# Patient Record
Sex: MALE | Race: WHITE | NOT HISPANIC OR LATINO | Employment: FULL TIME | URBAN - METROPOLITAN AREA
[De-identification: names, ages, dates, MRNs, and addresses within clinical notes are randomized per-mention and may not be internally consistent; named-entity substitution may affect disease eponyms.]

---

## 2017-01-03 ENCOUNTER — CLINICAL SUPPORT (OUTPATIENT)
Dept: SPEECH THERAPY | Facility: HOSPITAL | Age: 67
End: 2017-01-03
Attending: INTERNAL MEDICINE
Payer: COMMERCIAL

## 2017-01-03 DIAGNOSIS — G89.29 CHRONIC PAIN OF BOTH SHOULDERS: ICD-10-CM

## 2017-01-03 DIAGNOSIS — M25.511 CHRONIC PAIN OF BOTH SHOULDERS: ICD-10-CM

## 2017-01-03 DIAGNOSIS — R29.3 POOR POSTURE: ICD-10-CM

## 2017-01-03 DIAGNOSIS — M25.512 CHRONIC PAIN OF BOTH SHOULDERS: ICD-10-CM

## 2017-01-03 DIAGNOSIS — M54.2 PAINFUL CERVICAL ROM: ICD-10-CM

## 2017-01-03 PROCEDURE — 97110 THERAPEUTIC EXERCISES: CPT

## 2017-01-03 PROCEDURE — 97140 MANUAL THERAPY 1/> REGIONS: CPT

## 2017-01-03 NOTE — PROGRESS NOTES
"TIME RECORD    Date:  01/03/2017    Start Time:  800  Stop Time:  845    PROCEDURES:    TIMED  Procedure Min.   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  Diagnosis:   1. Painful cervical ROM     2. Chronic pain of both shoulders     3. Poor posture       Patient reports complaint of neck "stiffness, both sides, right slightly more than left."      Objective:     Manual therapy provided with patient in supine with B LE in 90/90 over traction stool x 20 minutes including STM to B cervical paraspinals with elongation focus right. B UT manual stretch with STM/MFR to same, B upper thoracic laminae release. Gentle suboccipital release.  Patient then instructed in and performed all therex as per log with 1:1 by PTA x 20 minutes total time.    Date 1/3/17   Visit 2   MHP NT   MT 20'   UT Str. MT/self   LV Str. 30"x3   Pec Str. 30"x3   Chin Tuck Supine 5"x5   DNF --   Cervical Lori 5"x5 sb/rot   Lats next   Rows next   Horizontal Abd --   Scaption --   Wall Slides 1x10   Initials DH 1/6         Assessment:     Significant muscle tension and myofascial restrictions noted with manual therapy B cervical paraspinals and UT, right greater than left.  Able to tolerate manual therapy to same and complete therex as noted with reports of "less stiff" after treatment.    Patient Education/Response:     Edu for postural awareness, use of towel roll for positioning in sitting to relieve cervical tension. Given written handout for expanded HEP self pec corner stretch and wall slides.  Demonstrated understanding.    Plans and Goals:     Continue PT per POC, progress as able.     Short Term Goals  = Long Term Goals (8 Weeks): 3/1/17  1. Pt will be independent with HEP  2. Pt will improve (B) cervical AROM by at least 10 degrees "   3. Pt will improve (B) shoulder abduction strength to 5/5 on MMT  4. Pt will improve (B) shoulder AROM flexion by at least 10 degrees  5. Pt will improve FOTO shoulder survey to </= 20% impaired  6. Pt will report <3/10 pain in cervical region when performing cervical AROM in all directions  7. Pt will report < 2/10 pain in (B) shoulders when performing functional activities with (B) UE

## 2017-01-10 ENCOUNTER — CLINICAL SUPPORT (OUTPATIENT)
Dept: SPEECH THERAPY | Facility: HOSPITAL | Age: 67
End: 2017-01-10
Attending: INTERNAL MEDICINE
Payer: COMMERCIAL

## 2017-01-10 DIAGNOSIS — G89.29 CHRONIC PAIN OF BOTH SHOULDERS: ICD-10-CM

## 2017-01-10 DIAGNOSIS — M54.2 PAINFUL CERVICAL ROM: ICD-10-CM

## 2017-01-10 DIAGNOSIS — M25.511 CHRONIC PAIN OF BOTH SHOULDERS: ICD-10-CM

## 2017-01-10 DIAGNOSIS — R29.3 POOR POSTURE: ICD-10-CM

## 2017-01-10 DIAGNOSIS — M25.512 CHRONIC PAIN OF BOTH SHOULDERS: ICD-10-CM

## 2017-01-10 PROCEDURE — 97110 THERAPEUTIC EXERCISES: CPT

## 2017-01-10 PROCEDURE — 97140 MANUAL THERAPY 1/> REGIONS: CPT

## 2017-01-10 NOTE — PROGRESS NOTES
"TIME RECORD    Date:  01/10/2017    Start Time:  800  Stop Time:  840    PROCEDURES:    TIMED  Procedure Min.   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  Diagnosis:   1. Painful cervical ROM     2. Chronic pain of both shoulders     3. Poor posture       Patient with reports of continued stiffness, states he has pain when performing self stretching especially B UT stretch.    Objective:     Manual therapy provided with patient in hooklying x 20 minutes including STM to B cervical paraspinals with elongation focus right. B UT and levator manual stretch with STM/MFR to same, B upper thoracic laminae release. Gentle suboccipital release.  Patient then performed all therex and self stretching as per log with 1:1 by PTA x 20 minutes total time.    Date 1/10/17 1/3/17   Visit 3  2   MHP NT NT   MT 20' 20'   UT Str. MT/self MT/self   LV Str. MT/self 30"x3   Pec Str. 30"x3 30"x3   Chin Tuck Supine 5"x10 Supine 5"x5   DNF next --   Cervical Lori 5"x5  sb/rot 5"x5 sb/rot   Lats GTB 2x10 next   Rows GTB  2x10 next   Horizontal Abd -- --   Scaption -- --   Wall Slides 2x10 1x10   Initials DH 2/6 DH 1/6       Assessment:     Patient able to tolerate manual therapy with reports of "less stiff" after treatment.  Increased muscle tension noted right compared to left.  Able to increase activity slightly with added lats/rows and demonstrate self stretching in pain free range after review.  Voiced no complaints at end of session.  "This is the best part of my week"    Patient Education/Response:     Reviewed self stretching, edu for importance of performing in pain free range, verbalized understanding and performed same after.    Plans and Goals:     Continue PT per POC, progress as able.     Short Term Goals  = Long Term " Goals (8 Weeks): 3/1/17  1. Pt will be independent with HEP  2. Pt will improve (B) cervical AROM by at least 10 degrees   3. Pt will improve (B) shoulder abduction strength to 5/5 on MMT  4. Pt will improve (B) shoulder AROM flexion by at least 10 degrees  5. Pt will improve FOTO shoulder survey to </= 20% impaired  6. Pt will report <3/10 pain in cervical region when performing cervical AROM in all directions  7. Pt will report < 2/10 pain in (B) shoulders when performing functional activities with (B) UE

## 2017-01-12 ENCOUNTER — TELEPHONE (OUTPATIENT)
Dept: PHARMACY | Facility: CLINIC | Age: 67
End: 2017-01-12

## 2017-01-13 ENCOUNTER — TELEPHONE (OUTPATIENT)
Dept: PHARMACY | Facility: CLINIC | Age: 67
End: 2017-01-13

## 2017-01-13 NOTE — TELEPHONE ENCOUNTER
Documentation only:    Copay card info for Dottie    Triston [id number]  PCN [id number]  GRP [id number]  ID [id number]    copay before 250$    After card 5$    MC-1/13/17

## 2017-01-16 ENCOUNTER — TELEPHONE (OUTPATIENT)
Dept: PHARMACY | Facility: CLINIC | Age: 67
End: 2017-01-16

## 2017-01-17 ENCOUNTER — CLINICAL SUPPORT (OUTPATIENT)
Dept: SPEECH THERAPY | Facility: HOSPITAL | Age: 67
End: 2017-01-17
Attending: INTERNAL MEDICINE
Payer: COMMERCIAL

## 2017-01-17 DIAGNOSIS — M25.512 CHRONIC PAIN OF BOTH SHOULDERS: ICD-10-CM

## 2017-01-17 DIAGNOSIS — M25.511 CHRONIC PAIN OF BOTH SHOULDERS: ICD-10-CM

## 2017-01-17 DIAGNOSIS — G89.29 CHRONIC PAIN OF BOTH SHOULDERS: ICD-10-CM

## 2017-01-17 DIAGNOSIS — R29.3 POOR POSTURE: ICD-10-CM

## 2017-01-17 DIAGNOSIS — M54.2 PAINFUL CERVICAL ROM: ICD-10-CM

## 2017-01-17 PROCEDURE — 97140 MANUAL THERAPY 1/> REGIONS: CPT

## 2017-01-17 PROCEDURE — 97110 THERAPEUTIC EXERCISES: CPT

## 2017-01-17 NOTE — PROGRESS NOTES
"TIME RECORD    Date:  01/17/2017    Start Time:  8:05  Stop Time:  9:00    PROCEDURES:    TIMED  Procedure Min.   MT 25   TE 30                 UNTIMED  Procedure Min.             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  Diagnosis:   1. Painful cervical ROM     2. Chronic pain of both shoulders     3. Poor posture       Pain: Pt reports stiffness in B UT and neck today rated 3/10. He also states he is starting to have Right     Objective:     Manual therapy provided with patient in hooklying x 25 minutes including STM to B cervical paraspinals with elongation focus right. B UT and levator manual stretch with STM/MFR to same, B upper thoracic paraspinals, Gentle suboccipital release, manual B shoulder depressions.  Patient then performed all therex and self stretching as per log with 1:1 by PTA x 30 minutes total time.    Date 1/17/17 1/10/17 1/3/17   Visit 4 3  2   MHP - NT NT   MT 25 20' 20'   UT Str. MT/self MT/self MT/self   LV Str. MT/ self MT/self 30"x3   Pec Str. 30"x3 30"x3 30"x3   Chin Tuck Supine 5"x10 Supine 5"x10 Supine 5"x5   DNF  next --   Cervical Lori 5"x5  sb/rot 5"x5  sb/rot 5"x5 sb/rot   Lats GTB 2x10 GTB 2x10 next   Rows GTB 2x10 GTB  2x10 next   Horizontal Abd  -- --   Scaption  -- --   Wall Slides 2x10 2x10 1x10   Initials JA 3/6 DH 2/6 DH 1/6         Assessment:     Pt responded well to PT session. He did not reports any increased pain in cervical spine, no adverse reactions to treatment noted.     Patient Education/Response:   Cont HEP, pt issued updated WHEP ( should retraction/pulldown, Pec st, isometric SB), given BTB    Plans and Goals:     Cont PT as per POC  Short Term Goals  = Long Term Goals (8 Weeks): 3/1/17  1. Pt will be independent with HEP  2. Pt will improve (B) cervical AROM by at least 10 degrees   3. Pt will " improve (B) shoulder abduction strength to 5/5 on MMT  4. Pt will improve (B) shoulder AROM flexion by at least 10 degrees  5. Pt will improve FOTO shoulder survey to </= 20% impaired  6. Pt will report <3/10 pain in cervical region when performing cervical AROM in all directions  7. Pt will report < 2/10 pain in (B) shoulders when performing functional activities with (B) UE

## 2017-01-24 ENCOUNTER — CLINICAL SUPPORT (OUTPATIENT)
Dept: SPEECH THERAPY | Facility: HOSPITAL | Age: 67
End: 2017-01-24
Attending: INTERNAL MEDICINE
Payer: COMMERCIAL

## 2017-01-24 DIAGNOSIS — M54.2 PAINFUL CERVICAL ROM: ICD-10-CM

## 2017-01-24 DIAGNOSIS — R29.3 POOR POSTURE: ICD-10-CM

## 2017-01-24 DIAGNOSIS — M25.512 CHRONIC PAIN OF BOTH SHOULDERS: ICD-10-CM

## 2017-01-24 DIAGNOSIS — G89.29 CHRONIC PAIN OF BOTH SHOULDERS: ICD-10-CM

## 2017-01-24 DIAGNOSIS — M25.511 CHRONIC PAIN OF BOTH SHOULDERS: ICD-10-CM

## 2017-01-24 PROCEDURE — 97110 THERAPEUTIC EXERCISES: CPT

## 2017-01-24 PROCEDURE — 97140 MANUAL THERAPY 1/> REGIONS: CPT

## 2017-01-24 NOTE — PROGRESS NOTES
"TIME RECORD    Date:  01/24/2017    Start Time:  8:06 am   Stop Time:  8:57 am     PROCEDURES:    TIMED  Procedure Min.   MT 24'   TE 27'                 UNTIMED  Procedure Min.             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  Diagnosis:   1. Painful cervical ROM     2. Chronic pain of both shoulders     3. Poor posture       Pain: 0 /10  Pt stated that he was not experiencing any pain, just stiffness in cervical region prior to therapy session today.     Objective:     Manual therapy provided with patient in hooklying x 24 minutes including STM to B cervical paraspinals with elongation focus right. STM/stretch to B UT, STM to B upper thoracic paraspinals, Gentle suboccipital release.  Patient then performed all therex and self stretching as per log with 1:1 by PTx 27 minutes.    Date 1/24/17 1/17/17 1/10/17 1/3/17   Visit 5 4 3  2   MHP - - NT NT   MT 24' 25 20' 20'   UT Str. MT/self 3x30" B MT/self MT/self MT/self   LV Str. 3x30" B MT/ self MT/self 30"x3   Pec Str.  30"x3 30"x3 30"x3   Chin Tuck Supine 5"x15 Supine 5"x10 Supine 5"x10 Supine 5"x5   DNF 3"x10  next --   Cervical Lori 5"x5 sb/rot 5"x5  sb/rot 5"x5  sb/rot 5"x5 sb/rot   Lats GTB 2x15 GTB 2x10 GTB 2x10 next   Rows GTB 2x15 GTB 2x10 GTB  2x10 next   Horizontal Abd RTB 2x10  -- --   Scaption -  -- --   Wall Slides 2x15 2x10 2x10 1x10   Initials CK JA 3/6 DH 2/6 DH 1/6       Assessment:     Pt was able to tolerate additional therex and increased reps noted per log above. Pt tolerated therapy session without any c/o increased pain.     Patient Education/Response:     Continue with HEP. Pt educated on and given handout on additional HEP consisting of DNF and horizontal abduction. Pt given RTB. Pt demo and verbalized understanding.     Plans and Goals:     Cont PT as per " POC  Short Term Goals  = Long Term Goals (8 Weeks): 3/1/17  1. Pt will be independent with HEP  2. Pt will improve (B) cervical AROM by at least 10 degrees   3. Pt will improve (B) shoulder abduction strength to 5/5 on MMT  4. Pt will improve (B) shoulder AROM flexion by at least 10 degrees  5. Pt will improve FOTO shoulder survey to </= 20% impaired  6. Pt will report <3/10 pain in cervical region when performing cervical AROM in all directions  7. Pt will report < 2/10 pain in (B) shoulders when performing functional activities with (B) UE

## 2017-01-31 ENCOUNTER — CLINICAL SUPPORT (OUTPATIENT)
Dept: SPEECH THERAPY | Facility: HOSPITAL | Age: 67
End: 2017-01-31
Attending: INTERNAL MEDICINE
Payer: COMMERCIAL

## 2017-01-31 DIAGNOSIS — M54.2 PAINFUL CERVICAL ROM: ICD-10-CM

## 2017-01-31 DIAGNOSIS — M25.512 CHRONIC PAIN OF BOTH SHOULDERS: ICD-10-CM

## 2017-01-31 DIAGNOSIS — M25.511 CHRONIC PAIN OF BOTH SHOULDERS: ICD-10-CM

## 2017-01-31 DIAGNOSIS — G89.29 CHRONIC PAIN OF BOTH SHOULDERS: ICD-10-CM

## 2017-01-31 DIAGNOSIS — R29.3 POOR POSTURE: ICD-10-CM

## 2017-01-31 PROCEDURE — 97110 THERAPEUTIC EXERCISES: CPT

## 2017-01-31 PROCEDURE — 97140 MANUAL THERAPY 1/> REGIONS: CPT

## 2017-01-31 NOTE — PROGRESS NOTES
"TIME RECORD    Date:  01/31/2017    Start Time:  800  Stop Time:  850    PROCEDURES:    TIMED  Procedure Min.   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  Diagnosis:   1. Painful cervical ROM     2. Chronic pain of both shoulders     3. Poor posture       Patient reports he feels 30-40% improved since start of care.      Objective:     Manual therapy provided with patient in hooklying x 20 minutes including STM to B cervical paraspinals with elongation focus right. STM/stretch to B UT, STM to B upper thoracic paraspinals, Gentle suboccipital release.  Patient then performed all therex and self stretching as per log with 1:1 by PTA x 30 minutes.  Added reps and trial standing scaption today.    Date 1/31/17 1/24/17 1/17/17 1/10/17 1/3/17   Visit 6 5 4 3  2   MHP -- - - NT NT   MT 20' 24' 25 20' 20'   UT Str. MT/self  30"x3 B MT/self 3x30" B MT/self MT/self MT/self   LV Str. MT/self  30"x3 B 3x30" B MT/ self MT/self 30"x3   Pec Str. 30"x3  30"x3 30"x3 30"x3   Chin Tuck Supine   5"x15 Supine 5"x15 Supine 5"x10 Supine 5"x10 Supine 5"x5   DNF 4"x10 3"x10  next --   Cervical Lori 5"x5  sb/rot 5"x5 sb/rot 5"x5  sb/rot 5"x5  sb/rot 5"x5 sb/rot   Lats BTB 2x10  GTB 2x15 GTB 2x10 GTB 2x10 next   Rows BTB 2x10 GTB 2x15 GTB 2x10 GTB  2x10 next   Horizontal Abd RTB 2x12 RTB 2x10  -- --   Scaption Stand 1x10 -  -- --   Wall Slides 2x15 2x15 2x10 2x10 1x10   Initials DH 1/6 CK JA 3/6 DH 2/6 DH 1/6         Assessment:     Continued myofascial restrictions noted right greater than left cervical paraspinals with manual therapy.  Able to tolerate same and increased activity with added reps and scaption.  Reports "good" after session.     Patient Education/Response:     Patient educated to continue HEP.  Verbalized " understanding.    Plans and Goals:     Cont PT as per POC.    Short Term Goals  = Long Term Goals (8 Weeks): 3/1/17  1. Pt will be independent with HEP  2. Pt will improve (B) cervical AROM by at least 10 degrees   3. Pt will improve (B) shoulder abduction strength to 5/5 on MMT  4. Pt will improve (B) shoulder AROM flexion by at least 10 degrees  5. Pt will improve FOTO shoulder survey to </= 20% impaired  6. Pt will report <3/10 pain in cervical region when performing cervical AROM in all directions  7. Pt will report < 2/10 pain in (B) shoulders when performing functional activities with (B) UE

## 2017-02-07 ENCOUNTER — TELEPHONE (OUTPATIENT)
Dept: PHARMACY | Facility: CLINIC | Age: 67
End: 2017-02-07

## 2017-02-07 ENCOUNTER — CLINICAL SUPPORT (OUTPATIENT)
Dept: SPEECH THERAPY | Facility: HOSPITAL | Age: 67
End: 2017-02-07
Attending: INTERNAL MEDICINE
Payer: COMMERCIAL

## 2017-02-07 DIAGNOSIS — M25.511 CHRONIC PAIN OF BOTH SHOULDERS: ICD-10-CM

## 2017-02-07 DIAGNOSIS — M25.512 CHRONIC PAIN OF BOTH SHOULDERS: ICD-10-CM

## 2017-02-07 DIAGNOSIS — R29.3 POOR POSTURE: ICD-10-CM

## 2017-02-07 DIAGNOSIS — M54.2 PAINFUL CERVICAL ROM: ICD-10-CM

## 2017-02-07 DIAGNOSIS — G89.29 CHRONIC PAIN OF BOTH SHOULDERS: ICD-10-CM

## 2017-02-07 PROCEDURE — 97110 THERAPEUTIC EXERCISES: CPT

## 2017-02-07 PROCEDURE — 97140 MANUAL THERAPY 1/> REGIONS: CPT

## 2017-02-07 RX ORDER — ADALIMUMAB 40MG/0.8ML
KIT SUBCUTANEOUS
Qty: 2 VIAL | Refills: 11 | Status: SHIPPED | OUTPATIENT
Start: 2017-02-07 | End: 2018-08-14

## 2017-02-07 NOTE — PROGRESS NOTES
"TIME RECORD    Date:  02/07/2017    Start Time:  805  Stop Time:  855    PROCEDURES:    TIMED  Procedure Min.   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  Diagnosis:   1. Painful cervical ROM     2. Chronic pain of both shoulders     3. Poor posture         Patient reports less "popping" noted in his neck since start of care.  Reports he has not had time for HEP every day.  "I do them sometimes".    Objective:     Manual therapy provided with patient in hooklying x 15 minutes including STM to B cervical paraspinals with elongation focus right. STM/stretch to B UT, STM to B upper thoracic paraspinals, Gentle suboccipital release.  Patient then performed all therex and self stretching as per log with 1:1 by PTA x 35 minutes.  Trial sitting cervical isometrics anf chin tucks as well as into to UBE today.    Date 2/7/17 1/31/17 1/24/17 1/17/17 1/10/17 1/3/17   Visit 7 6 5 4 3  2   MHP -- -- - - NT NT   MT 15' 20' 24' 25 20' 20'   UT Str. MT/self  30"x3 B MT/self  30"x3 B MT/self 3x30" B MT/self MT/self MT/self   LV Str. MT/self  30"x3 B MT/self  30"x3 B 3x30" B MT/ self MT/self 30"x3   Pec Str. 30"x3 30"x3  30"x3 30"x3 30"x3   Chin Tuck Sitting  5"x10 Supine   5"x15 Supine 5"x15 Supine 5"x10 Supine 5"x10 Supine 5"x5   DNF NT 4"x10 3"x10  next --   Cervical Lori 5"x5 sbrot 5"x5  sb/rot 5"x5 sb/rot 5"x5  sb/rot 5"x5  sb/rot 5"x5 sb/rot   Lats BTB 2x12 BTB 2x10  GTB 2x15 GTB 2x10 GTB 2x10 next   Rows BTB 2x12 BTB 2x10 GTB 2x15 GTB 2x10 GTB  2x10 next   Horizontal Abd RTB 2x15 RTB 2x12 RTB 2x10  -- --   Scaption Stand  2x10 Stand 1x10 -  -- --   Wall Slides 2x15 2x15 2x15 2x10 2x10 1x10   Initials DH 2/6 DH 1/6 CK JA 3/6 DH 2/6 DH 1/6         Assessment:     Continued myofascial restrictions noted cervical " "paraspinals with manual therapy.  Right greater than left.Patient able to tolerate progression of therex to sitting and additional of UBE with reports of "ok - good workout"  No complaints of pain or difficulty.    Patient Education/Response:     Edu for performance of HEP as able throughout the day - not waiting until he has time for entire program.      Plans and Goals:     Cont PT as per POC.    Short Term Goals  = Long Term Goals (8 Weeks): 3/1/17  1. Pt will be independent with HEP  2. Pt will improve (B) cervical AROM by at least 10 degrees   3. Pt will improve (B) shoulder abduction strength to 5/5 on MMT  4. Pt will improve (B) shoulder AROM flexion by at least 10 degrees  5. Pt will improve FOTO shoulder survey to </= 20% impaired  6. Pt will report <3/10 pain in cervical region when performing cervical AROM in all directions  7. Pt will report < 2/10 pain in (B) shoulders when performing functional activities with (B) UE        "

## 2017-02-21 ENCOUNTER — CLINICAL SUPPORT (OUTPATIENT)
Dept: REHABILITATION | Facility: HOSPITAL | Age: 67
End: 2017-02-21
Attending: INTERNAL MEDICINE
Payer: COMMERCIAL

## 2017-02-21 ENCOUNTER — TELEPHONE (OUTPATIENT)
Dept: FAMILY MEDICINE | Facility: CLINIC | Age: 67
End: 2017-02-21

## 2017-02-21 DIAGNOSIS — G89.29 CHRONIC PAIN OF BOTH SHOULDERS: ICD-10-CM

## 2017-02-21 DIAGNOSIS — M25.512 CHRONIC PAIN OF BOTH SHOULDERS: ICD-10-CM

## 2017-02-21 DIAGNOSIS — M54.2 PAINFUL CERVICAL ROM: ICD-10-CM

## 2017-02-21 DIAGNOSIS — M25.511 CHRONIC PAIN OF BOTH SHOULDERS: ICD-10-CM

## 2017-02-21 DIAGNOSIS — R29.3 POOR POSTURE: ICD-10-CM

## 2017-02-21 PROCEDURE — 97110 THERAPEUTIC EXERCISES: CPT | Mod: PN

## 2017-02-21 PROCEDURE — 97140 MANUAL THERAPY 1/> REGIONS: CPT | Mod: PN

## 2017-02-21 NOTE — TELEPHONE ENCOUNTER
Call returned to [place] with [facility].  Suzi was advised per Dr. Garcia that ist is ok to send out pt's metoprolol

## 2017-02-21 NOTE — TELEPHONE ENCOUNTER
----- Message from Gail Corley sent at 2/21/2017  2:57 PM CST -----  Contact: Ashley, [facility], [phone]  Called in regards to metoprolol succinate medication being ok to take with another medication. Please advise.

## 2017-02-21 NOTE — PROGRESS NOTES
"TIME RECORD    Date:  02/21/2017    Start Time:  8:00  Stop Time:  8:45    PROCEDURES:    TIMED  Procedure Min.   MT 20   TE 25                 UNTIMED  Procedure Min.             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  Diagnosis:   1. Painful cervical ROM     2. Chronic pain of both shoulders     3. Poor posture       Pain: pt reports he has a long week ahead of him with repairs for his RV. He reports he has to leave session early today. Pt agreeable to an abbreviated session.     Objective:   Manual therapy provided with patient in hooklying x 20 minutes including STM to B cervical paraspinals R>L. STM/stretch to B UT, STM to B upper thoracic paraspinals, Gentle suboccipital release.  Patient then performed all therex and self stretching as per log with 1:1 by PTA x 25 minutes.     Date 2/21/17 2/7/17 1/31/17 1/24/17 1/17/17 1/10/17 1/3/17   Visit 8 7 6 5 4 3  2   MHP - -- -- - - NT NT   MT 20 min  15' 20' 24' 25 20' 20'   UT Str. MT/self  30"x3 B MT/self  30"x3 B MT/self  30"x3 B MT/self 3x30" B MT/self MT/self MT/self   LV Str. MT/self  30"x3 B MT/self  30"x3 B MT/self  30"x3 B 3x30" B MT/ self MT/self 30"x3   Pec Str. 30"x3 30"x3 30"x3  30"x3 30"x3 30"x3   Chin Tuck Sitting  5"x10 Sitting  5"x10 Supine   5"x15 Supine 5"x15 Supine 5"x10 Supine 5"x10 Supine 5"x5   DNF  NT 4"x10 3"x10  next --   Cervical Lori 5"x5 sbrot 5"x5 sbrot 5"x5  sb/rot 5"x5 sb/rot 5"x5  sb/rot 5"x5  sb/rot 5"x5 sb/rot   Lats BTB 2x12 BTB 2x12 BTB 2x10  GTB 2x15 GTB 2x10 GTB 2x10 next   Rows BTB 2x12 BTB 2x12 BTB 2x10 GTB 2x15 GTB 2x10 GTB  2x10 next   Horizontal Abd RTB 2x15 RTB 2x15 RTB 2x12 RTB 2x10  -- --   Scaption Stand  2x10 Stand  2x10 Stand 1x10 -  -- --   Wall Slides 2x15 2x15 2x15 2x15 2x10 2x10 1x10   Initials JA 3/6 DH 2/6 DH 1/6 TOMMY RUANO 3/6 DH 2/6 DH 1/6 "       Assessment:   Pt completed today session with no reports of increased pain in cervical spine today. He was able to perform all therx with no adverse reactions.     Patient Education/Response:     Cont HEP    Plans and Goals:     Short Term Goals  = Long Term Goals (8 Weeks): 3/1/17  1. Pt will be independent with HEP  2. Pt will improve (B) cervical AROM by at least 10 degrees   3. Pt will improve (B) shoulder abduction strength to 5/5 on MMT  4. Pt will improve (B) shoulder AROM flexion by at least 10 degrees  5. Pt will improve FOTO shoulder survey to </= 20% impaired  6. Pt will report <3/10 pain in cervical region when performing cervical AROM in all directions  7. Pt will report < 2/10 pain in (B) shoulders when performing functional activities with (B) UE

## 2017-03-07 ENCOUNTER — TELEPHONE (OUTPATIENT)
Dept: PHARMACY | Facility: CLINIC | Age: 67
End: 2017-03-07

## 2017-03-20 ENCOUNTER — LAB VISIT (OUTPATIENT)
Dept: LAB | Facility: HOSPITAL | Age: 67
End: 2017-03-20
Attending: INTERNAL MEDICINE
Payer: COMMERCIAL

## 2017-03-20 DIAGNOSIS — M06.00 SERONEGATIVE RHEUMATOID ARTHRITIS: ICD-10-CM

## 2017-03-20 LAB
ALBUMIN SERPL BCP-MCNC: 4 G/DL
ALP SERPL-CCNC: 75 U/L
ALT SERPL W/O P-5'-P-CCNC: 31 U/L
ANION GAP SERPL CALC-SCNC: 8 MMOL/L
AST SERPL-CCNC: 39 U/L
BASOPHILS # BLD AUTO: 0.02 K/UL
BASOPHILS NFR BLD: 0.2 %
BILIRUB SERPL-MCNC: 0.8 MG/DL
BUN SERPL-MCNC: 15 MG/DL
CALCIUM SERPL-MCNC: 9.7 MG/DL
CHLORIDE SERPL-SCNC: 107 MMOL/L
CO2 SERPL-SCNC: 27 MMOL/L
CREAT SERPL-MCNC: 1.1 MG/DL
CRP SERPL-MCNC: 3.4 MG/L
DIFFERENTIAL METHOD: ABNORMAL
EOSINOPHIL # BLD AUTO: 0.2 K/UL
EOSINOPHIL NFR BLD: 1.7 %
ERYTHROCYTE [DISTWIDTH] IN BLOOD BY AUTOMATED COUNT: 14.7 %
ERYTHROCYTE [SEDIMENTATION RATE] IN BLOOD BY WESTERGREN METHOD: 12 MM/HR
EST. GFR  (AFRICAN AMERICAN): >60 ML/MIN/1.73 M^2
EST. GFR  (NON AFRICAN AMERICAN): >60 ML/MIN/1.73 M^2
GLUCOSE SERPL-MCNC: 108 MG/DL
HCT VFR BLD AUTO: 39.3 %
HGB BLD-MCNC: 13.5 G/DL
LYMPHOCYTES # BLD AUTO: 3 K/UL
LYMPHOCYTES NFR BLD: 28.3 %
MCH RBC QN AUTO: 31.5 PG
MCHC RBC AUTO-ENTMCNC: 34.4 %
MCV RBC AUTO: 92 FL
MONOCYTES # BLD AUTO: 0.5 K/UL
MONOCYTES NFR BLD: 4.4 %
NEUTROPHILS # BLD AUTO: 7 K/UL
NEUTROPHILS NFR BLD: 65.2 %
PLATELET # BLD AUTO: 207 K/UL
PMV BLD AUTO: 10.2 FL
POTASSIUM SERPL-SCNC: 4.1 MMOL/L
PROT SERPL-MCNC: 7.4 G/DL
RBC # BLD AUTO: 4.29 M/UL
SODIUM SERPL-SCNC: 142 MMOL/L
WBC # BLD AUTO: 10.73 K/UL

## 2017-03-20 PROCEDURE — 36415 COLL VENOUS BLD VENIPUNCTURE: CPT

## 2017-03-20 PROCEDURE — 80053 COMPREHEN METABOLIC PANEL: CPT

## 2017-03-20 PROCEDURE — 86140 C-REACTIVE PROTEIN: CPT

## 2017-03-20 PROCEDURE — 85025 COMPLETE CBC W/AUTO DIFF WBC: CPT

## 2017-03-20 PROCEDURE — 85652 RBC SED RATE AUTOMATED: CPT

## 2017-03-21 ENCOUNTER — TELEPHONE (OUTPATIENT)
Dept: RHEUMATOLOGY | Facility: CLINIC | Age: 67
End: 2017-03-21

## 2017-03-22 ENCOUNTER — OFFICE VISIT (OUTPATIENT)
Dept: RHEUMATOLOGY | Facility: CLINIC | Age: 67
End: 2017-03-22
Payer: COMMERCIAL

## 2017-03-22 VITALS — RESPIRATION RATE: 20 BRPM | HEART RATE: 50 BPM | SYSTOLIC BLOOD PRESSURE: 128 MMHG | DIASTOLIC BLOOD PRESSURE: 76 MMHG

## 2017-03-22 DIAGNOSIS — G56.03 BILATERAL CARPAL TUNNEL SYNDROME: Primary | ICD-10-CM

## 2017-03-22 DIAGNOSIS — M25.551 RIGHT HIP PAIN: ICD-10-CM

## 2017-03-22 DIAGNOSIS — M79.89 BILATERAL HAND SWELLING: ICD-10-CM

## 2017-03-22 PROCEDURE — 99214 OFFICE O/P EST MOD 30 MIN: CPT | Mod: S$GLB,,, | Performed by: INTERNAL MEDICINE

## 2017-03-22 PROCEDURE — 99999 PR PBB SHADOW E&M-EST. PATIENT-LVL III: CPT | Mod: PBBFAC,,, | Performed by: INTERNAL MEDICINE

## 2017-03-22 PROCEDURE — 1160F RVW MEDS BY RX/DR IN RCRD: CPT | Mod: S$GLB,,, | Performed by: INTERNAL MEDICINE

## 2017-03-22 RX ORDER — PREDNISONE 2.5 MG/1
TABLET ORAL
Qty: 90 TABLET | Refills: 0 | Status: SHIPPED | OUTPATIENT
Start: 2017-03-22 | End: 2017-07-06 | Stop reason: SDUPTHER

## 2017-03-22 RX ORDER — OXYCODONE AND ACETAMINOPHEN 10; 325 MG/1; MG/1
1 TABLET ORAL EVERY 4 HOURS PRN
Status: ON HOLD | COMMUNITY
End: 2018-11-29 | Stop reason: HOSPADM

## 2017-03-22 NOTE — MR AVS SNAPSHOT
[facility]  [address] [place] [address] [address]  [place] [address]  Phone: [phone]                  Franko Buchanan MD   3/22/2017 11:30 AM   Office Visit    Description:  Male : 1950   Provider:  Ines Strong MD   Department:  [facility]           Reason for Visit     Disease Management           Diagnoses this Visit        Comments    Bilateral carpal tunnel syndrome    -  Primary     Right hip pain         Bilateral hand swelling                To Do List           Future Appointments        Provider Department Dept Phone    3/23/2017 7:45 AM [facility] XR1 [facility] [phone]    4/3/2017 7:45 AM [facility] MRI1 [facility] [phone]    2017 10:30 AM Esthela Medina MD [facility] [phone]    2017 2:50 PM Nayana Moyer MD [facility] [phone]    2017 1:00 PM EKG, APPT [facility] - [phone]      Goals (5 Years of Data)     None      [facility]     [facility] -  Assistance  Registered nurses in the [facility] provide clinical advisement, health education, appointment booking, and other advisory services.  Call for this free service at [phone].             Medications           Message regarding Medications     Verify the changes and/or additions to your medication regime listed below are the same as discussed with your clinician today.  If any of these changes or additions are incorrect, please notify your healthcare provider.             Verify that the below list of medications is an accurate representation of the medications you are currently taking.  If none reported, the list may be blank. If incorrect, please contact your healthcare provider. Carry this list with you in case of emergency.           Current Medications     aspirin (ECOTRIN) 325 MG EC tablet Take 325 mg by mouth once daily.    calcium carbonate 220 mg capsule Take  250 mg by mouth 2 (two) times daily with meals.    cholecalciferol, vitamin D3, 2,000 unit Cap Take 1 capsule by mouth once daily.    duloxetine (CYMBALTA) 30 MG capsule Take 1 capsule (30 mg total) by mouth every evening.    efinaconazole (JUBLIA) 10 % Ivory aaa on nail qhs    folic acid (FOLVITE) 1 MG tablet Take 1 tablet (1 mg total) by mouth once daily.    HUMIRA 40 mg/0.8 mL injection INJECT 0.8 MLS (40 MG TOTAL) INTO THE SKIN EVERY 14 DAYS    luliconazole (LUZU) 1 % Crea Apply 1 application topically once daily.    methotrexate 2.5 MG Tab TAKE 8 TABLETS (20MG) BY MOUTH EVERY 7 DAYS    metoprolol succinate (TOPROL-XL) 50 MG 24 hr tablet Take 1 tablet (50 mg total) by mouth once daily.    MULTIVITAMIN W-MINERALS/LUTEIN (CENTRUM SILVER ORAL) Take by mouth.    omega-3 fatty acids-vitamin E (SUPER EPA) 1,000-5 mg-unit Cap Take 1 capsule by mouth once daily.     oxycodone-acetaminophen (PERCOCET)  mg per tablet Take 1 tablet by mouth every 4 (four) hours as needed for Pain.    pantoprazole (PROTONIX) 40 MG tablet Take 1 tablet (40 mg total) by mouth once daily.    predniSONE (DELTASONE) 10 MG tablet Take 20 mg by mouth once daily.     celecoxib (CELEBREX) 200 MG capsule Take 200 mg by mouth 2 (two) times daily as needed.     flecainide (TAMBOCOR) 150 MG Tab Take 2 tablets (300 mg total) by mouth daily as needed.    ibuprofen (ADVIL,MOTRIN) 200 MG tablet Take 400 mg by mouth every 6 (six) hours as needed for Pain.           Clinical Reference Information           Your Vitals Were     BP Pulse Resp             128/76 50 20         Blood Pressure          Most Recent Value    BP  128/76      Allergies as of 3/22/2017     No Known Allergies      Immunizations Administered on Date of Encounter - 3/22/2017     None      Orders Placed During Today's Visit      Normal Orders This Visit    Ambulatory consult to Orthopedics     Future Labs/Procedures Expected by Expires    MRI Hand/Wrist W WO Right_Rheumatoid Arthritis   3/22/2017 3/22/2018    X-Ray Hips Bilateral 2 View Inc AP Pelvis  3/22/2017 3/22/2018      Language Assistance Services     ATTENTION: Language assistance services are available, free of charge. Please call [phone].      ATENCIÓN: Si habla luisito, tiene a middleton disposición servicios gratuitos de asistencia lingüística. Llame al [phone].     CHÚ Ý: N?u b?n nói Ti?ng Vi?t, có các d?ch v? h? tr? ngôn ng? mi?n phí dành cho b?n. G?i s? [phone].         [facility] complies with applicable Federal civil rights laws and does not discriminate on the basis of race, color, national origin, age, disability, or sex.

## 2017-03-22 NOTE — PROGRESS NOTES
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    Chief Complaint: Joint Pain     HPI 66 yo male with PMH of ALLAN, atrial fibrillation on 325 mg qday, l3-l4 laminectomy around 2000, 2009( L5-51 dissecotmy), cervical epidural, right inguinal hernia, and kidney stones.  here for evaluation of shoulder and hip stiffness.  He has trouble with rising and sitting from chair.  He also has trouble putting on his jacket. He feels fatigued.  Symptoms have been on going for 2 months.He also reports trouble with weakness in  in both hands and shooting pain  from his neck down to his arms. The pain wakes him up at night. Reports 20 pounds weight loss.   Feels sometimes that legs are weak.  He has appt with neurosurgeon tomorrow.  No headaches, but maybe mild pressure in temples ( he reports reading about [facility]).  Denies any swelling.  Reports that he had some stiffness in morning that has improved in morning since starting prednisone.    Interval history: He is here for follow up of  Seronegative RA.   Reports he is taking prednisone  4.5 mg a day and 5 mg three times a week.  He has soreness in arms.   He has stiffness in hands and in a while shooting pain.  He is on MTX 8 pills a week and doing Humira.  He tried massages with helping of neck pain.   He is doing well on current dose. He has right hip pain.   He is taking cymbalta at bedtime.  He uses his cpap machine all the time.        Past Medical History   Diagnosis Date    Sleep apnea     Atrial fibrillation     Colon polyps     Retinal hole      Right eye    Cataract        Review of Systems   Constitutional: Positive for fatigue. Negative for fever, chills and appetite change.   HENT: Negative for hearing loss, mouth sores, rhinorrhea, sinus pressure and trouble swallowing.    Eyes: Negative for photophobia, pain, discharge, itching and visual disturbance.   Respiratory: Negative for cough, chest tightness, wheezing and stridor.     Cardiovascular: Negative for chest pain and palpitations.   Gastrointestinal: Negative for blood in stool and abdominal distention.   Endocrine: Negative for cold intolerance and heat intolerance.   Genitourinary: Negative for dysuria, hematuria and flank pain.   Musculoskeletal: Positive for myalgias, arthralgias and neck pain. Negative for back pain, joint swelling, gait problem and neck stiffness.   Skin: Negative for color change, pallor and rash.   Neurological: Negative for dizziness, light-headedness, numbness and headaches.   Hematological: Negative for adenopathy. Does not bruise/bleed easily.   Psychiatric/Behavioral: Negative for decreased concentration and agitation. The patient is not nervous/anxious.            Objective:     Physical Exam   Constitutional: He is oriented to person, place, and time. No distress.   HENT:   Head: Normocephalic and atraumatic.   Eyes: Conjunctivae are normal. Pupils are equal, round, and reactive to light.   Neck: No tracheal deviation present. No thyromegaly present.   Cardiovascular: Normal rate, regular rhythm and normal heart sounds.  Exam reveals no gallop and no friction rub.    No murmur heard.  Pulmonary/Chest: No stridor. No respiratory distress. He has no wheezes. He has no rales. He exhibits no tenderness.   Abdominal: Soft. He exhibits no distension. There is no tenderness. There is no rebound.   Neurological: He is alert and oriented to person, place, and time.   Decreased  strength in both hands   Skin: Skin is warm. He is not diaphoretic. ; bulls eye appearing lesion in left groin; erythematous lacy lesion on left foot and small punctate lesions  In left sole  Musculoskeletal:    Shoulders-abduction to 160 degrees bilaterally (worse than last visit)  Trouble rising from chair (resolved)  Motor- 5/5 strength in upper and lower extremity proximally but has decreased  strength (improved since last visit)  Hands- able to make almost full fist now  bilaterally  Wrists- no synovitis         8/2015  Ina-+   subserologies-negative  Esr- 40<51<24  crp- 5<35  Rf,ccp-negative  cpk- 115<92  < 676 (2013)  Aldolase-wnl  spep-wnl  Irma-negative       Thoracic xray (2015): There are anterior flowing osteophytes in the midthoracic spine.    Xrays: (shoulder) -9/2015:  Small high density foci along the margin of the humeral head bilaterally suggestive for calcific tendinitis more prominent on the right. There is   degenerative change of the AC joints also more prominent on the right with hypertrophic spurring.    Mri (cervical):  (2015)Degenerative changes of the cervical spine, most prominent at C5-6 causing mild central spinal canal stenosis.      Mri lumbar:(2015)   Multilevel, moderate to severe lumbar spondylosis is present, as detailed above:  -- severe neural foraminal narrowing on the left at L1-2, on the right at L3-4, bilaterally at L4-5 and on the right at L5-S1.  -- multilevel, moderately severe acquired spinal canal stenosis most pronounced at L4-5.  -- multilevel moderate to severe bilateral facet arthrosis.  -- postoperative changes of left L5 laminectomy and suspected laminotomy at L3.     CT abdomen (2015):visualized portion of the aorta is significant for mild calcification and plaque formation. The right and left kidneys each contain two punctate stones which measure approximately 0.2 cm. The right kidney contains an exophytic 1.4-cm hypodensity extending off the inferior pole which is incompletely characterized but likely represents a cyst. The left kidney contains a 1 cm hypodensity within the superior pole which is incompletely characterized but likely represents a cyst. The     Chest xray (2015): WNL   emg/ncs: (10/20/2015): predominantly sensory axonal polyneuropathy; mild bilateral carpal tunnel syndrome; chronic right c7-c8 radiculopathy may also be present    Bone scan (10/2015):.Degenerative changes in the glenohumeral and SI  joints.    Assessment:      68 yo male with PMH of ALLAN, atrial fibrillation, CTS, cervical radiculopathy, polyneuropathy, renal cysts   here for  For follow up of seronegative RA. Patient initially presented with shoulder and hip stiffness, decreased  strength of both hands,  Hand swelling/stiffness ,anorexia with weight loss.  He is on  MTX 8 pills a week and started on  Humira on April 3, 2016 and tolerating it well. He reports continued pain in hands and shoulder pain.  I told him I do not think it is from his RA given inflammatory markers are normal and on exam I do not detect synovitis.  He reports episodes of right hip pain which I suspect are from bursitis so have asked him to follow up with pain specialist.  Will continue to wean down the prednisone.      1) Seronegative RA:  MRI of hand  - wean prednisone from 4.5 mg a day to 4mg a day  -continue MTX 8 pills a week with folic acid  -continue Humira 40mg sub q every 14 day (started April 3, 2016)  --referral to Dr. Cedillo for CTS  -labs before next appt  Hip xrays      2.fibromyalgia a-controlled.  -continue cymbalta 30mg a day.    rtc in 12   weeks

## 2017-04-03 ENCOUNTER — PATIENT MESSAGE (OUTPATIENT)
Dept: RHEUMATOLOGY | Facility: CLINIC | Age: 67
End: 2017-04-03

## 2017-04-03 ENCOUNTER — HOSPITAL ENCOUNTER (OUTPATIENT)
Dept: RADIOLOGY | Facility: HOSPITAL | Age: 67
Discharge: HOME | End: 2017-04-03
Attending: INTERNAL MEDICINE
Payer: COMMERCIAL

## 2017-04-03 DIAGNOSIS — M79.89 BILATERAL HAND SWELLING: ICD-10-CM

## 2017-04-03 DIAGNOSIS — M25.551 RIGHT HIP PAIN: ICD-10-CM

## 2017-04-03 PROCEDURE — 25500020 PHARM REV CODE 255: Performed by: INTERNAL MEDICINE

## 2017-04-03 PROCEDURE — 73521 X-RAY EXAM HIPS BI 2 VIEWS: CPT | Mod: TC

## 2017-04-03 PROCEDURE — 73223 MRI JOINT UPR EXTR W/O&W/DYE: CPT | Mod: 26,RT,, | Performed by: RADIOLOGY

## 2017-04-03 PROCEDURE — 73521 X-RAY EXAM HIPS BI 2 VIEWS: CPT | Mod: 26,,, | Performed by: RADIOLOGY

## 2017-04-03 PROCEDURE — 73223 MRI JOINT UPR EXTR W/O&W/DYE: CPT | Mod: TC,RT

## 2017-04-03 PROCEDURE — A9585 GADOBUTROL INJECTION: HCPCS | Performed by: INTERNAL MEDICINE

## 2017-04-03 RX ORDER — GADOBUTROL 604.72 MG/ML
10 INJECTION INTRAVENOUS
Status: COMPLETED | OUTPATIENT
Start: 2017-04-03 | End: 2017-04-03

## 2017-04-03 RX ADMIN — GADOBUTROL 10 ML: 604.72 INJECTION INTRAVENOUS at 08:04

## 2017-04-04 ENCOUNTER — PATIENT MESSAGE (OUTPATIENT)
Dept: RHEUMATOLOGY | Facility: CLINIC | Age: 67
End: 2017-04-04

## 2017-04-05 DIAGNOSIS — M79.641 BILATERAL HAND PAIN: Primary | ICD-10-CM

## 2017-04-05 DIAGNOSIS — M79.642 BILATERAL HAND PAIN: Primary | ICD-10-CM

## 2017-04-07 ENCOUNTER — TELEPHONE (OUTPATIENT)
Dept: PHARMACY | Facility: CLINIC | Age: 67
End: 2017-04-07

## 2017-05-02 ENCOUNTER — TELEPHONE (OUTPATIENT)
Dept: PHARMACY | Facility: CLINIC | Age: 67
End: 2017-05-02

## 2017-05-02 ENCOUNTER — OFFICE VISIT (OUTPATIENT)
Dept: SPORTS MEDICINE | Facility: CLINIC | Age: 67
End: 2017-05-02
Payer: COMMERCIAL

## 2017-05-02 VITALS
WEIGHT: 235 LBS | HEART RATE: 50 BPM | HEIGHT: 73 IN | SYSTOLIC BLOOD PRESSURE: 119 MMHG | BODY MASS INDEX: 31.14 KG/M2 | DIASTOLIC BLOOD PRESSURE: 75 MMHG

## 2017-05-02 DIAGNOSIS — M25.851 IMPINGEMENT SYNDROME, HIP, RIGHT: ICD-10-CM

## 2017-05-02 DIAGNOSIS — M70.61 GREATER TROCHANTERIC BURSITIS OF RIGHT HIP: ICD-10-CM

## 2017-05-02 DIAGNOSIS — M25.551 RIGHT HIP PAIN: Primary | ICD-10-CM

## 2017-05-02 PROCEDURE — 99203 OFFICE O/P NEW LOW 30 MIN: CPT | Mod: 25,S$GLB,, | Performed by: ORTHOPAEDIC SURGERY

## 2017-05-02 PROCEDURE — 99999 PR PBB SHADOW E&M-EST. PATIENT-LVL III: CPT | Mod: PBBFAC,,, | Performed by: ORTHOPAEDIC SURGERY

## 2017-05-02 PROCEDURE — 20610 DRAIN/INJ JOINT/BURSA W/O US: CPT | Mod: RT,S$GLB,, | Performed by: ORTHOPAEDIC SURGERY

## 2017-05-02 RX ORDER — TRIAMCINOLONE ACETONIDE 40 MG/ML
40 INJECTION, SUSPENSION INTRA-ARTICULAR; INTRAMUSCULAR
Status: DISCONTINUED | OUTPATIENT
Start: 2017-05-02 | End: 2017-05-02 | Stop reason: HOSPADM

## 2017-05-02 RX ADMIN — TRIAMCINOLONE ACETONIDE 40 MG: 40 INJECTION, SUSPENSION INTRA-ARTICULAR; INTRAMUSCULAR at 12:05

## 2017-05-02 NOTE — PROGRESS NOTES
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 Today the patient rates pain at a 3/10 on visual analog scale.       He report more than 2 months of pain without PARTH.  It wakes him up at night.  Pain with prolonged walking.    Affecting ADLs and exercising    REVIEW OF SYSTEMS:  Constitution: Negative. Negative for chills, fever and night sweats.   HENT: Negative for congestion and headaches.    Eyes: Negative for blurred vision, left vision loss and right vision loss.   Cardiovascular: Negative for chest pain and syncope.   Respiratory: Negative for cough and shortness of breath.    Endocrine: Negative for polydipsia, polyphagia and polyuria.   Hematologic/Lymphatic: Negative for bleeding problem. Does not bruise/bleed easily.   Skin: Negative for dry skin, itching and rash.   Musculoskeletal: Negative for falls. right hip pain and  muscle weakness.   Gastrointestinal: Negative for abdominal pain and bowel incontinence.   Genitourinary: Negative for bladder incontinence and nocturia.   Neurological: Negative for disturbances in coordination, loss of balance and seizures.   Psychiatric/Behavioral: Negative for depression. The patient does not have insomnia.    Allergic/Immunologic: Negative for hives and persistent infections.     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    Sleep apnea        PAST SURGICAL HISTORY:  Past Surgical History:   Procedure Laterality Date    BACK SURGERY      1990's and 2009; last by Naldo    Focal Laser       Right eye    HERNIA REPAIR Right     inguinal    SKIN CANCER EXCISION      SPINE SURGERY      l3-4/ l5-s1  (x 2)    TONSILLECTOMY          FAMILY HISTORY:   Family History   Problem Relation Age of Onset    Colon polyps Father     Cancer Father      leukemia    Glaucoma Mother     Thyroid disease Sister      hashimoto    Cancer Sister      renal    No Known Problems Sister     Heart failure Maternal Grandfather     Cataracts Maternal Grandmother     Alzheimer's disease Maternal Grandmother     No Known Problems Maternal Aunt     No Known Problems Maternal Uncle     No Known Problems Paternal Aunt     No Known Problems Paternal Uncle     No Known Problems Paternal Grandmother     Cancer Paternal Grandfather      colon    Diabetes Neg Hx     Heart disease Neg Hx     Amblyopia Neg Hx     Blindness Neg Hx     Macular degeneration Neg Hx     Retinal detachment Neg Hx     Strabismus Neg Hx     Hypertension Neg Hx     Stroke Neg Hx        SOCIAL HISTORY:   Social History     Social History    Marital status:      Spouse name: Joyce    Number of children: N/A    Years of education: N/A     Occupational History    [employer]     Social History Main Topics    Smoking status: Never Smoker    Smokeless tobacco: Never Used    Alcohol use 3.0 oz/week     6 Standard drinks or equivalent per week      Comment: 3 X WEEK     Drug use: No    Sexual activity: Not on file     Other Topics Concern    Not on file     Social History Narrative       MEDICATIONS:    Current Outpatient Prescriptions:     aspirin (ECOTRIN) 325 MG EC tablet, Take 325 mg by mouth once daily., Disp: , Rfl:     calcium carbonate 220 mg capsule, Take 250 mg by mouth 2 (two) times daily with meals., Disp: , Rfl:     celecoxib (CELEBREX) 200 MG capsule, Take 200 mg by mouth 2 (two) times daily as needed. , Disp: , Rfl:     cholecalciferol, vitamin D3, 2,000 unit Cap, Take 1 capsule by mouth once daily., Disp: , Rfl:     duloxetine (CYMBALTA) 30 MG capsule, Take 1 capsule (30 mg total) by mouth every evening., Disp: 90  "capsule, Rfl: 3    efinaconazole (JUBLIA) 10 % Ivory, aaa on nail qhs, Disp: 8 mL, Rfl: 12    folic acid (FOLVITE) 1 MG tablet, Take 1 tablet (1 mg total) by mouth once daily., Disp: 90 tablet, Rfl: 11    HUMIRA 40 mg/0.8 mL injection, INJECT 0.8 MLS (40 MG TOTAL) INTO THE SKIN EVERY 14 DAYS, Disp: 2 vial, Rfl: 11    ibuprofen (ADVIL,MOTRIN) 200 MG tablet, Take 400 mg by mouth every 6 (six) hours as needed for Pain., Disp: , Rfl:     luliconazole (LUZU) 1 % Crea, Apply 1 application topically once daily., Disp: 60 g, Rfl: 3    methotrexate 2.5 MG Tab, TAKE 8 TABLETS (20MG) BY MOUTH EVERY 7 DAYS, Disp: 96 tablet, Rfl: 1    metoprolol succinate (TOPROL-XL) 50 MG 24 hr tablet, Take 1 tablet (50 mg total) by mouth once daily., Disp: 90 tablet, Rfl: 3    MULTIVITAMIN W-MINERALS/LUTEIN (CENTRUM SILVER ORAL), Take by mouth., Disp: , Rfl:     omega-3 fatty acids-vitamin E (SUPER EPA) 1,000-5 mg-unit Cap, Take 1 capsule by mouth once daily. , Disp: , Rfl:     oxycodone-acetaminophen (PERCOCET)  mg per tablet, Take 1 tablet by mouth every 4 (four) hours as needed for Pain., Disp: , Rfl:     pantoprazole (PROTONIX) 40 MG tablet, Take 1 tablet (40 mg total) by mouth once daily., Disp: 90 tablet, Rfl: 11    predniSONE (DELTASONE) 10 MG tablet, Take 20 mg by mouth once daily. , Disp: , Rfl:     predniSONE (DELTASONE) 2.5 MG tablet, TAKE ONE TABLET BY MOUTH EVERY DAY, Disp: 90 tablet, Rfl: 0    flecainide (TAMBOCOR) 150 MG Tab, Take 2 tablets (300 mg total) by mouth daily as needed., Disp: 30 tablet, Rfl: 6    ALLERGIES:   Review of patient's allergies indicates:  No Known Allergies    VITAL SIGNS:  /75  Pulse (!) 50  Ht 6' 1" (1.854 m)  Wt 106.6 kg (235 lb)  BMI 31 kg/m2     PHYSICAL EXAM / HIP    PHYSICAL EXAMINATION:  General:  The patient is alert and oriented x 3.  Mood is pleasant.  Observation of ears, eyes and nose reveal no gross abnormalities.  HEENT: NCAT, sclera nonicteric  Lungs: " Respirations are equal and unlabored.    RIGHT HIP EXAMINATION     OBSERVATION / INSPECTION  Gait:   Antalgic   Alignment:  Neutral   Scars:   None   Muscle atrophy: None   Effusion:  None   Warmth:  None   Discoloration:   None   Leg lengths:   Equal   Pelvis:    Level     TENDERNESS / CREPITUS (T/C):      T / C  Trochanteric bursa   + / -  Piriformis    - / -  SI joint    - / -  Psoas tendon   - / -  Rectus insertion  - / -  Adductor insertion  - / -  Pubic symphysis  - / -    ROM: (* = pain)    Flexion:    120 degrees  External rotation: 40 degrees  Internal rotation: 30 degrees  Abduction:  45 degrees  Adduction:   20 degrees    SPECIAL TESTS:  Pain w/ forced internal rotation (FADIR): +   Pain w/ forced external rotation (CHAKA): Absent   CHAKA asymmetry:     -  Circumduction test:    -  Stinchfield test:    Negative   Log roll:      Negative   Snapping hip (internal):   Negative   Sit-up pain:     Negative   Resisted sit-up pain:    Negative   Resisted sit-up w/ adductor contraction pain:Negative   Step-down test:    +  Trendelenburg test:    Negative   Bridge test     -    EXTREMITY NEURO-VASCULAR EXAMINATION:   Sensation:  Grossly intact to light touch all dermatomal regions.   Motor Function:  Fully intact motor function at hip, knee, foot and ankle    DTRs;  quadriceps and  achilles 2+.  No clonus and downgoing Babinski.    Vascular status:  DP and PT pulses 2+, brisk capillary refill, symmetric.    Skin: intact, compartments soft.    IMAGING Bilateral (4/3/17): Osseous structures appear intact without evidence of a displaced fracture.  No evidence of dislocation. Moderate degenerative changes in the lower lumbar spine. Degenerative changes about both hips with osteophytosis and subtle loss of joint space, right more than left.   No focal soft tissue swelling or radiopaque foreign body.     ASSESSMENT:   Right hip pain  Right hip impingement syndrome  Right hip trochanteric bursitis     PLAN:  1.  Cortisone injection to right trochanteric bursa  2. Physical therapy at [facility]  3. Follow up 2 months

## 2017-05-02 NOTE — PROCEDURES
Large Joint Aspiration/Injection  Date/Time: 5/2/2017 12:38 PM  Performed by: ROGER VITALE  Authorized by: ROGER VITALE     Consent Done?:  Yes (Verbal)  Indications:  Pain  Procedure site marked: Yes    Timeout: Prior to procedure the correct patient, procedure, and site was verified      Location:  Hip  Site:  R greater trochanteric bursa  Prep: Patient was prepped and draped in usual sterile fashion    Ultrasonic Guidance for needle placement: No  Needle size:  22 G  Approach:  Posterior  Medications:  40 mg triamcinolone acetonide 40 mg/mL  Patient tolerance:  Patient tolerated the procedure well with no immediate complications

## 2017-05-02 NOTE — MR AVS SNAPSHOT
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 AM   Appointment    Description:  Male : 1950   Provider:  Joshua Mcnair MD   Department:  [facility]                To Do List           Future Appointments        Provider Department Dept Phone    2017 2:50 PM Nayana Moyer MD [place] - Dermatology [phone]    2017 1:00 PM EKG, APPT [address] - [phone]    2017 1:40 PM Real Simon MD [address] - Arrhythmia [phone]    2017 7:30 AM APPOINTMENT LAB, [facility] [phone]    2017 12:45 PM Oswald Rangel OD [place] - Optometry [phone]      Goals (5 Years of Data)     None      [facility]     [facility] Care Line -  Assistance  Unless otherwise directed by your provider, please contact [facility], our nurse care line that is available for  assistance.     Registered nurses in the [facility] provide: appointment scheduling, clinical advisement, health education, and other advisory services.  Call: [phone] (toll free)               Medications           Message regarding Medications     Verify the changes and/or additions to your medication regime listed below are the same as discussed with your clinician today.  If any of these changes or additions are incorrect, please notify your healthcare provider.             Verify that the below list of medications is an accurate representation of the medications you are currently taking.  If none reported, the list may be blank. If incorrect, please contact your healthcare provider. Carry this list with you in case of emergency.           Current Medications     aspirin (ECOTRIN) 325 MG EC tablet Take 325 mg by mouth once daily.    calcium carbonate 220 mg capsule Take 250 mg by mouth 2 (two) times daily with meals.     celecoxib (CELEBREX) 200 MG capsule Take 200 mg by mouth 2 (two) times daily as needed.     cholecalciferol, vitamin D3, 2,000 unit Cap Take 1 capsule by mouth once daily.    duloxetine (CYMBALTA) 30 MG capsule Take 1 capsule (30 mg total) by mouth every evening.    efinaconazole (JUBLIA) 10 % Ivory aaa on nail qhs    flecainide (TAMBOCOR) 150 MG Tab Take 2 tablets (300 mg total) by mouth daily as needed.    folic acid (FOLVITE) 1 MG tablet Take 1 tablet (1 mg total) by mouth once daily.    HUMIRA 40 mg/0.8 mL injection INJECT 0.8 MLS (40 MG TOTAL) INTO THE SKIN EVERY 14 DAYS    ibuprofen (ADVIL,MOTRIN) 200 MG tablet Take 400 mg by mouth every 6 (six) hours as needed for Pain.    luliconazole (LUZU) 1 % Crea Apply 1 application topically once daily.    methotrexate 2.5 MG Tab TAKE 8 TABLETS (20MG) BY MOUTH EVERY 7 DAYS    metoprolol succinate (TOPROL-XL) 50 MG 24 hr tablet Take 1 tablet (50 mg total) by mouth once daily.    MULTIVITAMIN W-MINERALS/LUTEIN (CENTRUM SILVER ORAL) Take by mouth.    omega-3 fatty acids-vitamin E (SUPER EPA) 1,000-5 mg-unit Cap Take 1 capsule by mouth once daily.     oxycodone-acetaminophen (PERCOCET)  mg per tablet Take 1 tablet by mouth every 4 (four) hours as needed for Pain.    pantoprazole (PROTONIX) 40 MG tablet Take 1 tablet (40 mg total) by mouth once daily.    predniSONE (DELTASONE) 10 MG tablet Take 20 mg by mouth once daily.     predniSONE (DELTASONE) 2.5 MG tablet TAKE ONE TABLET BY MOUTH EVERY DAY           Clinical Reference Information           Allergies as of 5/2/2017     No Known Allergies      Immunizations Administered on Date of Encounter - 5/2/2017     None      Language Assistance Services     ATTENTION: Language assistance services are available, free of charge. Please call [phone].      ATENCIÓN: Si ronella luisito, tiene a middleton disposición servicios gratuitos de asistencia lingüística. Llame al [phone].     CHÚ Ý: N?u b?n nói Ti?ng Vi?t, có các d?ch  v? h? tr? randolph ng? mi?n phí luish cho b?n. G?i s? [phone].         [facility] complies with applicable Federal civil rights laws and does not discriminate on the basis of race, color, national origin, age, disability, or sex.

## 2017-05-18 ENCOUNTER — OFFICE VISIT (OUTPATIENT)
Dept: DERMATOLOGY | Facility: CLINIC | Age: 67
End: 2017-05-18
Payer: COMMERCIAL

## 2017-05-18 DIAGNOSIS — Z85.828 PERSONAL HISTORY OF SKIN CANCER: ICD-10-CM

## 2017-05-18 DIAGNOSIS — D18.00 ANGIOMA: ICD-10-CM

## 2017-05-18 DIAGNOSIS — L90.5 SCAR: ICD-10-CM

## 2017-05-18 DIAGNOSIS — D22.9 NEVUS: ICD-10-CM

## 2017-05-18 DIAGNOSIS — L82.1 SK (SEBORRHEIC KERATOSIS): ICD-10-CM

## 2017-05-18 DIAGNOSIS — D23.9 DERMATOFIBROMA: ICD-10-CM

## 2017-05-18 DIAGNOSIS — Z79.60 LONG-TERM USE OF IMMUNOSUPPRESSANT MEDICATION: ICD-10-CM

## 2017-05-18 DIAGNOSIS — L81.4 LENTIGO: Primary | ICD-10-CM

## 2017-05-18 PROCEDURE — 99999 PR PBB SHADOW E&M-EST. PATIENT-LVL II: CPT | Mod: PBBFAC,,, | Performed by: DERMATOLOGY

## 2017-05-18 PROCEDURE — 99214 OFFICE O/P EST MOD 30 MIN: CPT | Mod: S$GLB,,, | Performed by: DERMATOLOGY

## 2017-05-18 NOTE — PROGRESS NOTES
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 Pt denies any new, dry, scaly or bleeding lesions.  C/o lesion on right neck. Present for 1-2 days. Not itching. Just raised.  Not tender or painful,. No tx   Wife is concerned about lesion on left cheek       Review of Systems   Constitutional: Negative for fever, chills, weight loss, fatigue, night sweats and malaise.   Gastrointestinal: Negative for indigestion.   Skin: Positive for activity-related sunscreen use. Negative for daily sunscreen use.   Hematologic/Lymphatic: Negative for adenopathy. Does not bruise/bleed easily.        Objective:    Physical Exam   Constitutional: He appears well-developed and well-nourished. No distress.   Neurological: He is alert and oriented to person, place, and time. He is not disoriented.   Psychiatric: He has a normal mood and affect.   Skin:   Areas Examined (abnormalities noted in diagram):   Scalp / Hair Palpated and Inspected  Head / Face Inspection Performed  Neck Inspection Performed  Chest / Axilla Inspection Performed  Abdomen Inspection Performed  Genitals / Buttocks / Groin Inspection Performed  Back Inspection Performed  RUE Inspected  LUE Inspection Performed  RLE Inspected  Nails and Digits Inspection Performed                           Diagram Legend     Erythematous scaling macule/papule c/w actinic keratosis       Vascular papule c/w angioma      Pigmented verrucoid papule/plaque c/w seborrheic keratosis      Yellow umbilicated papule c/w sebaceous hyperplasia      Irregularly shaped tan macule c/w lentigo     1-2 mm smooth white papules consistent with Milia      Movable subcutaneous cyst with punctum c/w epidermal inclusion cyst      Subcutaneous movable cyst c/w pilar cyst      Firm pink to brown papule c/w dermatofibroma      Pedunculated fleshy papule(s) c/w skin  tag(s)      Evenly pigmented macule c/w junctional nevus     Mildly variegated pigmented, slightly irregular-bordered macule c/w mildly atypical nevus      Flesh colored to evenly pigmented papule c/w intradermal nevus       Pink pearly papule/plaque c/w basal cell carcinoma      Erythematous hyperkeratotic cursted plaque c/w SCC      Surgical scar with no sign of skin cancer recurrence      Open and closed comedones      Inflammatory papules and pustules      Verrucoid papule consistent consistent with wart     Erythematous eczematous patches and plaques     Dystrophic onycholytic nail with subungual debris c/w onychomycosis     Umbilicated papule    Erythematous-base heme-crusted tan verrucoid plaque consistent with inflamed seborrheic keratosis     Erythematous Silvery Scaling Plaque c/w Psoriasis     See annotation      Assessment / Plan:        Lentigo  This is a benign hyperpigmented sun induced lesion. Daily sun protection will reduce the number of new lesions. Treatment of these benign lesions are considered cosmetic.  The nature of sun-induced photo-aging and skin cancers is discussed.  Sun avoidance, protective clothing, and the use of 30-SPF sunscreens is advised. Observe closely for skin damage/changes, and call if such occurs.    Angioma  These are benign vascular lesions that are inherited.  Treatment is not necessary.    SK (seborrheic keratosis)  These are benign inherited growths without a malignant potential. Reassurance given to patient. No treatment is necessary.     Nevus  Discussed ABCDE's of nevi.  Monitor for new mole or moles that are becoming bigger, darker, irritated, or developing irregular borders. Brochure provided.    Personal history of skin cancer  Long-term use of immunosuppressant medication    Scar  Total body skin examination performed today including at least 12 points as noted in physical examination. No lesions suspicious for malignancy noted.    Dermatofibroma  Reassurance  given to patient. No treatment is necessary.   Treatment of benign, asymptomatic lesions may be considered cosmetic.        Pt has lesion on left sclera, states present since childhood and has seen ophto in the past.  Pt has appt and will have lesion rechecked at that appt          Return in about 1 year (around 5/18/2018).

## 2017-05-26 ENCOUNTER — TELEPHONE (OUTPATIENT)
Dept: PHARMACY | Facility: CLINIC | Age: 67
End: 2017-05-26

## 2017-06-12 DIAGNOSIS — I48.0 PAF (PAROXYSMAL ATRIAL FIBRILLATION): Primary | ICD-10-CM

## 2017-06-15 NOTE — PROGRESS NOTES
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      HPI 68 yo male with h/o obesity, Sleep Apnea (on CPAP), atrial fibrillation, Seronegative RA, bradycardia.  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   H/o atrial fibrillation, first diagnosed in late 20's. Attributed to caffeine + lack of sleep.   No recurrence until 1/01. Was seeing Dr. Bolton. Ultimately converted spontaneously. His bp was elevated at that time, placed on Atacand, noted cough. Switched to beta blocker.   6/06 noted to have palpitations, work-up indicated PVC's.  Did well until 9/13, developed recurrence. Xarelto and beta blocker initiated >>  converted back to nsr. At f/u, as he was feeling well, and had CHADSVASc of 0, Xarelto was discontinued and aspirin 81 mg initiated.  Had another episode of atrial fibrillation, terminated with Flecainide 2/16.  No recurrence.  Denies syncope, dizziness, dyspnea.  Notes fatigue.  ECG reveals sinus bradycardia at 45 bpm, SC interval 156 msec, narrow QRS.  CHADSVASc is 1.    Review of Systems   Constitution: Positive for malaise/fatigue. Negative for weakness.   Eyes: Negative for blurred vision and double vision.   Cardiovascular: Negative for chest pain, dyspnea on exertion, irregular heartbeat, leg swelling, near-syncope, orthopnea, palpitations, paroxysmal nocturnal dyspnea and syncope.   Respiratory: Negative for cough.    Neurological: Negative for dizziness and light-headedness.   All other systems reviewed and are negative.       Objective:    Physical Exam   Constitutional: He is oriented to person, place, and time. He appears well-developed and well-nourished.   Eyes: Conjunctivae are normal. No scleral icterus.   Neck: No JVD present. No tracheal deviation present.   Cardiovascular: Regular rhythm and normal heart sounds.  Bradycardia present.  PMI is not displaced.    Pulmonary/Chest: Effort normal and breath sounds normal. No  respiratory distress.   Abdominal: Soft. There is no hepatosplenomegaly. There is no tenderness.   Musculoskeletal: He exhibits no edema (lower extremity) or tenderness.   Neurological: He is alert and oriented to person, place, and time.   Skin: Skin is warm and dry. No rash noted.   Psychiatric: He has a normal mood and affect. His behavior is normal.         Assessment:       1. Paroxysmal atrial fibrillation    2. Obstructive sleep apnea syndrome    3. Non morbid obesity, unspecified obesity type    4. Palpitations         Plan:       Doing well.  His CHADSVASc is now 1.  Given paucity of events, would not endorse anticoagulation.  Given the bradycardia, decrease Toprol to 25 mg daily.  If notes no symptomatic benefit, can resume back to 50 mg daily.  F/u in one year.

## 2017-06-16 ENCOUNTER — OFFICE VISIT (OUTPATIENT)
Dept: ELECTROPHYSIOLOGY | Facility: CLINIC | Age: 67
End: 2017-06-16
Payer: COMMERCIAL

## 2017-06-16 VITALS
WEIGHT: 235.88 LBS | BODY MASS INDEX: 31.26 KG/M2 | SYSTOLIC BLOOD PRESSURE: 126 MMHG | DIASTOLIC BLOOD PRESSURE: 78 MMHG | HEART RATE: 45 BPM | HEIGHT: 73 IN

## 2017-06-16 DIAGNOSIS — E66.9 NON MORBID OBESITY, UNSPECIFIED OBESITY TYPE: Chronic | ICD-10-CM

## 2017-06-16 DIAGNOSIS — R00.2 PALPITATIONS: ICD-10-CM

## 2017-06-16 DIAGNOSIS — I48.0 PAF (PAROXYSMAL ATRIAL FIBRILLATION): ICD-10-CM

## 2017-06-16 DIAGNOSIS — I48.0 PAROXYSMAL ATRIAL FIBRILLATION: Primary | ICD-10-CM

## 2017-06-16 DIAGNOSIS — R00.1 BRADYCARDIA: ICD-10-CM

## 2017-06-16 DIAGNOSIS — G47.33 OBSTRUCTIVE SLEEP APNEA SYNDROME: ICD-10-CM

## 2017-06-16 PROCEDURE — 99999 PR PBB SHADOW E&M-EST. PATIENT-LVL III: CPT | Mod: PBBFAC,,, | Performed by: INTERNAL MEDICINE

## 2017-06-16 PROCEDURE — 99214 OFFICE O/P EST MOD 30 MIN: CPT | Mod: S$GLB,,, | Performed by: INTERNAL MEDICINE

## 2017-06-16 PROCEDURE — 93000 ELECTROCARDIOGRAM COMPLETE: CPT | Mod: S$GLB,,, | Performed by: INTERNAL MEDICINE

## 2017-06-16 RX ORDER — METOPROLOL SUCCINATE 25 MG/1
25 TABLET, EXTENDED RELEASE ORAL DAILY
Qty: 90 TABLET | Refills: 3 | Status: SHIPPED | OUTPATIENT
Start: 2017-06-16 | End: 2018-08-06 | Stop reason: SDUPTHER

## 2017-06-16 RX ORDER — FLECAINIDE ACETATE 150 MG/1
300 TABLET ORAL DAILY PRN
Qty: 30 TABLET | Refills: 6 | Status: SHIPPED | OUTPATIENT
Start: 2017-06-16 | End: 2018-04-09 | Stop reason: SDUPTHER

## 2017-06-19 ENCOUNTER — PATIENT MESSAGE (OUTPATIENT)
Dept: RHEUMATOLOGY | Facility: CLINIC | Age: 67
End: 2017-06-19

## 2017-06-19 DIAGNOSIS — M06.9 RHEUMATOID ARTHRITIS INVOLVING MULTIPLE JOINTS: Primary | ICD-10-CM

## 2017-06-19 RX ORDER — METHOTREXATE 2.5 MG/1
TABLET ORAL
Qty: 96 TABLET | Refills: 0 | Status: SHIPPED | OUTPATIENT
Start: 2017-06-19 | End: 2017-10-18 | Stop reason: SDUPTHER

## 2017-06-26 ENCOUNTER — LAB VISIT (OUTPATIENT)
Dept: LAB | Facility: HOSPITAL | Age: 67
End: 2017-06-26
Attending: INTERNAL MEDICINE
Payer: COMMERCIAL

## 2017-06-26 DIAGNOSIS — G56.03 BILATERAL CARPAL TUNNEL SYNDROME: ICD-10-CM

## 2017-06-26 DIAGNOSIS — M06.9 RHEUMATOID ARTHRITIS INVOLVING MULTIPLE JOINTS: ICD-10-CM

## 2017-06-26 LAB
ALBUMIN SERPL BCP-MCNC: 4 G/DL
ALP SERPL-CCNC: 76 U/L
ALT SERPL W/O P-5'-P-CCNC: 34 U/L
ANION GAP SERPL CALC-SCNC: 8 MMOL/L
AST SERPL-CCNC: 43 U/L
BASOPHILS # BLD AUTO: 0.03 K/UL
BASOPHILS NFR BLD: 0.4 %
BILIRUB SERPL-MCNC: 0.6 MG/DL
BUN SERPL-MCNC: 12 MG/DL
CALCIUM SERPL-MCNC: 9.8 MG/DL
CHLORIDE SERPL-SCNC: 104 MMOL/L
CO2 SERPL-SCNC: 28 MMOL/L
CREAT SERPL-MCNC: 1 MG/DL
CRP SERPL-MCNC: 0.9 MG/L
DIFFERENTIAL METHOD: ABNORMAL
EOSINOPHIL # BLD AUTO: 0.2 K/UL
EOSINOPHIL NFR BLD: 2.2 %
ERYTHROCYTE [DISTWIDTH] IN BLOOD BY AUTOMATED COUNT: 15.1 %
ERYTHROCYTE [SEDIMENTATION RATE] IN BLOOD BY WESTERGREN METHOD: 12 MM/HR
EST. GFR  (AFRICAN AMERICAN): >60 ML/MIN/1.73 M^2
EST. GFR  (NON AFRICAN AMERICAN): >60 ML/MIN/1.73 M^2
GLUCOSE SERPL-MCNC: 122 MG/DL
HCT VFR BLD AUTO: 39.5 %
HGB BLD-MCNC: 13.4 G/DL
LYMPHOCYTES # BLD AUTO: 2.2 K/UL
LYMPHOCYTES NFR BLD: 28.1 %
MCH RBC QN AUTO: 31.2 PG
MCHC RBC AUTO-ENTMCNC: 33.9 %
MCV RBC AUTO: 92 FL
MONOCYTES # BLD AUTO: 0.5 K/UL
MONOCYTES NFR BLD: 6.5 %
NEUTROPHILS # BLD AUTO: 4.8 K/UL
NEUTROPHILS NFR BLD: 62.5 %
PLATELET # BLD AUTO: 177 K/UL
PMV BLD AUTO: 10.2 FL
POTASSIUM SERPL-SCNC: 3.8 MMOL/L
PROT SERPL-MCNC: 7.2 G/DL
RBC # BLD AUTO: 4.3 M/UL
SODIUM SERPL-SCNC: 140 MMOL/L
WBC # BLD AUTO: 7.71 K/UL

## 2017-06-26 PROCEDURE — 86140 C-REACTIVE PROTEIN: CPT

## 2017-06-26 PROCEDURE — 36415 COLL VENOUS BLD VENIPUNCTURE: CPT

## 2017-06-26 PROCEDURE — 85652 RBC SED RATE AUTOMATED: CPT

## 2017-06-26 PROCEDURE — 86480 TB TEST CELL IMMUN MEASURE: CPT

## 2017-06-26 PROCEDURE — 80053 COMPREHEN METABOLIC PANEL: CPT

## 2017-06-26 PROCEDURE — 85025 COMPLETE CBC W/AUTO DIFF WBC: CPT

## 2017-06-27 ENCOUNTER — OFFICE VISIT (OUTPATIENT)
Dept: OPTOMETRY | Facility: CLINIC | Age: 67
End: 2017-06-27
Payer: COMMERCIAL

## 2017-06-27 ENCOUNTER — TELEPHONE (OUTPATIENT)
Dept: PHARMACY | Facility: CLINIC | Age: 67
End: 2017-06-27

## 2017-06-27 ENCOUNTER — TELEPHONE (OUTPATIENT)
Dept: RHEUMATOLOGY | Facility: CLINIC | Age: 67
End: 2017-06-27

## 2017-06-27 DIAGNOSIS — H35.363 DRUSEN OF MACULA OF BOTH EYES: ICD-10-CM

## 2017-06-27 DIAGNOSIS — H52.4 PRESBYOPIA OU: ICD-10-CM

## 2017-06-27 DIAGNOSIS — H25.13 NUCLEAR SCLEROSIS, BILATERAL: Primary | ICD-10-CM

## 2017-06-27 PROCEDURE — 99999 PR PBB SHADOW E&M-EST. PATIENT-LVL II: CPT | Mod: PBBFAC,,, | Performed by: OPTOMETRIST

## 2017-06-27 PROCEDURE — 92014 COMPRE OPH EXAM EST PT 1/>: CPT | Mod: S$GLB,,, | Performed by: OPTOMETRIST

## 2017-06-27 PROCEDURE — 92015 DETERMINE REFRACTIVE STATE: CPT | Mod: S$GLB,,, | Performed by: OPTOMETRIST

## 2017-06-27 NOTE — PROGRESS NOTES
HPI     DLS:  12/2/16 Dr Rangel, 12/13/16 Dr Alvarado    Pt here for check of ocular health.  Pt without visual complaints OU. Pt   states he wears readers only.  Pt hasn't noticed any changes on the amsler   grid.      (+)floaters  (-)flashes  (-)headaches  (-)eye pain  (-)itching  (-)tearing  (-)burning    No eyedrops  Hx 6th nerve palsy      Last edited by Oswald Rangel, OD on 6/27/2017 11:27 AM. (History)        ROS     Positive for: Eyes (Hx 6th n palsy)    Negative for: Constitutional, Gastrointestinal, Neurological, Skin,   Genitourinary, Musculoskeletal, HENT, Endocrine, Cardiovascular,   Respiratory, Psychiatric, Allergic/Imm, Heme/Lymph    Last edited by Oswald Rangel, OD on 6/27/2017 11:28 AM. (History)        Assessment /Plan     For exam results, see Encounter Report.    Nuclear sclerosis, bilateral    Drusen of macula of both eyes    Presbyopia OU      1. Cat ou--pt wrote new spex Rx  2. Mac drusen OU--stable.   Pt to cont w amsler grid use/vitamins  3. Sp focal laser for periph hole OD.  Stable  4. 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

## 2017-06-28 ENCOUNTER — OFFICE VISIT (OUTPATIENT)
Dept: RHEUMATOLOGY | Facility: CLINIC | Age: 67
End: 2017-06-28
Payer: COMMERCIAL

## 2017-06-28 VITALS
HEIGHT: 73 IN | RESPIRATION RATE: 18 BRPM | HEART RATE: 68 BPM | DIASTOLIC BLOOD PRESSURE: 76 MMHG | WEIGHT: 232 LBS | SYSTOLIC BLOOD PRESSURE: 124 MMHG | BODY MASS INDEX: 30.75 KG/M2

## 2017-06-28 DIAGNOSIS — Z79.631 ENCOUNTER FOR METHOTREXATE MONITORING: ICD-10-CM

## 2017-06-28 DIAGNOSIS — D84.9 IMMUNOCOMPROMISED: ICD-10-CM

## 2017-06-28 DIAGNOSIS — Z51.81 ENCOUNTER FOR METHOTREXATE MONITORING: ICD-10-CM

## 2017-06-28 DIAGNOSIS — M06.00 SERONEGATIVE RHEUMATOID ARTHRITIS: Primary | ICD-10-CM

## 2017-06-28 LAB
MITOGEN NIL: 9.43 IU/ML
NIL: 0.05 IU/ML
TB ANTIGEN NIL: 0.01 IU/ML
TB ANTIGEN: 0.06 IU/ML
TB GOLD: NEGATIVE

## 2017-06-28 PROCEDURE — 99999 PR PBB SHADOW E&M-EST. PATIENT-LVL III: CPT | Mod: PBBFAC,,, | Performed by: INTERNAL MEDICINE

## 2017-06-28 PROCEDURE — 99214 OFFICE O/P EST MOD 30 MIN: CPT | Mod: S$GLB,,, | Performed by: INTERNAL MEDICINE

## 2017-06-28 NOTE — PROGRESS NOTES
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    Chief Complaint: Joint Pain     HPI 64 yo male with PMH of ALLAN, atrial fibrillation on 325 mg qday, l3-l4 laminectomy around 2000, 2009( L5-51 dissecotmy), cervical epidural, right inguinal hernia, and kidney stones.  here for evaluation of shoulder and hip stiffness.  He has trouble with rising and sitting from chair.  He also has trouble putting on his jacket. He feels fatigued.  Symptoms have been on going for 2 months.He also reports trouble with weakness in  in both hands and shooting pain  from his neck down to his arms. The pain wakes him up at night. Reports 20 pounds weight loss.   Feels sometimes that legs are weak.  He has appt with neurosurgeon tomorrow.  No headaches, but maybe mild pressure in temples ( he reports reading about [facility]).  Denies any swelling.  Reports that he had some stiffness in morning that has improved in morning since starting prednisone.    Interval history: He is here for follow up of  Seronegative RA.   Reports he is taking prednisone  3.5 mg a day  For several weeks.   He has stiffness in hands and in a while shooting pain.  He is on MTX 8 pills a week and doing Humira.  He is doing well on current dose.  Pain level is 1/10.  He feels mild tightness in hands.He is taking cymbalta at bedtime.  He uses his cpap machine all the time.        Past Medical History   Diagnosis Date    Sleep apnea     Atrial fibrillation     Colon polyps     Retinal hole      Right eye    Cataract        Review of Systems   Constitutional: Positive for fatigue. Negative for fever, chills and appetite change.   HENT: Negative for hearing loss, mouth sores, rhinorrhea, sinus pressure and trouble swallowing.    Eyes: Negative for photophobia, pain, discharge, itching and visual disturbance.   Respiratory: Negative for cough, chest tightness, wheezing and stridor.    Cardiovascular: Negative for chest pain and palpitations.    Gastrointestinal: Negative for blood in stool and abdominal distention.   Endocrine: Negative for cold intolerance and heat intolerance.   Genitourinary: Negative for dysuria, hematuria and flank pain.   Musculoskeletal: Positive for myalgias, arthralgias and neck pain. Negative for back pain, joint swelling, gait problem and neck stiffness.   Skin: Negative for color change, pallor and rash.   Neurological: Negative for dizziness, light-headedness, numbness and headaches.   Hematological: Negative for adenopathy. Does not bruise/bleed easily.   Psychiatric/Behavioral: Negative for decreased concentration and agitation. The patient is not nervous/anxious.            Objective:     Physical Exam   Constitutional: He is oriented to person, place, and time. No distress.   HENT:   Head: Normocephalic and atraumatic.   Eyes: Conjunctivae are normal. Pupils are equal, round, and reactive to light.   Neck: No tracheal deviation present. No thyromegaly present.   Cardiovascular: Normal rate, regular rhythm and normal heart sounds.  Exam reveals no gallop and no friction rub.    No murmur heard.  Pulmonary/Chest: No stridor. No respiratory distress. He has no wheezes. He has no rales. He exhibits no tenderness.   Abdominal: Soft. He exhibits no distension. There is no tenderness. There is no rebound.   Neurological: He is alert and oriented to person, place, and time.   Decreased  strength in both hands   Skin: Skin is warm. He is not diaphoretic. ; bulls eye appearing lesion in left groin; erythematous lacy lesion on left foot and small punctate lesions  In left sole  Musculoskeletal:    Shoulders-abduction to 160 degrees bilaterally (worse than last visit)  Trouble rising from chair (resolved)  Motor- 5/5 strength in upper and lower extremity proximally but has decreased  strength (improved since last visit)  Hands- able to make almost full fist now bilaterally  Wrists- no synovitis         8/2015  Ina-+    subserologies-negative  Esr- 40<51<24  crp- 5<35  Rf,ccp-negative  cpk- 115<92  < 676 (2013)  Aldolase-wnl  spep-wnl  Irma-negative       Thoracic xray (2015): There are anterior flowing osteophytes in the midthoracic spine.    Xrays: (shoulder) -9/2015:  Small high density foci along the margin of the humeral head bilaterally suggestive for calcific tendinitis more prominent on the right. There is   degenerative change of the AC joints also more prominent on the right with hypertrophic spurring.    Mri (cervical):  (2015)Degenerative changes of the cervical spine, most prominent at C5-6 causing mild central spinal canal stenosis.      Mri lumbar:(2015)   Multilevel, moderate to severe lumbar spondylosis is present, as detailed above:  -- severe neural foraminal narrowing on the left at L1-2, on the right at L3-4, bilaterally at L4-5 and on the right at L5-S1.  -- multilevel, moderately severe acquired spinal canal stenosis most pronounced at L4-5.  -- multilevel moderate to severe bilateral facet arthrosis.  -- postoperative changes of left L5 laminectomy and suspected laminotomy at L3.     CT abdomen (2015):visualized portion of the aorta is significant for mild calcification and plaque formation. The right and left kidneys each contain two punctate stones which measure approximately 0.2 cm. The right kidney contains an exophytic 1.4-cm hypodensity extending off the inferior pole which is incompletely characterized but likely represents a cyst. The left kidney contains a 1 cm hypodensity within the superior pole which is incompletely characterized but likely represents a cyst. The     Chest xray (2015): WNL   emg/ncs: (10/20/2015): predominantly sensory axonal polyneuropathy; mild bilateral carpal tunnel syndrome; chronic right c7-c8 radiculopathy may also be present    Bone scan (10/2015):.Degenerative changes in the glenohumeral and SI joints.    Assessment:      68 yo male with PMH of ALLAN, atrial fibrillation,  CTS, cervical radiculopathy, polyneuropathy, renal cysts   here for  For follow up of seronegative RA. Patient initially presented with shoulder and hip stiffness, decreased  strength of both hands,  Hand swelling/stiffness ,anorexia with weight loss.  He is on  MTX 8 pills a week and started on  Humira on April 3, 2016 and tolerating it well. He reports continued pain in hands and shoulder pain.  I told him I do not think it is from his RA given inflammatory markers are normal and on exam I do not detect synovitis.  Will continue to wean down the prednisone.      1) Seronegative RA:  - wean prednisone from 3.5 mg a day to 3mg a day  -continue MTX 8 pills a week with folic acid  -continue Humira 40mg sub q every 14 day (started April 3, 2016)  --referral to Dr. Cedillo for CTS if needed in future  Discussed with patient that we can can consider enbrel in the future  -labs before next appt        2.fibromyalgia a-controlled.  -continue cymbalta 30mg a day.    rtc in 12   weeks

## 2017-07-06 RX ORDER — PREDNISONE 2.5 MG/1
TABLET ORAL
Qty: 90 TABLET | Refills: 0 | Status: SHIPPED | OUTPATIENT
Start: 2017-07-06 | End: 2017-10-18 | Stop reason: DRUGHIGH

## 2017-07-27 ENCOUNTER — TELEPHONE (OUTPATIENT)
Dept: PHARMACY | Facility: CLINIC | Age: 67
End: 2017-07-27

## 2017-07-28 ENCOUNTER — TELEPHONE (OUTPATIENT)
Dept: OBSTETRICS AND GYNECOLOGY | Facility: CLINIC | Age: 67
End: 2017-07-28

## 2017-07-28 DIAGNOSIS — R07.81 RIB PAIN ON RIGHT SIDE: Primary | ICD-10-CM

## 2017-07-28 NOTE — TELEPHONE ENCOUNTER
Patient had fall at home. Experiencing pain on right side.     Requesting chest x-ray with views of right ribs.    Please advise.

## 2017-07-31 ENCOUNTER — TELEPHONE (OUTPATIENT)
Dept: FAMILY MEDICINE | Facility: CLINIC | Age: 67
End: 2017-07-31

## 2017-07-31 ENCOUNTER — PATIENT MESSAGE (OUTPATIENT)
Dept: ADMINISTRATIVE | Facility: OTHER | Age: 67
End: 2017-07-31

## 2017-07-31 NOTE — TELEPHONE ENCOUNTER
I still see that these x-rays are listed as ordered and not completed.  Did anybody get back to him to get these x-rays done?

## 2017-08-02 ENCOUNTER — HOSPITAL ENCOUNTER (OUTPATIENT)
Dept: RADIOLOGY | Facility: HOSPITAL | Age: 67
Discharge: HOME | End: 2017-08-02
Attending: FAMILY MEDICINE
Payer: COMMERCIAL

## 2017-08-02 DIAGNOSIS — R07.81 RIB PAIN ON RIGHT SIDE: ICD-10-CM

## 2017-08-02 PROCEDURE — 71100 X-RAY EXAM RIBS UNI 2 VIEWS: CPT | Mod: 26,,, | Performed by: RADIOLOGY

## 2017-08-02 PROCEDURE — 71100 X-RAY EXAM RIBS UNI 2 VIEWS: CPT | Mod: TC

## 2017-08-02 PROCEDURE — 71020 XR CHEST PA AND LATERAL: CPT | Mod: 26,,, | Performed by: RADIOLOGY

## 2017-08-02 PROCEDURE — 71020 XR CHEST PA AND LATERAL: CPT | Mod: TC

## 2017-08-03 ENCOUNTER — TELEPHONE (OUTPATIENT)
Dept: FAMILY MEDICINE | Facility: CLINIC | Age: 67
End: 2017-08-03

## 2017-08-22 ENCOUNTER — TELEPHONE (OUTPATIENT)
Dept: PHARMACY | Facility: CLINIC | Age: 67
End: 2017-08-22

## 2017-08-30 ENCOUNTER — DOCUMENTATION ONLY (OUTPATIENT)
Dept: REHABILITATION | Facility: HOSPITAL | Age: 67
End: 2017-08-30

## 2017-08-30 DIAGNOSIS — M25.512 CHRONIC PAIN OF BOTH SHOULDERS: ICD-10-CM

## 2017-08-30 DIAGNOSIS — R29.3 POOR POSTURE: ICD-10-CM

## 2017-08-30 DIAGNOSIS — M54.2 PAINFUL CERVICAL ROM: ICD-10-CM

## 2017-08-30 DIAGNOSIS — G89.29 CHRONIC PAIN OF BOTH SHOULDERS: ICD-10-CM

## 2017-08-30 DIAGNOSIS — M25.511 CHRONIC PAIN OF BOTH SHOULDERS: ICD-10-CM

## 2017-08-30 NOTE — PROGRESS NOTES
REHAB SERVICES OUTPATIENT DISCHARGE SUMMARY  Physical Therapy      Name:  Franko Buchanan MD  Date:  8/30/17  Date of Evaluation:  12/30/16  Physician:  Ines Strong MD  Total # Of Visits:  8  Diagnosis:    1. Painful cervical ROM     2. Chronic pain of both shoulders     3. Poor posture         Physical/Functional Status:  At time of discharge, patient status unknown secondary to pt not attending PT since 2/21/17. Pt cancelled last scheduled appointment.     The patient is to be discharged from our Therapy service for the following reason(s):  Patient has not attended therapy since 2/21/17    Degree of Goal Achievement:  Patient has partially met goals    Patient Education:  HEP    Discharge Plan:  Other:  Pt is discharged from PT at this time.

## 2017-09-20 ENCOUNTER — TELEPHONE (OUTPATIENT)
Dept: PHARMACY | Facility: CLINIC | Age: 67
End: 2017-09-20

## 2017-10-02 DIAGNOSIS — M06.4 UNDIFFERENTIATED INFLAMMATORY POLYARTHRITIS: ICD-10-CM

## 2017-10-02 RX ORDER — PANTOPRAZOLE SODIUM 40 MG/1
TABLET, DELAYED RELEASE ORAL
Qty: 90 TABLET | Refills: 10 | Status: SHIPPED | OUTPATIENT
Start: 2017-10-02 | End: 2018-08-14

## 2017-10-02 RX ORDER — DULOXETIN HYDROCHLORIDE 30 MG/1
CAPSULE, DELAYED RELEASE ORAL
Qty: 90 CAPSULE | Refills: 2 | Status: SHIPPED | OUTPATIENT
Start: 2017-10-02 | End: 2018-06-22

## 2017-10-17 ENCOUNTER — OFFICE VISIT (OUTPATIENT)
Dept: FAMILY MEDICINE | Facility: CLINIC | Age: 67
End: 2017-10-17
Payer: COMMERCIAL

## 2017-10-17 ENCOUNTER — LAB VISIT (OUTPATIENT)
Dept: LAB | Facility: HOSPITAL | Age: 67
End: 2017-10-17
Attending: FAMILY MEDICINE
Payer: COMMERCIAL

## 2017-10-17 VITALS
HEIGHT: 73 IN | HEART RATE: 56 BPM | WEIGHT: 248.25 LBS | BODY MASS INDEX: 32.9 KG/M2 | TEMPERATURE: 99 F | DIASTOLIC BLOOD PRESSURE: 78 MMHG | OXYGEN SATURATION: 96 % | SYSTOLIC BLOOD PRESSURE: 134 MMHG

## 2017-10-17 DIAGNOSIS — N28.1 RENAL CYST: ICD-10-CM

## 2017-10-17 DIAGNOSIS — I48.0 PAROXYSMAL ATRIAL FIBRILLATION: ICD-10-CM

## 2017-10-17 DIAGNOSIS — G47.33 OBSTRUCTIVE SLEEP APNEA SYNDROME: ICD-10-CM

## 2017-10-17 DIAGNOSIS — Z12.11 COLON CANCER SCREENING: ICD-10-CM

## 2017-10-17 DIAGNOSIS — M50.30 DDD (DEGENERATIVE DISC DISEASE), CERVICAL: ICD-10-CM

## 2017-10-17 DIAGNOSIS — Z00.00 ROUTINE GENERAL MEDICAL EXAMINATION AT A HEALTH CARE FACILITY: Primary | ICD-10-CM

## 2017-10-17 DIAGNOSIS — M06.00 SERONEGATIVE RHEUMATOID ARTHRITIS: ICD-10-CM

## 2017-10-17 DIAGNOSIS — Z79.52 LONG TERM SYSTEMIC STEROID USER: ICD-10-CM

## 2017-10-17 DIAGNOSIS — Z79.631 LONG TERM METHOTREXATE USER: ICD-10-CM

## 2017-10-17 DIAGNOSIS — Z00.00 ROUTINE GENERAL MEDICAL EXAMINATION AT A HEALTH CARE FACILITY: ICD-10-CM

## 2017-10-17 DIAGNOSIS — Z11.1 SCREENING-PULMONARY TB: ICD-10-CM

## 2017-10-17 LAB
CHOLEST SERPL-MCNC: 195 MG/DL
CHOLEST/HDLC SERPL: 3.3 {RATIO}
COMPLEXED PSA SERPL-MCNC: 0.27 NG/ML
HDLC SERPL-MCNC: 59 MG/DL
HDLC SERPL: 30.3 %
LDLC SERPL CALC-MCNC: 115.4 MG/DL
NONHDLC SERPL-MCNC: 136 MG/DL
TRIGL SERPL-MCNC: 103 MG/DL
TSH SERPL DL<=0.005 MIU/L-ACNC: 1.6 UIU/ML

## 2017-10-17 PROCEDURE — 83036 HEMOGLOBIN GLYCOSYLATED A1C: CPT

## 2017-10-17 PROCEDURE — 90670 PCV13 VACCINE IM: CPT | Mod: S$GLB,,, | Performed by: FAMILY MEDICINE

## 2017-10-17 PROCEDURE — 80061 LIPID PANEL: CPT

## 2017-10-17 PROCEDURE — 99999 PR PBB SHADOW E&M-EST. PATIENT-LVL III: CPT | Mod: PBBFAC,,, | Performed by: FAMILY MEDICINE

## 2017-10-17 PROCEDURE — 90662 IIV NO PRSV INCREASED AG IM: CPT | Mod: S$GLB,,, | Performed by: FAMILY MEDICINE

## 2017-10-17 PROCEDURE — 84443 ASSAY THYROID STIM HORMONE: CPT

## 2017-10-17 PROCEDURE — 36415 COLL VENOUS BLD VENIPUNCTURE: CPT

## 2017-10-17 PROCEDURE — 84153 ASSAY OF PSA TOTAL: CPT

## 2017-10-17 PROCEDURE — 99397 PER PM REEVAL EST PAT 65+ YR: CPT | Mod: 25,S$GLB,, | Performed by: FAMILY MEDICINE

## 2017-10-17 PROCEDURE — 90471 IMMUNIZATION ADMIN: CPT | Mod: S$GLB,,, | Performed by: FAMILY MEDICINE

## 2017-10-17 PROCEDURE — 90472 IMMUNIZATION ADMIN EACH ADD: CPT | Mod: S$GLB,,, | Performed by: FAMILY MEDICINE

## 2017-10-17 NOTE — PROGRESS NOTES
(Portions of this note were dictated using voice recognition software and may contain dictation related errors in spelling/grammar/syntax not found on text review)    CC:   Chief Complaint   Patient presents with    Annual Exam       HPI: 67 y.o. male here for annual exam     Atrial fibrillation, paroxysmal, on flecainide as a pill in the pocket treatment, rarely needs this.  On metoprolol as well for rate control.     Follows with rheumatology for seronegative RA.  He is on Humira, methotrexate, prednisone.  Trying to wean down the prednisone at this time.  also on Cymbalta for his arthritis pains      Sleep apnea, compliant with CPAP, finds that this is helping.  He uses this every night.      Right trochanteric bursitis eval last yr. Saw ortho may 2017 and had injection done and referral to PT.      Bilateral hip osteoarthritis, followed with [facility], concerned that he may need a hip replacement but is waiting a few more years     He does have lumbar and cervical DDD and spinal stenosis noted on prior MRI imaging in 2015.          Past Medical History:   Diagnosis Date    Atrial fibrillation     1st diagnosed in med school    Basal cell carcinoma     right temporal scalp    Cataract     Colon polyps     Retinal hole     Right eye    Seronegative arthritis     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    Sleep apnea        Past Surgical History:   Procedure Laterality Date    BACK SURGERY      1990's and 2009; last by Naldo    Focal Laser       Right eye    HERNIA REPAIR Right     inguinal    SKIN CANCER EXCISION      SPINE SURGERY      l3-4/ l5-s1  (x 2)    TONSILLECTOMY         Family History   Problem Relation Age of Onset    Colon polyps Father     Cancer Father      leukemia    Glaucoma Mother     Thyroid disease Sister      hashimoto    Cancer Sister      renal    No Known Problems Sister     Heart failure Maternal Grandfather     Cataracts Maternal Grandmother      Alzheimer's disease Maternal Grandmother     No Known Problems Maternal Aunt     No Known Problems Maternal Uncle     No Known Problems Paternal Aunt     No Known Problems Paternal Uncle     No Known Problems Paternal Grandmother     Cancer Paternal Grandfather      colon    Diabetes Neg Hx     Heart disease Neg Hx     Amblyopia Neg Hx     Blindness Neg Hx     Macular degeneration Neg Hx     Retinal detachment Neg Hx     Strabismus Neg Hx     Hypertension Neg Hx     Stroke Neg Hx        Social History     Social History    Marital status:      Spouse name: Joyce    Number of children: N/A    Years of education: N/A     Occupational History    [employer]     Social History Main Topics    Smoking status: Never Smoker    Smokeless tobacco: Never Used    Alcohol use 3.0 oz/week     6 Standard drinks or equivalent per week      Comment: 3 X WEEK     Drug use: No    Sexual activity: Not on file     Other Topics Concern    Not on file     Social History Narrative    No narrative on file       HEALTH SCREENINGS  Immunizations:  Tdap 2014  Flu shot, today  PCV today  PPSV    Age/Gender Appropriate screenings:  Prostate screening    Colonoscopy 2012, repeat 2017     Labs:    Lab Results   Component Value Date    WBC 7.71 06/26/2017    HGB 13.4 (L) 06/26/2017    HCT 39.5 (L) 06/26/2017     06/26/2017    CHOL 181 09/19/2016    TRIG 151 (H) 09/19/2016    HDL 52 09/19/2016    ALT 34 06/26/2017    AST 43 (H) 06/26/2017     06/26/2017    K 3.8 06/26/2017     06/26/2017    CREATININE 1.0 06/26/2017    BUN 12 06/26/2017    CO2 28 06/26/2017    TSH 2.062 09/19/2016    PSA 0.28 09/19/2016    INR 1.1 03/20/2012    HGBA1C 5.6 09/29/2015    LDLCALC 98.8 09/19/2016     (H) 06/26/2017     Vitamin D was normal in 9/19/16          Vital signs reviewed  PE:   APPEARANCE: Well nourished, well developed, in no acute distress.    HEAD: Normocephalic,  atraumatic.  EYES: PERRL. EOMI.   Conjunctivae noninjected.  EARS: TM's intact. Light reflex normal. No retraction or perforation.    NOSE: Mucosa pink. Airway clear.  MOUTH & THROAT: No tonsillar enlargement. No pharyngeal erythema or exudate.   NECK: Supple with no cervical lymphadenopathy.  No carotid bruits, no thyromegaly  CHEST: Good inspiratory effort. Lungs clear to auscultation with no wheezes or crackles.  CARDIOVASCULAR: Normal S1, S2. No rubs, murmurs, or gallops.  ABDOMEN: Bowel sounds normal. Not distended. Soft. No tenderness or masses. No organomegaly.  EXTREMITIES: No edema, cyanosis, or clubbing.  PROSTATE: Smooth, symmetric, non-enlarged, nontender      IMPRESSION  1. Routine general medical examination at a health care facility    2. Long term methotrexate user    3. DDD (degenerative disc disease), cervical    4. Seronegative rheumatoid arthritis    5. Obstructive sleep apnea syndrome    6. Paroxysmal atrial fibrillation    7. Long term systemic steroid user    8. Colon cancer screening    9. Renal cyst        PLAN  Orders Placed This Encounter   Procedures    US Kidney Only    Pneumococcal Conjugate Vaccine (13 Valent) (IM)    Influenza - High Dose (65+) (PF) (IM)    Lipid panel    PSA, Screening    TSH    Hemoglobin A1c     Atrial fibrillation: Stable.  Continue current therapy    Rheumatoid arthritis: Continue current therapy.  Continue dermatology follow-up as directed    Hip osteoarthritis: He will continue to follow-up with orthopedics for further intervention as needed.  He can continue NSAIDs but just caution on overuse or persistent use of NSAIDs.    He has CBC, CMP, sedimentation rate, CRP ordered from his rheumatologist.  We will add the above labs in addition to this    Prostate screening today    Colonoscopy referral placed    Also has noted a renal cyst noted on imaging in 2015.  This was apparently stable.  Has not had follow-up since then.  He had seen urology at the time  recommended repeat imaging in a year.  I will order renal ultrasound to continue to follow this.  If stable, can withhold further follow-up after that unless he has any specific problems    Immunizations: Needs flu shot and Prevnar.  Pneumovax next year.  No Zostavax given immunosuppressive status

## 2017-10-18 ENCOUNTER — TELEPHONE (OUTPATIENT)
Dept: PHARMACY | Facility: CLINIC | Age: 67
End: 2017-10-18

## 2017-10-18 ENCOUNTER — OFFICE VISIT (OUTPATIENT)
Dept: RHEUMATOLOGY | Facility: CLINIC | Age: 67
End: 2017-10-18
Payer: COMMERCIAL

## 2017-10-18 VITALS
HEIGHT: 72 IN | HEART RATE: 64 BPM | RESPIRATION RATE: 18 BRPM | WEIGHT: 246 LBS | SYSTOLIC BLOOD PRESSURE: 126 MMHG | DIASTOLIC BLOOD PRESSURE: 84 MMHG | BODY MASS INDEX: 33.32 KG/M2

## 2017-10-18 DIAGNOSIS — M06.9 RHEUMATOID ARTHRITIS INVOLVING MULTIPLE JOINTS: Primary | ICD-10-CM

## 2017-10-18 DIAGNOSIS — Z51.81 ENCOUNTER FOR METHOTREXATE MONITORING: Primary | ICD-10-CM

## 2017-10-18 DIAGNOSIS — Z79.631 ENCOUNTER FOR METHOTREXATE MONITORING: Primary | ICD-10-CM

## 2017-10-18 DIAGNOSIS — M06.9 RHEUMATOID ARTHRITIS INVOLVING MULTIPLE JOINTS: ICD-10-CM

## 2017-10-18 DIAGNOSIS — Z79.620 ADALIMUMAB (HUMIRA) LONG-TERM USE: ICD-10-CM

## 2017-10-18 LAB
ESTIMATED AVG GLUCOSE: 103 MG/DL
HBA1C MFR BLD HPLC: 5.2 %

## 2017-10-18 PROCEDURE — 99999 PR PBB SHADOW E&M-EST. PATIENT-LVL III: CPT | Mod: PBBFAC,,, | Performed by: INTERNAL MEDICINE

## 2017-10-18 PROCEDURE — 99214 OFFICE O/P EST MOD 30 MIN: CPT | Mod: S$GLB,,, | Performed by: INTERNAL MEDICINE

## 2017-10-18 RX ORDER — METHOTREXATE 2.5 MG/1
TABLET ORAL
Qty: 96 TABLET | Refills: 0 | Status: SHIPPED | OUTPATIENT
Start: 2017-10-18 | End: 2018-02-14 | Stop reason: SDUPTHER

## 2017-10-18 RX ORDER — PREDNISONE 2.5 MG/1
2 TABLET ORAL DAILY
COMMUNITY
End: 2017-10-18 | Stop reason: SDUPTHER

## 2017-10-18 RX ORDER — PREDNISONE 1 MG/1
2 TABLET ORAL DAILY
Qty: 60 TABLET | Refills: 0 | Status: SHIPPED | OUTPATIENT
Start: 2017-10-18 | End: 2018-08-14

## 2017-10-18 NOTE — PROGRESS NOTES
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    Chief Complaint: Joint Pain     HPI 64 yo male with PMH of ALLAN, atrial fibrillation on 325 mg qday, l3-l4 laminectomy around 2000, 2009( L5-51 dissecotmy), cervical epidural, right inguinal hernia, and kidney stones.  here for evaluation of shoulder and hip stiffness.  He has trouble with rising and sitting from chair.  He also has trouble putting on his jacket. He feels fatigued.  Symptoms have been on going for 2 months.He also reports trouble with weakness in  in both hands and shooting pain  from his neck down to his arms. The pain wakes him up at night. Reports 20 pounds weight loss.   Feels sometimes that legs are weak.  He has appt with neurosurgeon tomorrow.  No headaches, but maybe mild pressure in temples ( he reports reading about [facility]).  Denies any swelling.  Reports that he had some stiffness in morning that has improved in morning since starting prednisone.    Interval history: He is here for follow up of  Seronegative RA.  He is saw Dr. Ramires(\[facility]"" orthopedic) who said he is candidate is for surgery.  He reports stiffness all day long. He reports  He also reports having swelling.  He is taking prednisone 2mg a day.  He is on MTX 8 pills a week and doing Humira.  He is taking cymbalta at bedtime.  He uses his cpap machine all the time.        Past Medical History   Diagnosis Date    Sleep apnea     Atrial fibrillation     Colon polyps     Retinal hole      Right eye    Cataract        Review of Systems   Constitutional: Positive for fatigue. Negative for fever, chills and appetite change.   HENT: Negative for hearing loss, mouth sores, rhinorrhea, sinus pressure and trouble swallowing.    Eyes: Negative for photophobia, pain, discharge, itching and visual disturbance.   Respiratory: Negative for cough, chest tightness, wheezing and stridor.    Cardiovascular: Negative for chest pain and palpitations.   Gastrointestinal:  Negative for blood in stool and abdominal distention.   Endocrine: Negative for cold intolerance and heat intolerance.   Genitourinary: Negative for dysuria, hematuria and flank pain.   Musculoskeletal: Positive for myalgias, arthralgias and neck pain. Negative for back pain, joint swelling, gait problem and neck stiffness.   Skin: Negative for color change, pallor and rash.   Neurological: Negative for dizziness, light-headedness, numbness and headaches.   Hematological: Negative for adenopathy. Does not bruise/bleed easily.   Psychiatric/Behavioral: Negative for decreased concentration and agitation. The patient is not nervous/anxious.            Objective:     Physical Exam   Constitutional: He is oriented to person, place, and time. No distress.   HENT:   Head: Normocephalic and atraumatic.   Eyes: Conjunctivae are normal. Pupils are equal, round, and reactive to light.   Neck: No tracheal deviation present. No thyromegaly present.   Cardiovascular: Normal rate, regular rhythm and normal heart sounds.  Exam reveals no gallop and no friction rub.    No murmur heard.  Pulmonary/Chest: No stridor. No respiratory distress. He has no wheezes. He has no rales. He exhibits no tenderness.   Abdominal: Soft. He exhibits no distension. There is no tenderness. There is no rebound.   Neurological: He is alert and oriented to person, place, and time.   Decreased  strength in both hands   Skin: Skin is warm. He is not diaphoretic. ; bulls eye appearing lesion in left groin; erythematous lacy lesion on left foot and small punctate lesions  In left sole  Musculoskeletal:    Shoulders-abduction to 160 degrees bilaterally (worse than last visit)  Trouble rising from chair (resolved)  Motor- 5/5 strength in upper and lower extremity proximally but has decreased  strength (improved since last visit)  Hands- able to make almost full fist now bilaterally  Wrists- no synovitis         8/2015  Ina-+    subserologies-negative  Esr- 40<51<24  crp- 5<35  Rf,ccp-negative  cpk- 115<92  < 676 (2013)  Aldolase-wnl  spep-wnl  Irma-negative       Thoracic xray (2015): There are anterior flowing osteophytes in the midthoracic spine.    Xrays: (shoulder) -9/2015:  Small high density foci along the margin of the humeral head bilaterally suggestive for calcific tendinitis more prominent on the right. There is   degenerative change of the AC joints also more prominent on the right with hypertrophic spurring.    Mri (cervical):  (2015)Degenerative changes of the cervical spine, most prominent at C5-6 causing mild central spinal canal stenosis.      Mri lumbar:(2015)   Multilevel, moderate to severe lumbar spondylosis is present, as detailed above:  -- severe neural foraminal narrowing on the left at L1-2, on the right at L3-4, bilaterally at L4-5 and on the right at L5-S1.  -- multilevel, moderately severe acquired spinal canal stenosis most pronounced at L4-5.  -- multilevel moderate to severe bilateral facet arthrosis.  -- postoperative changes of left L5 laminectomy and suspected laminotomy at L3.     CT abdomen (2015):visualized portion of the aorta is significant for mild calcification and plaque formation. The right and left kidneys each contain two punctate stones which measure approximately 0.2 cm. The right kidney contains an exophytic 1.4-cm hypodensity extending off the inferior pole which is incompletely characterized but likely represents a cyst. The left kidney contains a 1 cm hypodensity within the superior pole which is incompletely characterized but likely represents a cyst. The     Chest xray (2015): WNL   emg/ncs: (10/20/2015): predominantly sensory axonal polyneuropathy; mild bilateral carpal tunnel syndrome; chronic right c7-c8 radiculopathy may also be present    Bone scan (10/2015):.Degenerative changes in the glenohumeral and SI joints.    Assessment:      68 yo male with PMH of ALLAN, atrial fibrillation,  CTS, cervical radiculopathy, polyneuropathy, renal cysts   here for  For follow up of seronegative RA. Patient initially presented with shoulder and hip stiffness, decreased  strength of both hands,  Hand swelling/stiffness ,anorexia with weight loss.  He is on  MTX 8 pills a week and started on  Humira on April 3, 2016 and tolerating it well. He reports continued pain in hands and trouble making him a fist so will switch him from Humira to Enbrel.  Will continue to wean down the prednisone.  He has mild edema in both legs so I think he has some vascular insufficiency.        1) Seronegative RA:  - wean prednisone from 2  mg a day to  1.5 mg a day  -continue MTX 8 pills a week with folic acid  -continue Humira 40mg sub q every 14 day (started April 3, 2016)  --referral to Dr. Cedillo for CTS if needed in future  -labs before next appt        2.fibromyalgia a-controlled.  -continue cymbalta 30mg a day.    rtc in 12   weeks

## 2017-10-19 ENCOUNTER — TELEPHONE (OUTPATIENT)
Dept: PHARMACY | Facility: CLINIC | Age: 67
End: 2017-10-19

## 2017-10-20 ENCOUNTER — HOSPITAL ENCOUNTER (OUTPATIENT)
Dept: RADIOLOGY | Facility: HOSPITAL | Age: 67
Discharge: HOME | End: 2017-10-20
Attending: FAMILY MEDICINE
Payer: COMMERCIAL

## 2017-10-20 DIAGNOSIS — N28.1 RENAL CYST: ICD-10-CM

## 2017-10-20 PROCEDURE — 76770 US EXAM ABDO BACK WALL COMP: CPT | Mod: TC

## 2017-10-20 PROCEDURE — 76770 US EXAM ABDO BACK WALL COMP: CPT | Mod: 26,,, | Performed by: RADIOLOGY

## 2017-10-20 NOTE — TELEPHONE ENCOUNTER
DOCUMENTATION ONLY:  Prior authorization for Enbrel approved from 10/18/2017 to 04/17/2018    Case Id: [id number]    Co-pay: 0    Patient Assistance  IS NOT required

## 2017-10-31 ENCOUNTER — TELEPHONE (OUTPATIENT)
Dept: PHARMACY | Facility: CLINIC | Age: 67
End: 2017-10-31

## 2017-10-31 NOTE — TELEPHONE ENCOUNTER
Pt will finish remaining Humira dose and will inject first dose of Enbrel on 11/12.  [facility] is shipping medication on 11/6 and will follow up with pt on 11/20.  Pt's wife said pt will call with any questions about administering the Enbrel [employer].

## 2017-11-07 ENCOUNTER — PATIENT MESSAGE (OUTPATIENT)
Dept: FAMILY MEDICINE | Facility: CLINIC | Age: 67
End: 2017-11-07

## 2017-11-27 ENCOUNTER — TELEPHONE (OUTPATIENT)
Dept: PHARMACY | Facility: CLINIC | Age: 67
End: 2017-11-27

## 2017-11-28 ENCOUNTER — PATIENT MESSAGE (OUTPATIENT)
Dept: GASTROENTEROLOGY | Facility: CLINIC | Age: 67
End: 2017-11-28

## 2017-11-28 ENCOUNTER — TELEPHONE (OUTPATIENT)
Dept: GASTROENTEROLOGY | Facility: CLINIC | Age: 67
End: 2017-11-28

## 2017-11-28 NOTE — TELEPHONE ENCOUNTER
Referral was sent from Dr. Garcia to schedule an Colonoscopy, left an voicemail for patient to call the office back in regards to scheduling procedure.

## 2017-12-05 RX ORDER — FOLIC ACID 1 MG/1
TABLET ORAL
Qty: 90 TABLET | Refills: 10 | Status: SHIPPED | OUTPATIENT
Start: 2017-12-05 | End: 2018-08-14

## 2017-12-15 ENCOUNTER — PATIENT MESSAGE (OUTPATIENT)
Dept: GASTROENTEROLOGY | Facility: CLINIC | Age: 67
End: 2017-12-15

## 2017-12-18 ENCOUNTER — TELEPHONE (OUTPATIENT)
Dept: PHARMACY | Facility: CLINIC | Age: 67
End: 2017-12-18

## 2017-12-20 ENCOUNTER — TELEPHONE (OUTPATIENT)
Dept: GASTROENTEROLOGY | Facility: CLINIC | Age: 67
End: 2017-12-20

## 2017-12-20 DIAGNOSIS — Z80.0 FAMILY HISTORY OF COLON CANCER: ICD-10-CM

## 2017-12-20 DIAGNOSIS — Z12.11 SCREENING FOR COLON CANCER: Primary | ICD-10-CM

## 2017-12-20 NOTE — TELEPHONE ENCOUNTER
Referring Physician: Dr. Garcia     Date: 01/30/2018    Reason for Referral: Screening Colon/ Family history of colon cancer.     Family History of:   Colon polyp: unknown   Relationship/Age of Onset:     Colon cancer: Yes   Relationship/Age of Onset: Grandfather, age 75; Great-grandmother, age 70.     Patient with:   Hemoccults Done: No     Iron deficient: No     On Blood Thinner: Aspirin 325 mg daily (atrial fib)     Valvular heart disease/valve replacement: MVP     Anemia Present: No     On NSAID: Celebrex PRN     Lung disease: No     Kidney disease: Nephrolithiasis     Hx of polyps: Yes     Hx of colon cancer: No     Previous colon evalations:   When: At age 52, 57 at [facility] with Tanner Shaffer; 62 at [facility] with Charanjit   Where:   Pertinent symptoms: none       Review of patient's allergies indicates: none known     All questions was okayed by Dr. Scott prior to scheduling patient.

## 2018-01-08 ENCOUNTER — PATIENT MESSAGE (OUTPATIENT)
Dept: RHEUMATOLOGY | Facility: CLINIC | Age: 68
End: 2018-01-08

## 2018-01-15 ENCOUNTER — LAB VISIT (OUTPATIENT)
Dept: LAB | Facility: HOSPITAL | Age: 68
End: 2018-01-15
Attending: INTERNAL MEDICINE
Payer: COMMERCIAL

## 2018-01-15 DIAGNOSIS — M06.9 RHEUMATOID ARTHRITIS INVOLVING MULTIPLE JOINTS: ICD-10-CM

## 2018-01-15 LAB
ALBUMIN SERPL BCP-MCNC: 3.9 G/DL
ALP SERPL-CCNC: 83 U/L
ALT SERPL W/O P-5'-P-CCNC: 26 U/L
ANION GAP SERPL CALC-SCNC: 5 MMOL/L
AST SERPL-CCNC: 33 U/L
BASOPHILS # BLD AUTO: 0.04 K/UL
BASOPHILS NFR BLD: 0.5 %
BILIRUB SERPL-MCNC: 0.7 MG/DL
BUN SERPL-MCNC: 15 MG/DL
CALCIUM SERPL-MCNC: 9.7 MG/DL
CHLORIDE SERPL-SCNC: 107 MMOL/L
CO2 SERPL-SCNC: 29 MMOL/L
CREAT SERPL-MCNC: 0.9 MG/DL
CRP SERPL-MCNC: 2.4 MG/L
DIFFERENTIAL METHOD: ABNORMAL
EOSINOPHIL # BLD AUTO: 0.3 K/UL
EOSINOPHIL NFR BLD: 3.1 %
ERYTHROCYTE [DISTWIDTH] IN BLOOD BY AUTOMATED COUNT: 14.6 %
ERYTHROCYTE [SEDIMENTATION RATE] IN BLOOD BY WESTERGREN METHOD: 11 MM/HR
EST. GFR  (AFRICAN AMERICAN): >60 ML/MIN/1.73 M^2
EST. GFR  (NON AFRICAN AMERICAN): >60 ML/MIN/1.73 M^2
GLUCOSE SERPL-MCNC: 112 MG/DL
HCT VFR BLD AUTO: 40 %
HGB BLD-MCNC: 13.4 G/DL
LYMPHOCYTES # BLD AUTO: 3.5 K/UL
LYMPHOCYTES NFR BLD: 42.3 %
MCH RBC QN AUTO: 30.9 PG
MCHC RBC AUTO-ENTMCNC: 33.5 G/DL
MCV RBC AUTO: 92 FL
MONOCYTES # BLD AUTO: 0.6 K/UL
MONOCYTES NFR BLD: 7.7 %
NEUTROPHILS # BLD AUTO: 3.9 K/UL
NEUTROPHILS NFR BLD: 46.3 %
PLATELET # BLD AUTO: 201 K/UL
PMV BLD AUTO: 10.4 FL
POTASSIUM SERPL-SCNC: 4.4 MMOL/L
PROT SERPL-MCNC: 7.3 G/DL
RBC # BLD AUTO: 4.34 M/UL
SODIUM SERPL-SCNC: 141 MMOL/L
WBC # BLD AUTO: 8.33 K/UL

## 2018-01-15 PROCEDURE — 85025 COMPLETE CBC W/AUTO DIFF WBC: CPT

## 2018-01-15 PROCEDURE — 36415 COLL VENOUS BLD VENIPUNCTURE: CPT

## 2018-01-15 PROCEDURE — 86140 C-REACTIVE PROTEIN: CPT

## 2018-01-15 PROCEDURE — 85652 RBC SED RATE AUTOMATED: CPT

## 2018-01-15 PROCEDURE — 80053 COMPREHEN METABOLIC PANEL: CPT

## 2018-01-17 ENCOUNTER — PATIENT MESSAGE (OUTPATIENT)
Dept: RHEUMATOLOGY | Facility: CLINIC | Age: 68
End: 2018-01-17

## 2018-01-22 ENCOUNTER — PATIENT MESSAGE (OUTPATIENT)
Dept: RHEUMATOLOGY | Facility: CLINIC | Age: 68
End: 2018-01-22

## 2018-01-25 ENCOUNTER — TELEPHONE (OUTPATIENT)
Dept: PHARMACY | Facility: CLINIC | Age: 68
End: 2018-01-25

## 2018-01-30 ENCOUNTER — ANESTHESIA EVENT (OUTPATIENT)
Dept: ENDOSCOPY | Facility: HOSPITAL | Age: 68
End: 2018-01-30
Payer: COMMERCIAL

## 2018-01-30 ENCOUNTER — HOSPITAL ENCOUNTER (OUTPATIENT)
Facility: HOSPITAL | Age: 68
Discharge: HOME | End: 2018-01-30
Attending: INTERNAL MEDICINE | Admitting: INTERNAL MEDICINE
Payer: COMMERCIAL

## 2018-01-30 ENCOUNTER — ANESTHESIA (OUTPATIENT)
Dept: ENDOSCOPY | Facility: HOSPITAL | Age: 68
End: 2018-01-30
Payer: COMMERCIAL

## 2018-01-30 ENCOUNTER — SURGERY (OUTPATIENT)
Age: 68
End: 2018-01-30

## 2018-01-30 VITALS
OXYGEN SATURATION: 97 % | HEART RATE: 58 BPM | RESPIRATION RATE: 18 BRPM | SYSTOLIC BLOOD PRESSURE: 118 MMHG | DIASTOLIC BLOOD PRESSURE: 80 MMHG | TEMPERATURE: 99 F | BODY MASS INDEX: 31.56 KG/M2 | HEIGHT: 72 IN | WEIGHT: 233 LBS

## 2018-01-30 DIAGNOSIS — Z12.11 SCREENING FOR MALIGNANT NEOPLASM OF COLON: ICD-10-CM

## 2018-01-30 PROCEDURE — 37000009 HC ANESTHESIA EA ADD 15 MINS: Performed by: INTERNAL MEDICINE

## 2018-01-30 PROCEDURE — 37000008 HC ANESTHESIA 1ST 15 MINUTES: Performed by: INTERNAL MEDICINE

## 2018-01-30 PROCEDURE — 25000003 PHARM REV CODE 250: Performed by: INTERNAL MEDICINE

## 2018-01-30 PROCEDURE — G0105 COLORECTAL SCRN; HI RISK IND: HCPCS | Mod: ,,, | Performed by: INTERNAL MEDICINE

## 2018-01-30 PROCEDURE — 63600175 PHARM REV CODE 636 W HCPCS: Performed by: NURSE ANESTHETIST, CERTIFIED REGISTERED

## 2018-01-30 PROCEDURE — G0105 COLORECTAL SCRN; HI RISK IND: HCPCS | Performed by: INTERNAL MEDICINE

## 2018-01-30 RX ORDER — PROPOFOL 10 MG/ML
VIAL (ML) INTRAVENOUS CONTINUOUS PRN
Status: DISCONTINUED | OUTPATIENT
Start: 2018-01-30 | End: 2018-01-30

## 2018-01-30 RX ORDER — PROPOFOL 10 MG/ML
VIAL (ML) INTRAVENOUS
Status: DISCONTINUED | OUTPATIENT
Start: 2018-01-30 | End: 2018-01-30

## 2018-01-30 RX ORDER — LIDOCAINE HCL/PF 100 MG/5ML
SYRINGE (ML) INTRAVENOUS
Status: DISCONTINUED | OUTPATIENT
Start: 2018-01-30 | End: 2018-01-30

## 2018-01-30 RX ORDER — SODIUM CHLORIDE 9 MG/ML
INJECTION, SOLUTION INTRAVENOUS CONTINUOUS
Status: DISCONTINUED | OUTPATIENT
Start: 2018-01-30 | End: 2018-01-30 | Stop reason: HOSPADM

## 2018-01-30 RX ADMIN — PROPOFOL 150 MCG/KG/MIN: 10 INJECTION, EMULSION INTRAVENOUS at 08:01

## 2018-01-30 RX ADMIN — SODIUM CHLORIDE: 0.9 INJECTION, SOLUTION INTRAVENOUS at 07:01

## 2018-01-30 RX ADMIN — PROPOFOL 100 MG: 10 INJECTION, EMULSION INTRAVENOUS at 08:01

## 2018-01-30 RX ADMIN — LIDOCAINE HYDROCHLORIDE 50 MG: 20 INJECTION, SOLUTION INTRAVENOUS at 08:01

## 2018-01-30 NOTE — DISCHARGE INSTRUCTIONS
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  - The following day: return to full activity including work.  - For 3 days: No heavy lifting, straining or running.  - Diet: Eat and drink normally unless instructed otherwise.    Treatment for Common Side Effects:  - Mild abdominal pain and bloating or excessive gas: rest, eat lightly and use a heating pad.     Symptoms to watch for and report to your physician:  1. Severe abdominal pain.  2. Fever within 24 hours after a procedure.  3. A large amount of rectal bleeding. (A small amount of blood from the rectum is not serious, especially if hemorrhoids are present.)  4. Because air was put into your colon during the procedure, expelling large amount of air from your rectum is normal.  5. You may not have a bowel movement for 1-3 days because of the colonoscopy prep. This is normal.  6. Go directly to the emergency room if you notice any of the following:     Chills and/or fever over 101   Persistent vomiting   Severe abdominal pain, other than gas cramps   Severe chest pain   Black, tarry stools   Any bleeding - exceeding one tablespoon    If you have any questions or problems, please call your Physician:    Nadia Scott MD      If a complication or emergency situation arises and you are unable to reach your Physician - GO TO THE EMERGENCY ROOM.

## 2018-01-30 NOTE — ANESTHESIA POSTPROCEDURE EVALUATION
Anesthesia Post Evaluation    Patient: Franko Buchanan MD    Procedure(s) Performed: Procedure(s) (LRB):  COLONOSCOPY (N/A)    Final Anesthesia Type: MAC  Patient location during evaluation: GI PACU  Patient participation: Yes- Able to Participate  Level of consciousness: awake and alert and awake  Post-procedure vital signs: reviewed and stable  Pain management: adequate  Airway patency: patent  PONV status at discharge: No PONV  Anesthetic complications: no      Cardiovascular status: blood pressure returned to baseline  Respiratory status: unassisted, spontaneous ventilation and room air  Hydration status: euvolemic  Follow-up not needed.        Visit Vitals  BP (!) 143/88   Pulse 70   Temp 37.2 °C (99 °F) (Oral)   Resp 16   Ht 6' (1.829 m)   Wt 105.7 kg (233 lb)   SpO2 98%   BMI 31.60 kg/m²       Pain/Conor Score: Pain Assessment Performed: Yes (1/30/2018  7:43 AM)  Presence of Pain: denies (1/30/2018  7:43 AM)

## 2018-01-30 NOTE — TRANSFER OF CARE
Anesthesia Transfer of Care Note    Patient: Franko Buchanan MD    Procedure(s) Performed: Procedure(s) (LRB):  COLONOSCOPY (N/A)    Patient location: GI    Anesthesia Type: MAC    Transport from OR: Transported from OR on room air with adequate spontaneous ventilation    Post pain: adequate analgesia    Post assessment: no apparent anesthetic complications    Post vital signs: stable    Level of consciousness: awake, alert and oriented    Nausea/Vomiting: no nausea/vomiting    Complications: none    Transfer of care protocol was followed      Last vitals:   Visit Vitals  BP (!) 143/88   Pulse 70   Temp 37.2 °C (99 °F) (Oral)   Resp 16   Ht 6' (1.829 m)   Wt 105.7 kg (233 lb)   SpO2 98%   BMI 31.60 kg/m²

## 2018-01-30 NOTE — H&P
[facility]  Gastroenterology  H&P    Patient Name: Franko Buchanan MD  MRN: [id number]  Admission Date: 1/30/2018  Code Status: No Order    Attending Provider: Nadia Scott MD   Primary Care Physician: Loyd Garcia MD  Principal Problem:<principal problem not specified>    Subjective:     History of Present Illness: Screening colonoscopy    Past Medical History:   Diagnosis Date    Atrial fibrillation     1st diagnosed in med school    Basal cell carcinoma     right temporal scalp    Cataract     Colon polyps     Retinal hole     Right eye    Seronegative rheumatoid arthritis     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    Sleep apnea        Past Surgical History:   Procedure Laterality Date    BACK SURGERY      1990's and 2009; last by Naldo    Focal Laser       Right eye    HERNIA REPAIR Right     inguinal    SKIN CANCER EXCISION      SPINE SURGERY      l3-4/ l5-s1  (x 2)    TONSILLECTOMY         Review of patient's allergies indicates:  No Known Allergies  Family History     Problem Relation (Age of Onset)    Alzheimer's disease Maternal Grandmother    Cancer Father, Sister, Paternal Grandfather    Cataracts Maternal Grandmother    Colon polyps Father    Glaucoma Mother    Heart failure Maternal Grandfather    No Known Problems Sister, Maternal Aunt, Maternal Uncle, Paternal Aunt, Paternal Uncle, Paternal Grandmother    Thyroid disease Sister        Social History Main Topics    Smoking status: Never Smoker    Smokeless tobacco: Never Used    Alcohol use 3.0 oz/week     6 Standard drinks or equivalent per week      Comment: 3 X WEEK     Drug use: No    Sexual activity: Not on file     Review of Systems  Objective:     Vital Signs (Most Recent):    Vital Signs (24h Range):           There is no height or weight on file to calculate BMI.    No intake or output data in the 24 hours ending 01/30/18 0740    Lines/Drains/Airways          No matching active lines, drains, or  airways          Physical Exam   Constitutional: He appears well-developed and well-nourished. No distress.   HENT:   Head: Normocephalic and atraumatic.   Eyes: Conjunctivae are normal. No scleral icterus.   Cardiovascular: Normal rate, regular rhythm and normal heart sounds.  Exam reveals no friction rub.    Pulmonary/Chest: Effort normal. No respiratory distress.   Abdominal: Soft. Bowel sounds are normal. He exhibits no distension. There is no tenderness.   Skin: Skin is dry.   Nursing note and vitals reviewed.          Assessment/Plan:     Active Diagnoses:    Diagnosis Date Noted POA    Screening for malignant neoplasm of colon [Z12.11] 01/30/2018 Not Applicable      Problems Resolved During this Admission:    Diagnosis Date Noted Date Resolved POA       Plan  1. Colonoscopy, risks/benefits explained in detail    Nadia Scott MD  Gastroenterology  [facility]

## 2018-02-14 ENCOUNTER — OFFICE VISIT (OUTPATIENT)
Dept: RHEUMATOLOGY | Facility: CLINIC | Age: 68
End: 2018-02-14
Payer: COMMERCIAL

## 2018-02-14 VITALS
WEIGHT: 240.31 LBS | SYSTOLIC BLOOD PRESSURE: 132 MMHG | HEART RATE: 61 BPM | HEIGHT: 73 IN | DIASTOLIC BLOOD PRESSURE: 79 MMHG | BODY MASS INDEX: 31.85 KG/M2 | RESPIRATION RATE: 18 BRPM

## 2018-02-14 DIAGNOSIS — G56.03 BILATERAL CARPAL TUNNEL SYNDROME: ICD-10-CM

## 2018-02-14 DIAGNOSIS — R21 RASH: ICD-10-CM

## 2018-02-14 DIAGNOSIS — Z79.631 ENCOUNTER FOR METHOTREXATE MONITORING: ICD-10-CM

## 2018-02-14 DIAGNOSIS — Z51.81 ENCOUNTER FOR METHOTREXATE MONITORING: ICD-10-CM

## 2018-02-14 DIAGNOSIS — M06.9 RHEUMATOID ARTHRITIS INVOLVING MULTIPLE JOINTS: Primary | ICD-10-CM

## 2018-02-14 PROCEDURE — 99999 PR PBB SHADOW E&M-EST. PATIENT-LVL III: CPT | Mod: PBBFAC,,, | Performed by: INTERNAL MEDICINE

## 2018-02-14 PROCEDURE — 3008F BODY MASS INDEX DOCD: CPT | Mod: S$GLB,,, | Performed by: INTERNAL MEDICINE

## 2018-02-14 PROCEDURE — 99214 OFFICE O/P EST MOD 30 MIN: CPT | Mod: S$GLB,,, | Performed by: INTERNAL MEDICINE

## 2018-02-14 RX ORDER — METHOTREXATE 2.5 MG/1
TABLET ORAL
Qty: 96 TABLET | Refills: 0 | Status: SHIPPED | OUTPATIENT
Start: 2018-02-14 | End: 2018-08-14

## 2018-02-14 NOTE — PROGRESS NOTES
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    Chief Complaint: Joint Pain     HPI 64 yo male with PMH of ALLAN, atrial fibrillation on 325 mg qday, l3-l4 laminectomy around 2000, 2009( L5-51 dissecotmy), cervical epidural, right inguinal hernia, and kidney stones.  here for evaluation of shoulder and hip stiffness.  He has trouble with rising and sitting from chair.  He also has trouble putting on his jacket. He feels fatigued.  Symptoms have been on going for 2 months.He also reports trouble with weakness in  in both hands and shooting pain  from his neck down to his arms. The pain wakes him up at night. Reports 20 pounds weight loss.   Feels sometimes that legs are weak.  He has appt with neurosurgeon tomorrow.  No headaches, but maybe mild pressure in temples ( he reports reading about [facility]).  Denies any swelling.  Reports that he had some stiffness in morning that has improved in morning since starting prednisone.    Interval history:   He is here for follow up of  Seronegative RA.  He stopped enbrel 3 weeks ago. He is taking MTX 8 pills a week. He reports stiffness all day long. He reports  He also reports having swelling.  He is taking prednisone 2mg a day.  He is on MTX 8 pills a week and doing Humira.  He is taking cymbalta at bedtime.  He uses his cpap machine all the time.        Past Medical History   Diagnosis Date    Sleep apnea     Atrial fibrillation     Colon polyps     Retinal hole      Right eye    Cataract        Review of Systems   Constitutional: Positive for fatigue. Negative for fever, chills and appetite change.   HENT: Negative for hearing loss, mouth sores, rhinorrhea, sinus pressure and trouble swallowing.    Eyes: Negative for photophobia, pain, discharge, itching and visual disturbance.   Respiratory: Negative for cough, chest tightness, wheezing and stridor.    Cardiovascular: Negative for chest pain and palpitations.   Gastrointestinal: Negative for  blood in stool and abdominal distention.   Endocrine: Negative for cold intolerance and heat intolerance.   Genitourinary: Negative for dysuria, hematuria and flank pain.   Musculoskeletal: Positive for myalgias, arthralgias and neck pain. Negative for back pain, joint swelling, gait problem and neck stiffness.   Skin: Negative for color change, pallor and rash.   Neurological: Negative for dizziness, light-headedness, numbness and headaches.   Hematological: Negative for adenopathy. Does not bruise/bleed easily.   Psychiatric/Behavioral: Negative for decreased concentration and agitation. The patient is not nervous/anxious.            Objective:     Physical Exam   Constitutional: He is oriented to person, place, and time. No distress.   HENT:   Head: Normocephalic and atraumatic.   Eyes: Conjunctivae are normal. Pupils are equal, round, and reactive to light.   Neck: No tracheal deviation present. No thyromegaly present.   Cardiovascular: Normal rate, regular rhythm and normal heart sounds.  Exam reveals no gallop and no friction rub.    No murmur heard.  Pulmonary/Chest: No stridor. No respiratory distress. He has no wheezes. He has no rales. He exhibits no tenderness.   Abdominal: Soft. He exhibits no distension. There is no tenderness. There is no rebound.   Neurological: He is alert and oriented to person, place, and time.   Decreased  strength in both hands   Skin: Skin is warm. He is not diaphoretic. ; bulls eye appearing lesion in left groin; erythematous lacy lesion on left foot and small punctate lesions  In left sole  Musculoskeletal:    Shoulders-abduction to 160 degrees bilaterally (worse than last visit)  Trouble rising from chair (resolved)  Motor- 5/5 strength in upper and lower extremity proximally but has decreased  strength (improved since last visit)  Hands- able to make almost full fist now bilaterally  Wrists- no synovitis         8/2015  Ina-+   subserologies-negative  Esr-  40<51<24  crp- 5<35  Rf,ccp-negative  cpk- 115<92  < 676 (2013)  Aldolase-wnl  spep-wnl  Irma-negative       Thoracic xray (2015): There are anterior flowing osteophytes in the midthoracic spine.    Xrays: (shoulder) -9/2015:  Small high density foci along the margin of the humeral head bilaterally suggestive for calcific tendinitis more prominent on the right. There is   degenerative change of the AC joints also more prominent on the right with hypertrophic spurring.    Mri (cervical):  (2015)Degenerative changes of the cervical spine, most prominent at C5-6 causing mild central spinal canal stenosis.      Mri lumbar:(2015)   Multilevel, moderate to severe lumbar spondylosis is present, as detailed above:  -- severe neural foraminal narrowing on the left at L1-2, on the right at L3-4, bilaterally at L4-5 and on the right at L5-S1.  -- multilevel, moderately severe acquired spinal canal stenosis most pronounced at L4-5.  -- multilevel moderate to severe bilateral facet arthrosis.  -- postoperative changes of left L5 laminectomy and suspected laminotomy at L3.     CT abdomen (2015):visualized portion of the aorta is significant for mild calcification and plaque formation. The right and left kidneys each contain two punctate stones which measure approximately 0.2 cm. The right kidney contains an exophytic 1.4-cm hypodensity extending off the inferior pole which is incompletely characterized but likely represents a cyst. The left kidney contains a 1 cm hypodensity within the superior pole which is incompletely characterized but likely represents a cyst. The     Chest xray (2015): WNL   emg/ncs: (10/20/2015): predominantly sensory axonal polyneuropathy; mild bilateral carpal tunnel syndrome; chronic right c7-c8 radiculopathy may also be present    Bone scan (10/2015):.Degenerative changes in the glenohumeral and SI joints.    Assessment:      66 yo male with PMH of ALLAN, atrial fibrillation, CTS, cervical radiculopathy,  polyneuropathy, renal cysts   here for  For follow up of seronegative RA. Patient initially presented with shoulder and hip stiffness, decreased  strength of both hands,  Hand swelling/stiffness ,anorexia with weight loss.  Did well initially on Humira but then it stopped working so switched him to Enbrel. He took it for 2 months and reports he did not notice major difference.  Given lack of improvement, will hold of enbrel. Will set him up with hand clinic for evaluation of cts.      1) Seronegative RA:  - -continue MTX 8 pills a week with folic acid  -labs before next appt  -hand clinic referral for evaluation of cts      2.fibromyalgia a-controlled.  -continue cymbalta 30mg a day.    3. Rash: he has lower extremity rash that appears like vascular insuffiency.  Follow up with dermatology  rtc in 12   weeks

## 2018-02-16 RX ORDER — METHOTREXATE 2.5 MG/1
TABLET ORAL
Qty: 96 TABLET | Refills: 0 | Status: SHIPPED | OUTPATIENT
Start: 2018-02-16 | End: 2018-08-14

## 2018-03-07 ENCOUNTER — OFFICE VISIT (OUTPATIENT)
Dept: DERMATOLOGY | Facility: CLINIC | Age: 68
End: 2018-03-07
Payer: COMMERCIAL

## 2018-03-07 DIAGNOSIS — L57.0 AK (ACTINIC KERATOSIS): ICD-10-CM

## 2018-03-07 DIAGNOSIS — L81.7 PIGMENTED PURPURIC DERMATOSIS: Primary | ICD-10-CM

## 2018-03-07 DIAGNOSIS — L90.5 SCAR: ICD-10-CM

## 2018-03-07 DIAGNOSIS — L81.4 LENTIGO: ICD-10-CM

## 2018-03-07 DIAGNOSIS — Z85.828 PERSONAL HISTORY OF SKIN CANCER: ICD-10-CM

## 2018-03-07 DIAGNOSIS — D18.00 ANGIOMA: ICD-10-CM

## 2018-03-07 DIAGNOSIS — D22.9 NEVUS: ICD-10-CM

## 2018-03-07 PROCEDURE — 17000 DESTRUCT PREMALG LESION: CPT | Mod: S$GLB,,, | Performed by: DERMATOLOGY

## 2018-03-07 PROCEDURE — 99999 PR PBB SHADOW E&M-EST. PATIENT-LVL III: CPT | Mod: PBBFAC,,, | Performed by: DERMATOLOGY

## 2018-03-07 PROCEDURE — 99214 OFFICE O/P EST MOD 30 MIN: CPT | Mod: 25,S$GLB,, | Performed by: DERMATOLOGY

## 2018-03-07 PROCEDURE — 17003 DESTRUCT PREMALG LES 2-14: CPT | Mod: S$GLB,,, | Performed by: DERMATOLOGY

## 2018-03-07 RX ORDER — TRIAMCINOLONE ACETONIDE 1 MG/G
CREAM TOPICAL
Qty: 60 G | Refills: 3 | Status: SHIPPED | OUTPATIENT
Start: 2018-03-07 | End: 2019-05-27 | Stop reason: SDUPTHER

## 2018-03-07 NOTE — PROGRESS NOTES
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 Pt c/o red spots on both lower legs x 3 moths. Tx with luzu cream and cortisone cream. Tx did not help. Becomes inflamed then remits.  Not wearing CYNTHIA hose regularly. Still on MTX, stopped Humira and Enbrel bc friend got lymphoma on Enbrel.    This is a high risk patient here to check for the development of new lesions.          Lesion         Review of Systems   Skin: Positive for itching, rash and activity-related sunscreen use. Negative for daily sunscreen use and recent sunburn.   Hematologic/Lymphatic: Does not bruise/bleed easily.        Objective:    Physical Exam   Constitutional: He appears well-developed and well-nourished. No distress.   Neurological: He is alert and oriented to person, place, and time. He is not disoriented.   Psychiatric: He has a normal mood and affect.   Skin:   Areas Examined (abnormalities noted in diagram):   Scalp / Hair Palpated and Inspected  Head / Face Inspection Performed  Neck Inspection Performed  Chest / Axilla Inspection Performed  Abdomen Inspection Performed  Genitals / Buttocks / Groin Inspection Performed  Back Inspection Performed  RUE Inspected  LUE Inspection Performed  RLE Inspected  Nails and Digits Inspection Performed                           Diagram Legend     Erythematous scaling macule/papule c/w actinic keratosis       Vascular papule c/w angioma      Pigmented verrucoid papule/plaque c/w seborrheic keratosis      Yellow umbilicated papule c/w sebaceous hyperplasia      Irregularly shaped tan macule c/w lentigo     1-2 mm smooth white papules consistent with Milia      Movable subcutaneous cyst with punctum c/w epidermal inclusion cyst      Subcutaneous movable cyst c/w pilar cyst      Firm pink to brown papule c/w dermatofibroma      Pedunculated fleshy papule(s) c/w skin tag(s)       Evenly pigmented macule c/w junctional nevus     Mildly variegated pigmented, slightly irregular-bordered macule c/w mildly atypical nevus      Flesh colored to evenly pigmented papule c/w intradermal nevus       Pink pearly papule/plaque c/w basal cell carcinoma      Erythematous hyperkeratotic cursted plaque c/w SCC      Surgical scar with no sign of skin cancer recurrence      Open and closed comedones      Inflammatory papules and pustules      Verrucoid papule consistent consistent with wart     Erythematous eczematous patches and plaques     Dystrophic onycholytic nail with subungual debris c/w onychomycosis     Umbilicated papule    Erythematous-base heme-crusted tan verrucoid plaque consistent with inflamed seborrheic keratosis     Erythematous Silvery Scaling Plaque c/w Psoriasis     See annotation      Assessment / Plan:        Pigmented purpuric dermatosis  -     triamcinolone acetonide 0.1% (KENALOG) 0.1 % cream; AAA bid  Dispense: 60 g; Refill: 3  CYNTHIA hose     AK (actinic keratosis)  Cryosurgery Procedure Note    Verbal consent from the patient is obtained and the patient is aware of the precancerous quality and need for treatment of these lesions. Liquid nitrogen cryosurgery is applied to the 2 actinic keratoses, as detailed in the physical exam, to produce a freeze injury. The patient is aware that blisters may form and is instructed on wound care with gentle cleansing and use of vaseline ointment to keep moist until healed. The patient is supplied a handout on cryosurgery and is instructed to call if lesions do not completely resolve.    Lentigo  This is a benign hyperpigmented sun induced lesion. Daily sun protection will reduce the number of new lesions. Treatment of these benign lesions are considered cosmetic.  The nature of sun-induced photo-aging and skin cancers is discussed.  Sun avoidance, protective clothing, and the use of 30-SPF sunscreens is advised. Observe closely for skin  damage/changes, and call if such occurs.    Nevus  Discussed ABCDE's of nevi.  Monitor for new mole or moles that are becoming bigger, darker, irritated, or developing irregular borders. Brochure provided.    Angioma  These are benign vascular lesions that are inherited.  Treatment is not necessary.    Personal history of skin cancer  Scar  Pt with history of non melanoma skin cancer  Total body skin examination performed today including at least 12 points as noted in physical examination. No suspicious lesions noted.             Follow-up in about 1 year (around 3/7/2019). recheck nevus on right upper arm and left upper back

## 2018-03-13 ENCOUNTER — OFFICE VISIT (OUTPATIENT)
Dept: ORTHOPEDICS | Facility: CLINIC | Age: 68
End: 2018-03-13
Payer: COMMERCIAL

## 2018-03-13 ENCOUNTER — HOSPITAL ENCOUNTER (OUTPATIENT)
Dept: RADIOLOGY | Facility: HOSPITAL | Age: 68
Discharge: HOME | End: 2018-03-13
Attending: ORTHOPAEDIC SURGERY
Payer: COMMERCIAL

## 2018-03-13 VITALS — BODY MASS INDEX: 31.81 KG/M2 | HEIGHT: 73 IN | WEIGHT: 240 LBS

## 2018-03-13 DIAGNOSIS — M79.641 BILATERAL HAND PAIN: ICD-10-CM

## 2018-03-13 DIAGNOSIS — M54.12 CERVICAL RADICULOPATHY AT C7: Primary | ICD-10-CM

## 2018-03-13 DIAGNOSIS — M79.642 BILATERAL HAND PAIN: ICD-10-CM

## 2018-03-13 DIAGNOSIS — G56.03 CARPAL TUNNEL SYNDROME ON BOTH SIDES: ICD-10-CM

## 2018-03-13 DIAGNOSIS — M65.341 TRIGGER RING FINGER OF RIGHT HAND: ICD-10-CM

## 2018-03-13 PROCEDURE — 99204 OFFICE O/P NEW MOD 45 MIN: CPT | Mod: 25,S$GLB,, | Performed by: ORTHOPAEDIC SURGERY

## 2018-03-13 PROCEDURE — 73130 X-RAY EXAM OF HAND: CPT | Mod: 50,TC,FY

## 2018-03-13 PROCEDURE — 99999 PR PBB SHADOW E&M-EST. PATIENT-LVL II: CPT | Mod: PBBFAC,,, | Performed by: ORTHOPAEDIC SURGERY

## 2018-03-13 PROCEDURE — 73130 X-RAY EXAM OF HAND: CPT | Mod: 26,50,, | Performed by: RADIOLOGY

## 2018-03-13 PROCEDURE — 20550 NJX 1 TENDON SHEATH/LIGAMENT: CPT | Mod: F8,S$GLB,, | Performed by: ORTHOPAEDIC SURGERY

## 2018-03-13 RX ORDER — TRIAMCINOLONE ACETONIDE 40 MG/ML
40 INJECTION, SUSPENSION INTRA-ARTICULAR; INTRAMUSCULAR
Status: COMPLETED | OUTPATIENT
Start: 2018-03-13 | End: 2018-03-13

## 2018-03-13 RX ADMIN — TRIAMCINOLONE ACETONIDE 40 MG: 40 INJECTION, SUSPENSION INTRA-ARTICULAR; INTRAMUSCULAR at 09:03

## 2018-03-13 NOTE — LETTER
March 13, 2018        Ines Strong MD  [address]  [address]  [place] [address]             [facility]  [address]  [place] [address]  Phone: [phone]   Patient: Franko Buchanan MD   MR Number: [id number]   YOB: 1950   Date of Visit: 3/13/2018       Dear Dr. Strong:    Thank you for referring Franko Buchanan to me for evaluation. Below are the relevant portions of my assessment and plan of care.            If you have questions, please do not hesitate to call me. I look forward to following Franko along with you.    Sincerely,      Justino Costello Jr., MD           CC  No Recipients

## 2018-03-13 NOTE — PROGRESS NOTES
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  He has had carpal tunnel symptoms for the past several   years, had a nerve test a few years ago, which showed bilateral carpal tunnel   syndrome, but he also had some cervical radiculopathy at C7 on the right.  He   has had neck problems in the past.  He is also starting to get some triggering   in the fingers of both hands involving the ring fingers, a little bit worse on   the right compared to the left.  Symptoms seem to be worse in the morning and   get better during the day.    He does have some numbness from time to time and feels like he is losing a   little bit of dexterity, particularly in his index finger of the right hand.    PAST MEDICAL HISTORY:  Significant for atrial fibrillation, cataracts, colon   polyps, sleep apnea and rheumatoid arthritis.    PAST SURGICAL HISTORY:  Includes hernia repair, tonsillectomy, spinal surgery,   back surgery, colonoscopy.    FAMILY HISTORY:  Positive for cancer, heart failure and Alzheimer's as well as   glaucoma.    SOCIAL HISTORY:  The patient does not smoke.  Drinks about six drinks per week.    REVIEW OF SYSTEMS:  Negative fever, chills, rashes.    CURRENT MEDICATIONS:  Reviewed on chart.    ALLERGIES:  None.    PHYSICAL EXAMINATION:  GENERAL:  Well-developed, well-nourished male in no acute distress, alert and   oriented x3.  MUSCULOSKELETAL:  Examination of upper extremities significant for the hands,   demonstrating tenderness and triggering of the ring finger bilaterally.  There   is also some mild swelling volar compartment on the right with a positive Tinel   sign on the right, negative on the left.  Sensation intact in all digits.     strength slightly decreased on the right as well.    Nerve test as noted above, demonstrating bilateral carpal tunnel syndrome with   superimposed C7 radiculopathy on the right.    IMPRESSION:  1.  Triggering bilateral  ring fingers.  2.  Bilateral carpal tunnel syndrome.  3.  Double crush C7 radiculopathy on the right.    PLAN:  I explained the nature of these problems to the patient.  In a case like   this, we really do not know how much is coming from the carpal tunnel versus C7   radiculopathy, but I think it would be worth considering surgery to release the   carpal tunnel and then see how he does afterwards, especially since he is not   interested in any kind of cervical surgery.    The patient is in agreement.  Tentatively, he will set up surgery for June for   right carpal tunnel release and trigger release, right ring finger.  The risks   and benefits of surgery explained to the patient, he understands.    Additionally, since his surgery will be ways off, he would like to have an   injection performed today for the right ring finger.  After pause for timeout,   he identified the right ring finger injected the flexor tendon sheath, right   ring finger with combination of Kenalog 20 mg, 0.5 mL Xylocaine, sterile   technique, which he tolerated well without complication.  Follow up for the   above surgery.      SAE  dd: 03/13/2018 09:50:28 (CDT)  td: 03/14/2018 06:05:16 (CDT)  Doc ID   #[id number]  Job ID #[id number]    CC:

## 2018-04-09 ENCOUNTER — TELEPHONE (OUTPATIENT)
Dept: ELECTROPHYSIOLOGY | Facility: CLINIC | Age: 68
End: 2018-04-09

## 2018-04-09 RX ORDER — FLECAINIDE ACETATE 150 MG/1
300 TABLET ORAL DAILY PRN
Qty: 60 TABLET | Refills: 6 | Status: SHIPPED | OUTPATIENT
Start: 2018-04-09 | End: 2018-09-26 | Stop reason: SDUPTHER

## 2018-04-27 ENCOUNTER — TELEPHONE (OUTPATIENT)
Dept: ORTHOPEDICS | Facility: CLINIC | Age: 68
End: 2018-04-27

## 2018-04-27 NOTE — TELEPHONE ENCOUNTER
----- Message from Meeta So sent at 4/27/2018  9:51 AM CDT -----  Contact: [phone]/wife/Sultana  Patient is requesting a call back regarding canceling his surgery. Please advise.

## 2018-05-16 ENCOUNTER — LAB VISIT (OUTPATIENT)
Dept: LAB | Facility: HOSPITAL | Age: 68
End: 2018-05-16
Attending: INTERNAL MEDICINE
Payer: COMMERCIAL

## 2018-05-16 DIAGNOSIS — M06.9 RHEUMATOID ARTHRITIS INVOLVING MULTIPLE JOINTS: ICD-10-CM

## 2018-05-16 LAB
ALBUMIN SERPL BCP-MCNC: 4 G/DL
ALP SERPL-CCNC: 94 U/L
ALT SERPL W/O P-5'-P-CCNC: 22 U/L
ANION GAP SERPL CALC-SCNC: 5 MMOL/L
AST SERPL-CCNC: 26 U/L
BASOPHILS # BLD AUTO: 0.03 K/UL
BASOPHILS NFR BLD: 0.4 %
BILIRUB SERPL-MCNC: 0.7 MG/DL
BUN SERPL-MCNC: 12 MG/DL
CALCIUM SERPL-MCNC: 9.7 MG/DL
CHLORIDE SERPL-SCNC: 107 MMOL/L
CO2 SERPL-SCNC: 31 MMOL/L
CREAT SERPL-MCNC: 0.9 MG/DL
CRP SERPL-MCNC: 4.7 MG/L
DIFFERENTIAL METHOD: ABNORMAL
EOSINOPHIL # BLD AUTO: 0.3 K/UL
EOSINOPHIL NFR BLD: 3.4 %
ERYTHROCYTE [DISTWIDTH] IN BLOOD BY AUTOMATED COUNT: 13.5 %
ERYTHROCYTE [SEDIMENTATION RATE] IN BLOOD BY WESTERGREN METHOD: 18 MM/HR
EST. GFR  (AFRICAN AMERICAN): >60 ML/MIN/1.73 M^2
EST. GFR  (NON AFRICAN AMERICAN): >60 ML/MIN/1.73 M^2
GLUCOSE SERPL-MCNC: 112 MG/DL
HCT VFR BLD AUTO: 39.3 %
HGB BLD-MCNC: 13.2 G/DL
LYMPHOCYTES # BLD AUTO: 3.1 K/UL
LYMPHOCYTES NFR BLD: 39.7 %
MCH RBC QN AUTO: 30.3 PG
MCHC RBC AUTO-ENTMCNC: 33.6 G/DL
MCV RBC AUTO: 90 FL
MONOCYTES # BLD AUTO: 0.7 K/UL
MONOCYTES NFR BLD: 9.3 %
NEUTROPHILS # BLD AUTO: 3.7 K/UL
NEUTROPHILS NFR BLD: 47.2 %
PLATELET # BLD AUTO: 215 K/UL
PMV BLD AUTO: 10.1 FL
POTASSIUM SERPL-SCNC: 3.9 MMOL/L
PROT SERPL-MCNC: 7.4 G/DL
RBC # BLD AUTO: 4.35 M/UL
SODIUM SERPL-SCNC: 143 MMOL/L
WBC # BLD AUTO: 7.85 K/UL

## 2018-05-16 PROCEDURE — 86140 C-REACTIVE PROTEIN: CPT

## 2018-05-16 PROCEDURE — 80053 COMPREHEN METABOLIC PANEL: CPT

## 2018-05-16 PROCEDURE — 36415 COLL VENOUS BLD VENIPUNCTURE: CPT

## 2018-05-16 PROCEDURE — 85652 RBC SED RATE AUTOMATED: CPT

## 2018-05-16 PROCEDURE — 85025 COMPLETE CBC W/AUTO DIFF WBC: CPT

## 2018-05-22 ENCOUNTER — OFFICE VISIT (OUTPATIENT)
Dept: SPORTS MEDICINE | Facility: CLINIC | Age: 68
End: 2018-05-22
Payer: COMMERCIAL

## 2018-05-22 ENCOUNTER — HOSPITAL ENCOUNTER (OUTPATIENT)
Dept: RADIOLOGY | Facility: HOSPITAL | Age: 68
Discharge: HOME | End: 2018-05-22
Attending: ORTHOPAEDIC SURGERY
Payer: COMMERCIAL

## 2018-05-22 VITALS
HEART RATE: 76 BPM | DIASTOLIC BLOOD PRESSURE: 74 MMHG | HEIGHT: 78 IN | WEIGHT: 233 LBS | BODY MASS INDEX: 26.96 KG/M2 | SYSTOLIC BLOOD PRESSURE: 132 MMHG

## 2018-05-22 DIAGNOSIS — M25.511 RIGHT SHOULDER PAIN, UNSPECIFIED CHRONICITY: ICD-10-CM

## 2018-05-22 DIAGNOSIS — M25.511 RIGHT SHOULDER PAIN, UNSPECIFIED CHRONICITY: Primary | ICD-10-CM

## 2018-05-22 PROCEDURE — 99999 PR PBB SHADOW E&M-EST. PATIENT-LVL III: CPT | Mod: PBBFAC,,, | Performed by: ORTHOPAEDIC SURGERY

## 2018-05-22 PROCEDURE — 99214 OFFICE O/P EST MOD 30 MIN: CPT | Mod: S$GLB,,, | Performed by: ORTHOPAEDIC SURGERY

## 2018-05-22 PROCEDURE — 73030 X-RAY EXAM OF SHOULDER: CPT | Mod: 26,RT,, | Performed by: RADIOLOGY

## 2018-05-22 PROCEDURE — 73030 X-RAY EXAM OF SHOULDER: CPT | Mod: TC,FY,PO,RT

## 2018-05-22 RX ORDER — TRAMADOL HYDROCHLORIDE 50 MG/1
50 TABLET ORAL EVERY 8 HOURS PRN
Qty: 30 TABLET | Refills: 0 | Status: SHIPPED | OUTPATIENT
Start: 2018-05-22 | End: 2018-06-01

## 2018-05-22 NOTE — PROGRESS NOTES
CC: right shoulder pain     68 y.o. Male  RHD, reports that the pain is severe and not responding to any conservative care.  Pt reports right shoulder pain for 1 month after lifting an object. Worse with extending the arm.     He reports that the pain is worse with overhead activity. It also bothers him at night.    Is affecting ADLs.     Review of Systems   Constitution: Negative. Negative for chills, fever and night sweats.   HENT: Negative for congestion and headaches.    Eyes: Negative for blurred vision, left vision loss and right vision loss.   Cardiovascular: Negative for chest pain and syncope.   Respiratory: Negative for cough and shortness of breath.    Endocrine: Negative for polydipsia, polyphagia and polyuria.   Hematologic/Lymphatic: Negative for bleeding problem. Does not bruise/bleed easily.   Skin: Negative for dry skin, itching and rash.   Musculoskeletal: Negative for falls and muscle weakness.   Gastrointestinal: Negative for abdominal pain and bowel incontinence.   Genitourinary: Negative for bladder incontinence and nocturia.   Neurological: Negative for disturbances in coordination, loss of balance and seizures.   Psychiatric/Behavioral: Negative for depression. The patient does not have insomnia.    Allergic/Immunologic: Negative for hives and persistent infections.     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    Sleep apnea      PAST SURGICAL HISTORY:   Past Surgical History:   Procedure Laterality Date    BACK SURGERY      1990's and 2009; last by Naldo    COLONOSCOPY N/A 1/30/2018    Procedure: COLONOSCOPY;  Surgeon: Nadia Scott MD;  Location[facility];  Service: Endoscopy;  Laterality: N/A;    Focal Laser        Right eye    HERNIA REPAIR Right     inguinal    SKIN CANCER EXCISION      SPINE SURGERY      l3-4/ l5-s1  (x 2)    TONSILLECTOMY       FAMILY HISTORY:   Family History   Problem Relation Age of Onset    Colon polyps Father     Cancer Father         leukemia    Glaucoma Mother     Thyroid disease Sister         hashimoto    Cancer Sister         renal    No Known Problems Sister     Heart failure Maternal Grandfather     Cataracts Maternal Grandmother     Alzheimer's disease Maternal Grandmother     No Known Problems Maternal Aunt     No Known Problems Maternal Uncle     No Known Problems Paternal Aunt     No Known Problems Paternal Uncle     No Known Problems Paternal Grandmother     Cancer Paternal Grandfather         colon    Diabetes Neg Hx     Heart disease Neg Hx     Amblyopia Neg Hx     Blindness Neg Hx     Macular degeneration Neg Hx     Retinal detachment Neg Hx     Strabismus Neg Hx     Hypertension Neg Hx     Stroke Neg Hx      SOCIAL HISTORY:   Social History     Social History    Marital status:      Spouse name: Joyce    Number of children: N/A    Years of education: N/A     Occupational History    [employer]     Social History Main Topics    Smoking status: Never Smoker    Smokeless tobacco: Never Used    Alcohol use 3.0 oz/week     6 Standard drinks or equivalent per week      Comment: 3 X WEEK     Drug use: No    Sexual activity: Not on file     Other Topics Concern    Not on file     Social History Narrative    No narrative on file       MEDICATIONS:   Current Outpatient Prescriptions:     aspirin (ECOTRIN) 325 MG EC tablet, Take 325 mg by mouth once daily., Disp: , Rfl:     calcium carbonate 220 mg capsule, Take 250 mg by mouth 2 (two) times daily with meals., Disp: , Rfl:     celecoxib (CELEBREX) 200 MG capsule, Take 200 mg by mouth 2 (two) times daily as needed. , Disp: , Rfl:     cholecalciferol, vitamin D3,  "2,000 unit Cap, Take 1 capsule by mouth once daily., Disp: , Rfl:     duloxetine (CYMBALTA) 30 MG capsule, TAKE 1 CAPSULE (30MG) BY MOUTH EVERY EVENING, Disp: 90 capsule, Rfl: 2    ibuprofen (ADVIL,MOTRIN) 200 MG tablet, Take 400 mg by mouth every 6 (six) hours as needed for Pain., Disp: , Rfl:     luliconazole (LUZU) 1 % Crea, Apply 1 application topically once daily., Disp: 60 g, Rfl: 3    metoprolol succinate (TOPROL-XL) 25 MG 24 hr tablet, Take 1 tablet (25 mg total) by mouth once daily., Disp: 90 tablet, Rfl: 3    MULTIVITAMIN W-MINERALS/LUTEIN (CENTRUM SILVER ORAL), Take by mouth., Disp: , Rfl:     omega-3 fatty acids-vitamin E (SUPER EPA) 1,000-5 mg-unit Cap, Take 1 capsule by mouth once daily. , Disp: , Rfl:     oxycodone-acetaminophen (PERCOCET)  mg per tablet, Take 1 tablet by mouth every 4 (four) hours as needed for Pain., Disp: , Rfl:     triamcinolone acetonide 0.1% (KENALOG) 0.1 % cream, AAA bid, Disp: 60 g, Rfl: 3    flecainide (TAMBOCOR) 150 MG Tab, Take 2 tablets (300 mg total) by mouth daily as needed., Disp: 60 tablet, Rfl: 6    folic acid (FOLVITE) 1 MG tablet, TAKE 1 TABLET (1MG TOTAL) BY MOUTH ONCE DAILY, Disp: 90 tablet, Rfl: 10    HUMIRA 40 mg/0.8 mL injection, INJECT 0.8 MLS (40 MG TOTAL) INTO THE SKIN EVERY 14 DAYS, Disp: 2 vial, Rfl: 11    methotrexate 2.5 MG Tab, TAKE 8 TABLETS BY MOUTH EVERY 7 DAYS, Disp: 96 tablet, Rfl: 0    methotrexate 2.5 MG Tab, TAKE 8 TABLETS BY MOUTH EVERY 7 DAYS, Disp: 96 tablet, Rfl: 0    pantoprazole (PROTONIX) 40 MG tablet, TAKE 1 TABLET (40MG) BY MOUTH ONCE DAILY, Disp: 90 tablet, Rfl: 10    predniSONE (DELTASONE) 1 MG tablet, Take 2 tablets (2 mg total) by mouth once daily., Disp: 60 tablet, Rfl: 0  ALLERGIES: Review of patient's allergies indicates:  No Known Allergies    VITAL SIGNS: /74   Pulse 76   Ht 6' 10" (2.083 m)   Wt 105.7 kg (233 lb)   BMI 24.36 kg/m²      PHYSICAL EXAMINATION:  General:  The patient is alert and " oriented x 3.  Mood is pleasant.  Observation of ears, eyes and nose reveal no gross abnormalities.  No labored breathing observed.  Gait is coordinated. Patient can toe walk and heel walk without difficulty.      right Shoulder / Upper Extremity Exam    OBSERVATION:     Swelling  none  Deformity  none   Discoloration  none   Scapular winging none   Scars   none  Atrophy  none    TENDERNESS / CREPITUS (T/C):          T/C      T/C   Clavicle   -/-  SUPRAspinatus    -/-     AC Jt.    -/-  INFRAspinatus  -/-    SC Jt.    -/-  Deltoid    -/-      G. Tuberosity  -/-  LH BICEP groove  -/-   Acromion:  -/-  Midline Neck   -/-     Scapular Spine -/-  Trapezium   -/-   SMA Scapula  -/-  GH jt. line - post  -/-     Scapulothoracic  -/-         ROM: (* = with pain)  Right shoulder   Left shoulder        AROM (PROM)   AROM (PROM)   FE    170° (175°)     170° (175°)     ER at 0°    60°  (65°)    60°  (65°)   ER at 90° ABD  90°  (90°)    90°  (90°)   IR at 90°  ABD   NA  (40°)     NA  (40°)      IR (spine level)   T10     T10    STRENGTH: (* = with pain) RIGHT SHOULDER  LEFT SHOULDER   SCAPTION   4+*/5    5/5    IR    5/5    5/5   ER    5/5    5/5   BICEPS   5/5    5/5   Deltoid    5/5    5/5     SIGNS:  Painful side       NEER   +    OJAS  neg    BRUNO   +    SPEEDS  neg     DROP ARM   neg   BELLY PRESS neg   Superior escape none    LIFT-OFF  neg   X-Body ADD    neg    MOVING VALGUS neg        STABILITY TESTING    RIGHT SHOULDER   LEFT SHOULDER     Translation     Anterior  up face     up face    Posterior  up face    up face    Sulcus   < 10mm    < 10 mm     Signs   Apprehension   neg      neg       Relocation   no change     no change      Jerk test  neg     neg    EXTREMITY NEURO-VASCULAR EXAM    Sensation grossly intact to light touch all dermatomal regions.    DTR 2+ Biceps, Triceps, BR and Negative Juan Miguels sign   Grossly intact motor function at Elbow, Wrist and Hand   Distal pulses radial and ulnar 2+,  brisk cap refill, symmetric.      NECK:  Painless FROM and spinous processes non-tender. Negative Spurlings sign.      OTHER FINDINGS:  + scapular dyskinesia    XRAYS:  Shoulder trauma series right,  were obtained and reviewed  No convincing fracture or dislocation is noted. The osseous structures appear well mineralized and well aligned        ASSESSMENT:   right:  1. Shoulder pain, impingement   2.  Scapular dyskinesia    PLAN:    HEP  RTC 4-6 wks      All questions were answered, pt will contact us for questions or concerns in the interim.

## 2018-05-23 ENCOUNTER — OFFICE VISIT (OUTPATIENT)
Dept: RHEUMATOLOGY | Facility: CLINIC | Age: 68
End: 2018-05-23
Payer: COMMERCIAL

## 2018-05-23 VITALS
WEIGHT: 233 LBS | SYSTOLIC BLOOD PRESSURE: 136 MMHG | DIASTOLIC BLOOD PRESSURE: 83 MMHG | BODY MASS INDEX: 24.36 KG/M2 | HEART RATE: 57 BPM

## 2018-05-23 DIAGNOSIS — M06.00 SERONEGATIVE RHEUMATOID ARTHRITIS: Primary | ICD-10-CM

## 2018-05-23 DIAGNOSIS — Z51.81 ENCOUNTER FOR METHOTREXATE MONITORING: ICD-10-CM

## 2018-05-23 DIAGNOSIS — Z79.631 ENCOUNTER FOR METHOTREXATE MONITORING: ICD-10-CM

## 2018-05-23 PROCEDURE — 99999 PR PBB SHADOW E&M-EST. PATIENT-LVL III: CPT | Mod: PBBFAC,,, | Performed by: INTERNAL MEDICINE

## 2018-05-23 PROCEDURE — 99214 OFFICE O/P EST MOD 30 MIN: CPT | Mod: S$GLB,,, | Performed by: INTERNAL MEDICINE

## 2018-05-23 NOTE — PROGRESS NOTES
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    Chief Complaint: Joint Pain     HPI 64 yo male with PMH of ALLAN, atrial fibrillation on 325 mg qday, l3-l4 laminectomy around 2000, 2009( L5-51 dissecotmy), cervical epidural, right inguinal hernia, and kidney stones.  here for evaluation of shoulder and hip stiffness.  He has trouble with rising and sitting from chair.  He also has trouble putting on his jacket. He feels fatigued.  Symptoms have been on going for 2 months.He also reports trouble with weakness in  in both hands and shooting pain  from his neck down to his arms. The pain wakes him up at night. Reports 20 pounds weight loss.   Feels sometimes that legs are weak.  He has appt with neurosurgeon tomorrow.  No headaches, but maybe mild pressure in temples ( he reports reading about [facility]).  Denies any swelling.  Reports that he had some stiffness in morning that has improved in morning since starting prednisone.    Interval history:  He saw Dr. Costello and plan was to do carpal release surgery.  He is here for follow up of  Seronegative RA.  He stopped enbrel 12 weeks ago. He is off MTX  For 6 to 8 weeks   He reports stiffness all day long. He reports  He also reports having swelling.  He is off  Prednisone for at least 5 months.  He is taking cymbalta at bedtime.  He uses his cpap machine all the time.        Past Medical History   Diagnosis Date    Sleep apnea     Atrial fibrillation     Colon polyps     Retinal hole      Right eye    Cataract        Review of Systems   Constitutional: Positive for fatigue. Negative for fever, chills and appetite change.   HENT: Negative for hearing loss, mouth sores, rhinorrhea, sinus pressure and trouble swallowing.    Eyes: Negative for photophobia, pain, discharge, itching and visual disturbance.   Respiratory: Negative for cough, chest tightness, wheezing and stridor.    Cardiovascular: Negative for chest pain and palpitations.    Gastrointestinal: Negative for blood in stool and abdominal distention.   Endocrine: Negative for cold intolerance and heat intolerance.   Genitourinary: Negative for dysuria, hematuria and flank pain.   Musculoskeletal: Positive for myalgias, arthralgias and neck pain. Negative for back pain, joint swelling, gait problem and neck stiffness.   Skin: Negative for color change, pallor and rash.   Neurological: Negative for dizziness, light-headedness, numbness and headaches.   Hematological: Negative for adenopathy. Does not bruise/bleed easily.   Psychiatric/Behavioral: Negative for decreased concentration and agitation. The patient is not nervous/anxious.            Objective:     Physical Exam   Constitutional: He is oriented to person, place, and time. No distress.   HENT:   Head: Normocephalic and atraumatic.   Eyes: Conjunctivae are normal. Pupils are equal, round, and reactive to light.   Neck: No tracheal deviation present. No thyromegaly present.   Cardiovascular: Normal rate, regular rhythm and normal heart sounds.  Exam reveals no gallop and no friction rub.    No murmur heard.  Pulmonary/Chest: No stridor. No respiratory distress. He has no wheezes. He has no rales. He exhibits no tenderness.   Abdominal: Soft. He exhibits no distension. There is no tenderness. There is no rebound.   Neurological: He is alert and oriented to person, place, and time.   Decreased  strength in both hands   Skin: Skin is warm. He is not diaphoretic. ; bulls eye appearing lesion in left groin; erythematous lacy lesion on left foot and small punctate lesions  In left sole  Musculoskeletal:    Shoulders-abduction to 160 degrees bilaterally (worse than last visit)  Trouble rising from chair (resolved)  Motor- 5/5 strength in upper and lower extremity proximally but has decreased  strength (improved since last visit)  Hands- able to make almost full fist now bilaterally  Wrists- no synovitis         8/2015  Ina-+    subserologies-negative  Esr- 40<51<24  crp- 5<35  Rf,ccp-negative  cpk- 115<92  < 676 (2013)  Aldolase-wnl  spep-wnl  Irma-negative       Thoracic xray (2015): There are anterior flowing osteophytes in the midthoracic spine.    Xrays: (shoulder) -9/2015:  Small high density foci along the margin of the humeral head bilaterally suggestive for calcific tendinitis more prominent on the right. There is   degenerative change of the AC joints also more prominent on the right with hypertrophic spurring.    Mri (cervical):  (2015)Degenerative changes of the cervical spine, most prominent at C5-6 causing mild central spinal canal stenosis.      Mri lumbar:(2015)   Multilevel, moderate to severe lumbar spondylosis is present, as detailed above:  -- severe neural foraminal narrowing on the left at L1-2, on the right at L3-4, bilaterally at L4-5 and on the right at L5-S1.  -- multilevel, moderately severe acquired spinal canal stenosis most pronounced at L4-5.  -- multilevel moderate to severe bilateral facet arthrosis.  -- postoperative changes of left L5 laminectomy and suspected laminotomy at L3.     CT abdomen (2015):visualized portion of the aorta is significant for mild calcification and plaque formation. The right and left kidneys each contain two punctate stones which measure approximately 0.2 cm. The right kidney contains an exophytic 1.4-cm hypodensity extending off the inferior pole which is incompletely characterized but likely represents a cyst. The left kidney contains a 1 cm hypodensity within the superior pole which is incompletely characterized but likely represents a cyst. The     Chest xray (2015): WNL   emg/ncs: (10/20/2015): predominantly sensory axonal polyneuropathy; mild bilateral carpal tunnel syndrome; chronic right c7-c8 radiculopathy may also be present    Bone scan (10/2015):.Degenerative changes in the glenohumeral and SI joints.    Assessment:      67 yo male with PMH of ALLAN, atrial fibrillation,  CTS, cervical radiculopathy, polyneuropathy, renal cysts   here for  For follow up of seronegative RA. Patient initially presented with shoulder and hip stiffness, decreased  strength of both hands,  Hand swelling/stiffness ,anorexia with weight loss.  Did well initially on Humira but then it stopped working so switched him to Enbrel. He took it for 2 months and reports he did not notice major difference FROM Humira.  Clinically on exam and on labs, he improved with MTX, prednisone, and biologics.  Patient decided to stop his medications on his own because he reports he does not think he needs them at this time. I told him that I think he will flare off medications but I am fine with monitoring his labs and clinically.    1) Seronegative RA:  -holding MTX and Enbrel  Labs reviewed  2.fibromyalgia a-controlled.  -continue cymbalta 30mg a day.    rtc in 12   weeks

## 2018-06-22 RX ORDER — DULOXETIN HYDROCHLORIDE 30 MG/1
CAPSULE, DELAYED RELEASE ORAL
Qty: 90 CAPSULE | Refills: 6 | Status: SHIPPED | OUTPATIENT
Start: 2018-06-22 | End: 2018-08-14

## 2018-07-03 ENCOUNTER — OFFICE VISIT (OUTPATIENT)
Dept: OPTOMETRY | Facility: CLINIC | Age: 68
End: 2018-07-03
Payer: COMMERCIAL

## 2018-07-03 DIAGNOSIS — H35.363 DRUSEN OF MACULA OF BOTH EYES: ICD-10-CM

## 2018-07-03 DIAGNOSIS — H25.13 NUCLEAR SCLEROSIS, BILATERAL: Primary | ICD-10-CM

## 2018-07-03 PROCEDURE — 99999 PR PBB SHADOW E&M-EST. PATIENT-LVL II: CPT | Mod: PBBFAC,,, | Performed by: OPTOMETRIST

## 2018-07-03 PROCEDURE — 92014 COMPRE OPH EXAM EST PT 1/>: CPT | Mod: S$GLB,,, | Performed by: OPTOMETRIST

## 2018-07-03 PROCEDURE — 92015 DETERMINE REFRACTIVE STATE: CPT | Mod: S$GLB,,, | Performed by: OPTOMETRIST

## 2018-07-03 NOTE — PROGRESS NOTES
HPI     DLS: 6/27/2017  Pt here for check of ocular health.  Pt without visual complaints OU. Pt   states he wears readers only +1.50.  Pt hasn't noticed any changes on the   amsler grid.   Occasional floaters  Denies flashes      No eyedrops    CAT OU   MAC DRUSEN OU --no amsler changes  Sp focal laser for periph hole OD   Hx 6th nerve palsy      Last edited by Oswald Rangel, OD on 7/3/2018  4:11 PM. (History)        ROS     Positive for: Eyes (Hx 6th n palsy/mac drusen OU)    Negative for: Constitutional, Gastrointestinal, Neurological, Skin,   Genitourinary, Musculoskeletal, HENT, Endocrine, Cardiovascular,   Respiratory, Psychiatric, Allergic/Imm, Heme/Lymph    Last edited by Oswald Rangel, OD on 7/3/2018  4:11 PM. (History)        Assessment /Plan     For exam results, see Encounter Report.    Nuclear sclerosis, bilateral    Drusen of macula of both eyes        1. Cat ou--pt happy w spex Rx  2. Mac drusen OU--stable.   Pt to cont w amsler grid use/vitamins  3. Sp focal laser for periph hole OD.  Stable  4. 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

## 2018-07-10 ENCOUNTER — PATIENT MESSAGE (OUTPATIENT)
Dept: FAMILY MEDICINE | Facility: CLINIC | Age: 68
End: 2018-07-10

## 2018-07-10 DIAGNOSIS — G47.33 OSA ON CPAP: Primary | ICD-10-CM

## 2018-07-24 ENCOUNTER — PATIENT MESSAGE (OUTPATIENT)
Dept: FAMILY MEDICINE | Facility: CLINIC | Age: 68
End: 2018-07-24

## 2018-07-24 ENCOUNTER — TELEPHONE (OUTPATIENT)
Dept: SLEEP MEDICINE | Facility: CLINIC | Age: 68
End: 2018-07-24

## 2018-07-24 DIAGNOSIS — G47.33 OSA ON CPAP: ICD-10-CM

## 2018-07-24 DIAGNOSIS — Z00.00 ROUTINE GENERAL MEDICAL EXAMINATION AT A HEALTH CARE FACILITY: Primary | ICD-10-CM

## 2018-07-24 DIAGNOSIS — Z11.1 SCREENING-PULMONARY TB: ICD-10-CM

## 2018-07-24 NOTE — TELEPHONE ENCOUNTER
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 Strong's labs too. Thanks    Orders Placed This Encounter   Procedures    Hemoglobin A1c    Lipid panel    TSH    QUANTIFERON GOLD TB    PSA, Screening           "Loyd,  I apologize for not responding to your [facility] messages. I generally don't do much personal stuff on the website and I just flat missed your prompt reply. I have the appointment scheduled in mid-August for the sleep study and it looks like it will be covered. The only sleep study I ever did was probably over 12 years ago at [facility]. I will try to get the report. My pressure setting has been 8 since day one.  The annual wellness visit will be coming around soon. If you could place orders for all of my labwork, I can do one lab visit  for Dr Archer , you,and a Gold Interferon TB test that Dr Joyce says is due. Please include anything at all that might be worth checking.  Thank you very much,  Adam Buchanan"  "

## 2018-07-25 ENCOUNTER — PATIENT MESSAGE (OUTPATIENT)
Dept: FAMILY MEDICINE | Facility: CLINIC | Age: 68
End: 2018-07-25

## 2018-08-01 ENCOUNTER — LAB VISIT (OUTPATIENT)
Dept: LAB | Facility: HOSPITAL | Age: 68
End: 2018-08-01
Attending: INTERNAL MEDICINE
Payer: COMMERCIAL

## 2018-08-01 DIAGNOSIS — Z00.00 ROUTINE GENERAL MEDICAL EXAMINATION AT A HEALTH CARE FACILITY: ICD-10-CM

## 2018-08-01 DIAGNOSIS — G47.33 OSA ON CPAP: ICD-10-CM

## 2018-08-01 DIAGNOSIS — M06.00 SERONEGATIVE RHEUMATOID ARTHRITIS: ICD-10-CM

## 2018-08-01 DIAGNOSIS — Z11.1 SCREENING-PULMONARY TB: ICD-10-CM

## 2018-08-01 LAB
ALBUMIN SERPL BCP-MCNC: 3.8 G/DL
ALP SERPL-CCNC: 95 U/L
ALT SERPL W/O P-5'-P-CCNC: 24 U/L
ANION GAP SERPL CALC-SCNC: 5 MMOL/L
AST SERPL-CCNC: 29 U/L
BASOPHILS # BLD AUTO: 0.02 K/UL
BASOPHILS NFR BLD: 0.3 %
BILIRUB SERPL-MCNC: 0.5 MG/DL
BUN SERPL-MCNC: 14 MG/DL
CALCIUM SERPL-MCNC: 9.9 MG/DL
CHLORIDE SERPL-SCNC: 105 MMOL/L
CHOLEST SERPL-MCNC: 168 MG/DL
CHOLEST/HDLC SERPL: 3.7 {RATIO}
CO2 SERPL-SCNC: 30 MMOL/L
COMPLEXED PSA SERPL-MCNC: 0.49 NG/ML
CREAT SERPL-MCNC: 0.9 MG/DL
CRP SERPL-MCNC: 7.8 MG/L
DIFFERENTIAL METHOD: ABNORMAL
EOSINOPHIL # BLD AUTO: 0.2 K/UL
EOSINOPHIL NFR BLD: 2.2 %
ERYTHROCYTE [DISTWIDTH] IN BLOOD BY AUTOMATED COUNT: 13.1 %
ERYTHROCYTE [SEDIMENTATION RATE] IN BLOOD BY WESTERGREN METHOD: 26 MM/HR
EST. GFR  (AFRICAN AMERICAN): >60 ML/MIN/1.73 M^2
EST. GFR  (NON AFRICAN AMERICAN): >60 ML/MIN/1.73 M^2
ESTIMATED AVG GLUCOSE: 103 MG/DL
GLUCOSE SERPL-MCNC: 117 MG/DL
HBA1C MFR BLD HPLC: 5.2 %
HCT VFR BLD AUTO: 40.5 %
HDLC SERPL-MCNC: 46 MG/DL
HDLC SERPL: 27.4 %
HGB BLD-MCNC: 13.4 G/DL
LDLC SERPL CALC-MCNC: 107.6 MG/DL
LYMPHOCYTES # BLD AUTO: 2.5 K/UL
LYMPHOCYTES NFR BLD: 36.4 %
MCH RBC QN AUTO: 29.2 PG
MCHC RBC AUTO-ENTMCNC: 33.1 G/DL
MCV RBC AUTO: 88 FL
MONOCYTES # BLD AUTO: 0.3 K/UL
MONOCYTES NFR BLD: 5 %
NEUTROPHILS # BLD AUTO: 3.8 K/UL
NEUTROPHILS NFR BLD: 56 %
NONHDLC SERPL-MCNC: 122 MG/DL
PLATELET # BLD AUTO: 221 K/UL
PMV BLD AUTO: 9.7 FL
POTASSIUM SERPL-SCNC: 4.4 MMOL/L
PROT SERPL-MCNC: 7.8 G/DL
RBC # BLD AUTO: 4.59 M/UL
SODIUM SERPL-SCNC: 140 MMOL/L
TRIGL SERPL-MCNC: 72 MG/DL
TSH SERPL DL<=0.005 MIU/L-ACNC: 1.82 UIU/ML
WBC # BLD AUTO: 6.79 K/UL

## 2018-08-01 PROCEDURE — 84443 ASSAY THYROID STIM HORMONE: CPT

## 2018-08-01 PROCEDURE — 83036 HEMOGLOBIN GLYCOSYLATED A1C: CPT

## 2018-08-01 PROCEDURE — 86480 TB TEST CELL IMMUN MEASURE: CPT

## 2018-08-01 PROCEDURE — 85025 COMPLETE CBC W/AUTO DIFF WBC: CPT

## 2018-08-01 PROCEDURE — 80061 LIPID PANEL: CPT

## 2018-08-01 PROCEDURE — 36415 COLL VENOUS BLD VENIPUNCTURE: CPT

## 2018-08-01 PROCEDURE — 80053 COMPREHEN METABOLIC PANEL: CPT

## 2018-08-01 PROCEDURE — 86140 C-REACTIVE PROTEIN: CPT

## 2018-08-01 PROCEDURE — 84153 ASSAY OF PSA TOTAL: CPT

## 2018-08-01 PROCEDURE — 85652 RBC SED RATE AUTOMATED: CPT

## 2018-08-03 LAB
MITOGEN IGNF BCKGRD COR BLD-ACNC: 0.05 IU/ML
MITOGEN NIL: >10 IU/ML
TB GOLD PLUS: NEGATIVE
TB1 - NIL: 0.01 IU/ML
TB2 - NIL: 0 IU/ML

## 2018-08-06 RX ORDER — METOPROLOL SUCCINATE 25 MG/1
25 TABLET, EXTENDED RELEASE ORAL DAILY
Qty: 90 TABLET | Refills: 3 | Status: SHIPPED | OUTPATIENT
Start: 2018-08-06 | End: 2019-08-17 | Stop reason: SDUPTHER

## 2018-08-08 ENCOUNTER — OFFICE VISIT (OUTPATIENT)
Dept: RHEUMATOLOGY | Facility: CLINIC | Age: 68
End: 2018-08-08
Payer: COMMERCIAL

## 2018-08-08 ENCOUNTER — PATIENT MESSAGE (OUTPATIENT)
Dept: RHEUMATOLOGY | Facility: CLINIC | Age: 68
End: 2018-08-08

## 2018-08-08 VITALS
HEART RATE: 56 BPM | DIASTOLIC BLOOD PRESSURE: 76 MMHG | SYSTOLIC BLOOD PRESSURE: 125 MMHG | WEIGHT: 232.81 LBS | HEIGHT: 72 IN | BODY MASS INDEX: 31.53 KG/M2

## 2018-08-08 DIAGNOSIS — M06.9 RHEUMATOID ARTHRITIS INVOLVING MULTIPLE JOINTS: Primary | ICD-10-CM

## 2018-08-08 PROCEDURE — 99214 OFFICE O/P EST MOD 30 MIN: CPT | Mod: S$GLB,,, | Performed by: INTERNAL MEDICINE

## 2018-08-08 PROCEDURE — 99999 PR PBB SHADOW E&M-EST. PATIENT-LVL II: CPT | Mod: PBBFAC,,, | Performed by: INTERNAL MEDICINE

## 2018-08-08 NOTE — PROGRESS NOTES
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    Chief Complaint: Joint Pain     HPI 66 yo male with PMH of ALLAN, atrial fibrillation on 325 mg qday, l3-l4 laminectomy around 2000, 2009( L5-51 dissecotmy), cervical epidural, right inguinal hernia, and kidney stones.  here for evaluation of shoulder and hip stiffness.  He has trouble with rising and sitting from chair.  He also has trouble putting on his jacket. He feels fatigued.  Symptoms have been on going for 2 months.He also reports trouble with weakness in  in both hands and shooting pain  from his neck down to his arms. The pain wakes him up at night. Reports 20 pounds weight loss.   Feels sometimes that legs are weak.  He has appt with neurosurgeon tomorrow.  No headaches, but maybe mild pressure in temples ( he reports reading about [facility]).  Denies any swelling.  Reports that he had some stiffness in morning that has improved in morning since starting prednisone.    Interval history:  He has pain in neck, right hip. He has stiffness in both hands.  He feels like he has more trouble MAKING A FIST, BUT HE THINKS IT HAS IMPROVED.  He is here for follow up of  Seronegative RA.   He reports stiffness all day long. He reports  He also reports having swelling.  He is taking cymbalta at bedtime.  He uses his cpap machine all the time.        Past Medical History   Diagnosis Date    Sleep apnea     Atrial fibrillation     Colon polyps     Retinal hole      Right eye    Cataract        Review of Systems   Constitutional: Positive for fatigue. Negative for fever, chills and appetite change.   HENT: Negative for hearing loss, mouth sores, rhinorrhea, sinus pressure and trouble swallowing.    Eyes: Negative for photophobia, pain, discharge, itching and visual disturbance.   Respiratory: Negative for cough, chest tightness, wheezing and stridor.    Cardiovascular: Negative for chest pain and palpitations.   Gastrointestinal: Negative for  blood in stool and abdominal distention.   Endocrine: Negative for cold intolerance and heat intolerance.   Genitourinary: Negative for dysuria, hematuria and flank pain.   Musculoskeletal: Positive for myalgias, arthralgias and neck pain. Negative for back pain, joint swelling, gait problem and neck stiffness.   Skin: Negative for color change, pallor and rash.   Neurological: Negative for dizziness, light-headedness, numbness and headaches.   Hematological: Negative for adenopathy. Does not bruise/bleed easily.   Psychiatric/Behavioral: Negative for decreased concentration and agitation. The patient is not nervous/anxious.            Objective:     Physical Exam   Constitutional: He is oriented to person, place, and time. No distress.   HENT:   Head: Normocephalic and atraumatic.   Eyes: Conjunctivae are normal. Pupils are equal, round, and reactive to light.   Neck: No tracheal deviation present. No thyromegaly present.   Cardiovascular: Normal rate, regular rhythm and normal heart sounds.  Exam reveals no gallop and no friction rub.    No murmur heard.  Pulmonary/Chest: No stridor. No respiratory distress. He has no wheezes. He has no rales. He exhibits no tenderness.   Abdominal: Soft. He exhibits no distension. There is no tenderness. There is no rebound.   Neurological: He is alert and oriented to person, place, and time.   Decreased  strength in both hands   Skin: Skin is warm. He is not diaphoretic. ; bulls eye appearing lesion in left groin; erythematous lacy lesion on left foot and small punctate lesions  In left sole  Musculoskeletal:    Shoulders-abduction to 160 degrees bilaterally (worse than last visit)  Trouble rising from chair (resolved)  Motor- 5/5 strength in upper and lower extremity proximally but has decreased  strength (improved since last visit)  Hands- able to make almost full fist now bilaterally  Wrists- no synovitis         8/2015  Ina-+   subserologies-negative  Esr-  40<51<24  crp- 5<35  Rf,ccp-negative  cpk- 115<92  < 676 (2013)  Aldolase-wnl  spep-wnl  Irma-negative       Thoracic xray (2015): There are anterior flowing osteophytes in the midthoracic spine.    Xrays: (shoulder) -9/2015:  Small high density foci along the margin of the humeral head bilaterally suggestive for calcific tendinitis more prominent on the right. There is   degenerative change of the AC joints also more prominent on the right with hypertrophic spurring.    Mri (cervical):  (2015)Degenerative changes of the cervical spine, most prominent at C5-6 causing mild central spinal canal stenosis.      Mri lumbar:(2015)   Multilevel, moderate to severe lumbar spondylosis is present, as detailed above:  -- severe neural foraminal narrowing on the left at L1-2, on the right at L3-4, bilaterally at L4-5 and on the right at L5-S1.  -- multilevel, moderately severe acquired spinal canal stenosis most pronounced at L4-5.  -- multilevel moderate to severe bilateral facet arthrosis.  -- postoperative changes of left L5 laminectomy and suspected laminotomy at L3.     CT abdomen (2015):visualized portion of the aorta is significant for mild calcification and plaque formation. The right and left kidneys each contain two punctate stones which measure approximately 0.2 cm. The right kidney contains an exophytic 1.4-cm hypodensity extending off the inferior pole which is incompletely characterized but likely represents a cyst. The left kidney contains a 1 cm hypodensity within the superior pole which is incompletely characterized but likely represents a cyst. The     Chest xray (2015): WNL   emg/ncs: (10/20/2015): predominantly sensory axonal polyneuropathy; mild bilateral carpal tunnel syndrome; chronic right c7-c8 radiculopathy may also be present    Bone scan (10/2015):.Degenerative changes in the glenohumeral and SI joints.    Assessment:      69 yo male with PMH of ALLAN, atrial fibrillation, CTS, cervical radiculopathy,  polyneuropathy, renal cysts   here for  For follow up of seronegative RA. Patient initially presented with shoulder and hip stiffness, decreased  strength of both hands,  Hand swelling/stiffness ,anorexia with weight loss.  Did well initially on Humira but then it stopped working so switched him to Enbrel. He took it for 2 months and reports he did not notice major difference FROM Humira.  Clinically on exam and on labs, he improved with MTX, prednisone, and biologics.  Patient decided to stop his medications on his own because he reports he does not think he needs them at this time. I told him that I think he will flare off medications but I am fine with monitoring his labs and clinically.    1) Seronegative RA:  -holding MTX and Enbrel  Labs reviewed and in 3 months   Xrays due in January 2.fibromyalgia a-controlled.  -continue cymbalta 30mg a day.    rtc in 12   weeks

## 2018-08-14 ENCOUNTER — OFFICE VISIT (OUTPATIENT)
Dept: FAMILY MEDICINE | Facility: CLINIC | Age: 68
End: 2018-08-14
Payer: COMMERCIAL

## 2018-08-14 VITALS
SYSTOLIC BLOOD PRESSURE: 123 MMHG | HEART RATE: 57 BPM | OXYGEN SATURATION: 96 % | DIASTOLIC BLOOD PRESSURE: 75 MMHG | BODY MASS INDEX: 31.32 KG/M2 | HEIGHT: 73 IN | WEIGHT: 236.31 LBS

## 2018-08-14 DIAGNOSIS — Z98.890 S/P LUMBAR SPINE OPERATION: ICD-10-CM

## 2018-08-14 DIAGNOSIS — G47.33 OSA ON CPAP: ICD-10-CM

## 2018-08-14 DIAGNOSIS — M50.30 DDD (DEGENERATIVE DISC DISEASE), CERVICAL: ICD-10-CM

## 2018-08-14 DIAGNOSIS — M06.00 SERONEGATIVE RHEUMATOID ARTHRITIS: ICD-10-CM

## 2018-08-14 DIAGNOSIS — I48.0 PAROXYSMAL ATRIAL FIBRILLATION: ICD-10-CM

## 2018-08-14 DIAGNOSIS — Z00.00 ROUTINE GENERAL MEDICAL EXAMINATION AT A HEALTH CARE FACILITY: Primary | ICD-10-CM

## 2018-08-14 PROCEDURE — 90471 IMMUNIZATION ADMIN: CPT | Mod: S$GLB,,, | Performed by: FAMILY MEDICINE

## 2018-08-14 PROCEDURE — 99397 PER PM REEVAL EST PAT 65+ YR: CPT | Mod: 25,S$GLB,, | Performed by: FAMILY MEDICINE

## 2018-08-14 PROCEDURE — 90732 PPSV23 VACC 2 YRS+ SUBQ/IM: CPT | Mod: S$GLB,,, | Performed by: FAMILY MEDICINE

## 2018-08-14 PROCEDURE — 99999 PR PBB SHADOW E&M-EST. PATIENT-LVL III: CPT | Mod: PBBFAC,,, | Performed by: FAMILY MEDICINE

## 2018-08-14 RX ORDER — DULOXETIN HYDROCHLORIDE 60 MG/1
60 CAPSULE, DELAYED RELEASE ORAL DAILY
Qty: 90 CAPSULE | Refills: 3 | Status: SHIPPED | OUTPATIENT
Start: 2018-08-14 | End: 2019-07-29

## 2018-08-14 NOTE — PROGRESS NOTES
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  On metoprolol as well for rate control.     Follows with rheumatology for seronegative RA.  Last appointment on 08/08/2018, notes reviewed.  He had been on Humira, methotrexate, prednisone.   also on Cymbalta 30 mg daily for arthritis pains .     Sleep apnea, compliant with CPAP, finds that this is helping.  Last sleep study was many years ago at [facility].  I ordered an up-to-date sleep study , scheduled on 08/20/2018.  He uses CPAP every night.      Bilateral hip osteoarthritis, followed with [facility] orthopedics, concerned that he may need a hip replacement but is waiting a few more years     He does have lumbar and cervical DDD and spinal stenosis noted on prior MRI imaging in 2015.       Does feel fatigued in the last couple of months.  Usually at the end of the day.  His wife notices that he sometimes forgets things but patient feels like it might be more of a lack of attentiveness in the evening.  He denies any forgetful issues when he is at work.  He still enjoys work but is considering retiring and weighing the implications of this.  Does endorse lack of interest in certain activities he normally would very much enjoyed.  He does feel decreased energy, and also has noticed some increasing sleep recently.  As above, he will be getting an updated sleep study    Occasional left upper quadrant pain, sometimes at bedtime.  Does take ibuprofen for his arthritis pains as well, usually just periodically but has been taking more frequently every day recently.  Sometimes will take on an empty stomach at nighttime before bed.   Denies any nausea, vomiting, constipation, diarrhea, blood in the stool.    Past Medical History:   Diagnosis Date    Atrial fibrillation     1st diagnosed in med school    Basal cell carcinoma     right temporal scalp    Cataract     Colon polyps     Retinal hole     Right eye    Seronegative rheumatoid arthritis     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    Sleep apnea        Past Surgical History:   Procedure Laterality Date    BACK SURGERY      1990's and 2009; last by Naldo    Focal Laser       Right eye    HERNIA REPAIR Right     inguinal    SKIN CANCER EXCISION      SPINE SURGERY      l3-4/ l5-s1  (x 2)    TONSILLECTOMY         Family History   Problem Relation Age of Onset    Colon polyps Father     Cancer Father         leukemia    Glaucoma Mother     Thyroid disease Sister         hashimoto    Cancer Sister         renal    No Known Problems Sister     Heart failure Maternal Grandfather     Cataracts Maternal Grandmother     Alzheimer's disease Maternal Grandmother     No Known Problems Maternal Aunt     No Known Problems Maternal Uncle     No Known Problems Paternal Aunt     No Known Problems Paternal Uncle     No Known Problems Paternal Grandmother     Cancer Paternal Grandfather         colon    Diabetes Neg Hx     Heart disease Neg Hx     Amblyopia Neg Hx     Blindness Neg Hx     Macular degeneration Neg Hx     Retinal detachment Neg Hx     Strabismus Neg Hx     Hypertension Neg Hx     Stroke Neg Hx        Social History     Socioeconomic History    Marital status:      Spouse name: Joyce    Number of children: Not on file    Years of education: Not on file    Highest education level: Not on file   Social Needs    Financial resource strain: Not on file    Food insecurity - worry: Not on file    Food insecurity - inability: Not on file    Transportation needs - medical: Not on file    Transportation needs - non-medical: Not on file    Occupational History    Occupation: [employer]     Employer: [employer]   Tobacco Use    Smoking status: Never Smoker    Smokeless tobacco: Never Used   Substance and Sexual Activity    Alcohol use: Yes     Alcohol/week: 3.0 oz     Types: 6 Standard drinks or equivalent per week     Comment: 3 X WEEK     Drug use: No    Sexual activity: Not on file   Other Topics Concern    Not on file   Social History Narrative    Not on file     Lab Results   Component Value Date    WBC 6.79 08/01/2018    HGB 13.4 (L) 08/01/2018    HCT 40.5 08/01/2018     08/01/2018    CHOL 168 08/01/2018    TRIG 72 08/01/2018    HDL 46 08/01/2018    ALT 24 08/01/2018    AST 29 08/01/2018     08/01/2018    K 4.4 08/01/2018     08/01/2018    CREATININE 0.9 08/01/2018    CALCIUM 9.9 08/01/2018    BUN 14 08/01/2018    CO2 30 (H) 08/01/2018    TSH 1.818 08/01/2018    PSA 0.49 08/01/2018    INR 1.1 03/20/2012    HGBA1C 5.2 08/01/2018    LDLCALC 107.6 08/01/2018     (H) 08/01/2018                   Vital signs reviewed  PE:   APPEARANCE: Well nourished, well developed, in no acute distress.    HEAD: Normocephalic, atraumatic.  EYES: PERRL. EOMI.   Conjunctivae noninjected.  EARS: TM's intact. Light reflex normal. No retraction or perforation.    NOSE: Mucosa pink. Airway clear.  MOUTH & THROAT: No tonsillar enlargement. No pharyngeal erythema or exudate.   NECK: Supple with no cervical lymphadenopathy.  No thyromegaly. No carotid bruits  CHEST: Good inspiratory effort. Lungs clear to auscultation with no wheezes or crackles.  CARDIOVASCULAR: Normal S1, S2. No rubs, murmurs, or gallops.  ABDOMEN: Bowel sounds normal. Not distended. Soft. No tenderness or masses. No organomegaly.  EXTREMITIES: No edema, cyanosis, or clubbing.      IMPRESSION  1. Routine general medical examination at a health care facility    2. Seronegative rheumatoid arthritis    3. ALLAN on CPAP    4. DDD (degenerative disc disease),  cervical    5. S/P lumbar spine operation    6. Paroxysmal atrial fibrillation        PLAN  Orders Placed This Encounter   Procedures    (In Office Administered) Pneumococcal Polysaccharide Vaccine (23 Valent) (SQ/IM)     Health screenings discussed.  His recent labs were discussed, overall normal range    fatigue issues as described above could be related to uncontrolled sleep apnea, perhaps mood disorders.  Patient denies truly depressed mood but has some other symptoms that could be corresponding with depression .  Does endorse a lot of stressors even at home with his wife's medical illnesses, also taking care of 3 dogs each with a separate medical problem that needs extra care.  We discussed increasing his Cymbalta from 30 mg to 60 mg to see if this will help his mood and he is willing to try this.    Sleep study scheduled for next week    He is off of his rheumatoid arthritis medications.  Feels like his pains are manageable.  Takes ibuprofen which seems to help.  Advise trial of omeprazole with this given some of the left upper quadrant discomfort he gets periodically.  Also try to take the NSAIDs with food.    Discussed elevated ASCVD risk score and rationale for statin initiation.  We discussed risks and benefits.  Patient declines statin at this time but will think about it.        HEALTH SCREENINGS  Immunizations:  Tdap 2014  PCV 10/17/17  PPSV today     Age/Gender Appropriate screenings:  Prostate screening : VERONICA 2017, PSA as above 0.49 in 2018  Colonoscopy 2018.  Internal hemorrhoids, otherwise normal.  Return in 5 years       Answers for HPI/ROS submitted by the patient on 8/12/2018   activity change: No  unexpected weight change: No  neck pain: Yes  hearing loss: No  rhinorrhea: Yes  trouble swallowing: No  eye discharge: No  visual disturbance: No  chest tightness: No  wheezing: No  chest pain: No  palpitations: No  blood in stool: No  constipation: No  vomiting: No  diarrhea: No  polydipsia:  No  polyuria: No  difficulty urinating: No  urgency: No  hematuria: No  joint swelling: No  arthralgias: Yes  headaches: No  weakness: No  confusion: No  dysphoric mood: No

## 2018-08-20 ENCOUNTER — HOSPITAL ENCOUNTER (OUTPATIENT)
Dept: SLEEP MEDICINE | Facility: HOSPITAL | Age: 68
Discharge: HOME | End: 2018-08-20
Attending: FAMILY MEDICINE
Payer: COMMERCIAL

## 2018-08-20 DIAGNOSIS — G47.33 OSA ON CPAP: ICD-10-CM

## 2018-08-20 PROCEDURE — 95811 PR POLYSOMNOGRAPHY W/CPAP: ICD-10-PCS | Mod: 26,,, | Performed by: PSYCHIATRY & NEUROLOGY

## 2018-08-20 PROCEDURE — 95811 POLYSOM 6/>YRS CPAP 4/> PARM: CPT

## 2018-08-20 PROCEDURE — 95811 POLYSOM 6/>YRS CPAP 4/> PARM: CPT | Mod: 26,,, | Performed by: PSYCHIATRY & NEUROLOGY

## 2018-08-21 NOTE — PROGRESS NOTES
"Education was done via explanation of sleep study process and procedure. All questions were answered.    Pt tolerated CPAP well. Optimal pressure of 8 appeared to have eliminated most respiratory events.    Low sat of 86% was observed in study. EKG revealed rare PAC and bradycardia. Pt's own Medium Opus Nasal Pilllows were used during CPAP titration. Pt. does not wear chin strap at home. Chin strap is needed. Pt. response to titration in a.m. "It was easy"  Thank you letter was given in a.m.  "

## 2018-09-20 DIAGNOSIS — I48.0 PAROXYSMAL ATRIAL FIBRILLATION: Primary | ICD-10-CM

## 2018-09-21 ENCOUNTER — PATIENT MESSAGE (OUTPATIENT)
Dept: FAMILY MEDICINE | Facility: CLINIC | Age: 68
End: 2018-09-21

## 2018-09-26 ENCOUNTER — OFFICE VISIT (OUTPATIENT)
Dept: ELECTROPHYSIOLOGY | Facility: CLINIC | Age: 68
End: 2018-09-26
Payer: COMMERCIAL

## 2018-09-26 VITALS
DIASTOLIC BLOOD PRESSURE: 80 MMHG | HEIGHT: 73 IN | WEIGHT: 235 LBS | BODY MASS INDEX: 31.14 KG/M2 | SYSTOLIC BLOOD PRESSURE: 126 MMHG | HEART RATE: 52 BPM

## 2018-09-26 DIAGNOSIS — I48.0 PAROXYSMAL ATRIAL FIBRILLATION: Primary | ICD-10-CM

## 2018-09-26 DIAGNOSIS — E66.09 OBESITY DUE TO EXCESS CALORIES WITH SERIOUS COMORBIDITY, UNSPECIFIED CLASSIFICATION: Chronic | ICD-10-CM

## 2018-09-26 DIAGNOSIS — R00.2 PALPITATIONS: ICD-10-CM

## 2018-09-26 DIAGNOSIS — R00.1 BRADYCARDIA: ICD-10-CM

## 2018-09-26 PROCEDURE — 99999 PR PBB SHADOW E&M-EST. PATIENT-LVL III: CPT | Mod: PBBFAC,,, | Performed by: INTERNAL MEDICINE

## 2018-09-26 PROCEDURE — 93000 ELECTROCARDIOGRAM COMPLETE: CPT | Mod: S$GLB,,, | Performed by: INTERNAL MEDICINE

## 2018-09-26 PROCEDURE — 1101F PT FALLS ASSESS-DOCD LE1/YR: CPT | Mod: CPTII,S$GLB,, | Performed by: INTERNAL MEDICINE

## 2018-09-26 PROCEDURE — 99214 OFFICE O/P EST MOD 30 MIN: CPT | Mod: S$GLB,,, | Performed by: INTERNAL MEDICINE

## 2018-09-26 RX ORDER — FLECAINIDE ACETATE 150 MG/1
300 TABLET ORAL DAILY PRN
Qty: 20 TABLET | Refills: 6 | Status: SHIPPED | OUTPATIENT
Start: 2018-09-26 | End: 2019-09-30 | Stop reason: SDUPTHER

## 2018-09-26 NOTE — PROGRESS NOTES
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   H/o atrial fibrillation, first diagnosed in late 20's. Attributed to caffeine + lack of sleep.   No recurrence until 1/01. Was seeing Dr. Bolton. Ultimately converted spontaneously. His bp was elevated at that time, placed on Atacand, noted cough. Switched to beta blocker.   6/06 noted to have palpitations, work-up indicated PVC's.  Did well until 9/13, developed recurrence. Xarelto and beta blocker initiated >>  converted back to nsr. At f/u, as he was feeling well, and had CHADSVASc of 0, Xarelto was discontinued and aspirin 81 mg initiated.  Has not taken Flecainide since 2/16.  Feeling well overall.  ECG reveals nsr.      Review of Systems   Constitution: Negative. Negative for weakness and malaise/fatigue.   Cardiovascular: Negative for chest pain, dyspnea on exertion, irregular heartbeat, leg swelling, near-syncope, orthopnea, palpitations, paroxysmal nocturnal dyspnea and syncope.   Respiratory: Negative for cough and shortness of breath.    Neurological: Negative for dizziness and light-headedness.   All other systems reviewed and are negative.       Objective:    Physical Exam   Constitutional: He is oriented to person, place, and time. He appears well-developed and well-nourished.   Eyes: Conjunctivae are normal. No scleral icterus.   Neck: No JVD present. No tracheal deviation present.   Cardiovascular: Normal rate, regular rhythm and normal heart sounds. PMI is not displaced.   Pulmonary/Chest: Effort normal and breath sounds normal. No respiratory distress.   Abdominal: Soft. There is no hepatosplenomegaly. There is no tenderness.   Musculoskeletal: He exhibits no edema (lower extremity) or tenderness.   Neurological: He is alert and oriented to  person, place, and time.   Skin: Skin is warm and dry. No rash noted.   Psychiatric: He has a normal mood and affect. His behavior is normal.         Assessment:       1. Paroxysmal atrial fibrillation    2. Bradycardia    3. Palpitations    4. Obesity due to excess calories with serious comorbidity, unspecified classification         Plan:             Doing well overall.  CHADSVASc remains 1.  Given paucity of events and intermediate risk score, I endorsed deferring anticoagulation.  I agree with their obtaining an I watch and monitoring.  F/u in one year.

## 2018-10-01 ENCOUNTER — PATIENT MESSAGE (OUTPATIENT)
Dept: FAMILY MEDICINE | Facility: CLINIC | Age: 68
End: 2018-10-01

## 2018-10-01 DIAGNOSIS — G47.33 OSA (OBSTRUCTIVE SLEEP APNEA): Primary | ICD-10-CM

## 2018-10-01 NOTE — TELEPHONE ENCOUNTER
No problem,I can put a prescription in and have my nurse send it to the medical equipment supply place with [facility]     ===View-only below this line===      ----- Message -----     From: Franko Buchanan MD     Sent: 10/1/2018  8:31 AM CDT       To: Loyd Garcia MD  Subject: Non-Urgent Medical    Loyd, I would like to try using the APAP machine. Do you need to write a prescription or consult for this or should I just go to the [facility] on [facility]?  Thank you.    Adam Buchanan    Orders Placed This Encounter   Procedures    CPAP FOR HOME USE

## 2018-10-05 ENCOUNTER — PATIENT MESSAGE (OUTPATIENT)
Dept: FAMILY MEDICINE | Facility: CLINIC | Age: 68
End: 2018-10-05

## 2018-10-05 ENCOUNTER — TELEPHONE (OUTPATIENT)
Dept: FAMILY MEDICINE | Facility: CLINIC | Age: 68
End: 2018-10-05

## 2018-10-05 NOTE — TELEPHONE ENCOUNTER
----- Message from Janis Burr sent at 10/5/2018  3:08 PM CDT -----  Regarding: RE: Janis with [facility]  I will have to have the original sleep study and the notes from prior to the sleep study, or the patient will have to make a face to face appt and start the process over again.   ----- Message -----  From: Loyd Garcia MD  Sent: 10/5/2018   2:28 PM  To: Janis Burr  Subject: RE: Janis with [facility]                               He has a known history of sleep apnea.  His original sleep study was over 12 years ago at an outside facility and he was unable to to find these original reports.  He has currently been on a CPAP for years now but was noticing some decreased effectiveness and easier fatigue over the last several months leading to the evaluation via CPAP titration and consideration of auto Pap from 8-12    ----- Message -----  From: Janis Burr  Sent: 10/5/2018   2:09 PM  To: Loyd Garcia MD  Subject: Janis with [facility]                                   I received an order for an auto pap, but I am not able to find the sleep study, I am only seeing the titration study.  Please advise.

## 2018-11-05 ENCOUNTER — LAB VISIT (OUTPATIENT)
Dept: LAB | Facility: HOSPITAL | Age: 68
End: 2018-11-05
Attending: INTERNAL MEDICINE
Payer: COMMERCIAL

## 2018-11-05 ENCOUNTER — TELEPHONE (OUTPATIENT)
Dept: FAMILY MEDICINE | Facility: CLINIC | Age: 68
End: 2018-11-05

## 2018-11-05 DIAGNOSIS — M06.9 RHEUMATOID ARTHRITIS INVOLVING MULTIPLE JOINTS: ICD-10-CM

## 2018-11-05 DIAGNOSIS — R29.898 LEFT ARM WEAKNESS: ICD-10-CM

## 2018-11-05 DIAGNOSIS — M54.12 LEFT CERVICAL RADICULOPATHY: Primary | ICD-10-CM

## 2018-11-05 LAB
ALBUMIN SERPL BCP-MCNC: 4 G/DL
ALP SERPL-CCNC: 96 U/L
ALT SERPL W/O P-5'-P-CCNC: 34 U/L
ANION GAP SERPL CALC-SCNC: 8 MMOL/L
AST SERPL-CCNC: 43 U/L
BASOPHILS # BLD AUTO: 0.02 K/UL
BASOPHILS NFR BLD: 0.2 %
BILIRUB SERPL-MCNC: 0.8 MG/DL
BUN SERPL-MCNC: 15 MG/DL
CALCIUM SERPL-MCNC: 9.9 MG/DL
CHLORIDE SERPL-SCNC: 105 MMOL/L
CO2 SERPL-SCNC: 28 MMOL/L
CREAT SERPL-MCNC: 0.9 MG/DL
CRP SERPL-MCNC: 4.6 MG/L
DIFFERENTIAL METHOD: ABNORMAL
EOSINOPHIL # BLD AUTO: 0.2 K/UL
EOSINOPHIL NFR BLD: 2.6 %
ERYTHROCYTE [DISTWIDTH] IN BLOOD BY AUTOMATED COUNT: 14.8 %
ERYTHROCYTE [SEDIMENTATION RATE] IN BLOOD BY WESTERGREN METHOD: 20 MM/HR
EST. GFR  (AFRICAN AMERICAN): >60 ML/MIN/1.73 M^2
EST. GFR  (NON AFRICAN AMERICAN): >60 ML/MIN/1.73 M^2
GLUCOSE SERPL-MCNC: 72 MG/DL
HCT VFR BLD AUTO: 40.4 %
HGB BLD-MCNC: 13.4 G/DL
LYMPHOCYTES # BLD AUTO: 3.3 K/UL
LYMPHOCYTES NFR BLD: 38.2 %
MCH RBC QN AUTO: 29.4 PG
MCHC RBC AUTO-ENTMCNC: 33.2 G/DL
MCV RBC AUTO: 89 FL
MONOCYTES # BLD AUTO: 0.6 K/UL
MONOCYTES NFR BLD: 6.4 %
NEUTROPHILS # BLD AUTO: 4.5 K/UL
NEUTROPHILS NFR BLD: 52.4 %
PLATELET # BLD AUTO: 221 K/UL
PMV BLD AUTO: 9.8 FL
POTASSIUM SERPL-SCNC: 3.9 MMOL/L
PROT SERPL-MCNC: 8 G/DL
RBC # BLD AUTO: 4.56 M/UL
SODIUM SERPL-SCNC: 141 MMOL/L
WBC # BLD AUTO: 8.54 K/UL

## 2018-11-05 PROCEDURE — 85025 COMPLETE CBC W/AUTO DIFF WBC: CPT

## 2018-11-05 PROCEDURE — 36415 COLL VENOUS BLD VENIPUNCTURE: CPT

## 2018-11-05 PROCEDURE — 86140 C-REACTIVE PROTEIN: CPT

## 2018-11-05 PROCEDURE — 80053 COMPREHEN METABOLIC PANEL: CPT

## 2018-11-05 PROCEDURE — 85652 RBC SED RATE AUTOMATED: CPT

## 2018-11-05 RX ORDER — METHYLPREDNISOLONE 4 MG/1
TABLET ORAL
Qty: 1 PACKAGE | Refills: 0 | Status: SHIPPED | OUTPATIENT
Start: 2018-11-05 | End: 2018-11-20 | Stop reason: CLARIF

## 2018-11-05 NOTE — TELEPHONE ENCOUNTER
Patient walked in to the office, is concerned about left shoulder weakness which has been progressive.  Not much pain in the shoulder but he has pain in the trapezius along with paresthesias in the trapezius and down the left arm.  No recent trauma.  Does have a history of seronegative RA,.  Was concerned about possible cervical etiology.  Appointment with neuro surgery is pending but he was advised to get cervical spine MRI prior.  Eval shows supraspinatus weakness and infraspinatus weakness.  Negative impingement testing.  1+ biceps and brachioradialis reflexes on the left, 2+ on the right.  Positive Spurling's test on the left side.  Will order cervical spine MRI.  Medrol Dosepak sent to pharmacy.  Advised to follow up with Neurosurgery for consult after MRI as planned    Orders Placed This Encounter   Procedures    MRI Cervical Spine Without Contrast

## 2018-11-06 ENCOUNTER — TELEPHONE (OUTPATIENT)
Dept: ELECTROPHYSIOLOGY | Facility: CLINIC | Age: 68
End: 2018-11-06

## 2018-11-06 ENCOUNTER — HOSPITAL ENCOUNTER (OUTPATIENT)
Dept: RADIOLOGY | Facility: HOSPITAL | Age: 68
Discharge: HOME | End: 2018-11-06
Attending: FAMILY MEDICINE
Payer: COMMERCIAL

## 2018-11-06 ENCOUNTER — CLINICAL SUPPORT (OUTPATIENT)
Dept: LAB | Facility: HOSPITAL | Age: 68
End: 2018-11-06
Attending: FAMILY MEDICINE
Payer: COMMERCIAL

## 2018-11-06 ENCOUNTER — PATIENT MESSAGE (OUTPATIENT)
Dept: RHEUMATOLOGY | Facility: CLINIC | Age: 68
End: 2018-11-06

## 2018-11-06 DIAGNOSIS — R29.898 LEFT ARM WEAKNESS: ICD-10-CM

## 2018-11-06 DIAGNOSIS — M54.12 LEFT CERVICAL RADICULOPATHY: ICD-10-CM

## 2018-11-06 DIAGNOSIS — I48.0 PAROXYSMAL ATRIAL FIBRILLATION: ICD-10-CM

## 2018-11-06 DIAGNOSIS — I48.0 PAROXYSMAL ATRIAL FIBRILLATION: Primary | ICD-10-CM

## 2018-11-06 PROCEDURE — 72141 MRI NECK SPINE W/O DYE: CPT | Mod: 26,,, | Performed by: RADIOLOGY

## 2018-11-06 PROCEDURE — 93010 EKG 12-LEAD: ICD-10-PCS | Mod: ,,, | Performed by: INTERNAL MEDICINE

## 2018-11-06 PROCEDURE — 93010 ELECTROCARDIOGRAM REPORT: CPT | Mod: ,,, | Performed by: INTERNAL MEDICINE

## 2018-11-06 PROCEDURE — 93005 ELECTROCARDIOGRAM TRACING: CPT

## 2018-11-06 PROCEDURE — 72141 MRI NECK SPINE W/O DYE: CPT | Mod: TC

## 2018-11-06 NOTE — TELEPHONE ENCOUNTER
Reviewed EKG with Dr Simon. Patient is back in SR. Per Dr Simon, will continue to monitor for now, but in general if this would happen again he should take the Flecainide 300mg (one time dose) and give it about 6 hours. If he has not converted after 6 hours, he should contact the office and we can set up cardioversion. Left message for patient advising of recommendations and also advising to notify us for any increase in pattern/occurrence of AF. Call back # left.

## 2018-11-06 NOTE — TELEPHONE ENCOUNTER
"Patient call put through. Patient states he is in AF and took his "pill in the pocket" Flecainide 300mg at 2:15pm today. He wants to know how long he should wait before he needs to do anything else. He is scheduled for an MRI at 4pm today. Advised to get EKG prior to MRI (scheduled in [place] for 3:30pm). Advised that I will ask Dr Simon to review EKG and advise. [id number]. He is on aspirin and has not taken flecainide for AF since 2016. HR=70-90. He is not symptomatic, he can just tell he is out of rhythm. Will call him back with recommendations as soon as I speak with DR Simon.   "

## 2018-11-08 ENCOUNTER — PATIENT MESSAGE (OUTPATIENT)
Dept: FAMILY MEDICINE | Facility: CLINIC | Age: 68
End: 2018-11-08

## 2018-11-09 ENCOUNTER — TELEPHONE (OUTPATIENT)
Dept: SPINE | Facility: CLINIC | Age: 68
End: 2018-11-09

## 2018-11-09 NOTE — TELEPHONE ENCOUNTER
Spoke with wife of patient and states they had a conversation with Dr Hitchcock a few days ago and Dr Hitchcock wants Dr Buchanan added to his schedule for arm weakness.  Staff informed wife that message will be sent to Supervisor for further guidance.  Please advise.

## 2018-11-09 NOTE — TELEPHONE ENCOUNTER
----- Message from Gino Carcamo sent at 11/9/2018 11:16 AM CST -----  Contact: Patient @ [phone]  Patient is calling to schedule a f/u appt , pls call

## 2018-11-13 ENCOUNTER — TELEPHONE (OUTPATIENT)
Dept: SPINE | Facility: CLINIC | Age: 68
End: 2018-11-13

## 2018-11-13 ENCOUNTER — PATIENT MESSAGE (OUTPATIENT)
Dept: FAMILY MEDICINE | Facility: CLINIC | Age: 68
End: 2018-11-13

## 2018-11-13 DIAGNOSIS — M47.12 SPONDYLOSIS OF CERVICAL SPINE WITH MYELOPATHY: ICD-10-CM

## 2018-11-13 DIAGNOSIS — Z01.818 PRE-OP TESTING: Primary | ICD-10-CM

## 2018-11-13 DIAGNOSIS — M43.22 FUSION OF SPINE OF CERVICAL REGION: Primary | ICD-10-CM

## 2018-11-13 DIAGNOSIS — M54.12 RADICULOPATHY OF CERVICAL REGION: ICD-10-CM

## 2018-11-13 DIAGNOSIS — M50.20 HNP (HERNIATED NUCLEUS PULPOSUS), CERVICAL: Primary | ICD-10-CM

## 2018-11-13 NOTE — LETTER
November 15, 2018        No Recipients             [address]  [place] [address]  Phone: [phone]  Fax: [phone]   Patient: Franko Buchanan MD   MR Number: [id number]   YOB: 1950   Date of Visit: 11/13/2018       Dear Dr. 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  He is medically stable for upcoming spine surgery, and I have asked that he stop aspirin and all NSAIDs 1 week prior to his procedure.  You may reference my note in epic from August of 2018 for details on his medical exam.  I have ordered some supplementary preop testing as requested.  He does have already some testing on file within the month that should suffice for his upcoming surgery.    If you have questions, please do not hesitate to call me. I look forward to following Franko Buchanan along with you.    Sincerely,      Loyd Garcia MD            CC  No Recipients    Enclosure

## 2018-11-16 ENCOUNTER — PATIENT MESSAGE (OUTPATIENT)
Dept: FAMILY MEDICINE | Facility: CLINIC | Age: 68
End: 2018-11-16

## 2018-11-20 ENCOUNTER — ANESTHESIA EVENT (OUTPATIENT)
Dept: SURGERY | Facility: OTHER | Age: 68
DRG: 472 | End: 2018-11-20
Payer: COMMERCIAL

## 2018-11-20 ENCOUNTER — HOSPITAL ENCOUNTER (OUTPATIENT)
Dept: PREADMISSION TESTING | Facility: OTHER | Age: 68
Discharge: HOME | End: 2018-11-20
Attending: NEUROLOGICAL SURGERY
Payer: COMMERCIAL

## 2018-11-20 VITALS
WEIGHT: 225 LBS | SYSTOLIC BLOOD PRESSURE: 120 MMHG | HEART RATE: 56 BPM | DIASTOLIC BLOOD PRESSURE: 56 MMHG | HEIGHT: 73 IN | BODY MASS INDEX: 29.82 KG/M2 | OXYGEN SATURATION: 98 % | TEMPERATURE: 99 F | RESPIRATION RATE: 16 BRPM

## 2018-11-20 LAB
ABO + RH BLD: NORMAL
APTT BLDCRRT: 31.7 SEC
BLD GP AB SCN CELLS X3 SERPL QL: NORMAL
INR PPP: 1
PROTHROMBIN TIME: 10.7 SEC

## 2018-11-20 PROCEDURE — 86850 RBC ANTIBODY SCREEN: CPT

## 2018-11-20 PROCEDURE — 85610 PROTHROMBIN TIME: CPT

## 2018-11-20 PROCEDURE — 36415 COLL VENOUS BLD VENIPUNCTURE: CPT

## 2018-11-20 PROCEDURE — 85730 THROMBOPLASTIN TIME PARTIAL: CPT

## 2018-11-20 RX ORDER — OXYCODONE HYDROCHLORIDE 5 MG/1
5 TABLET ORAL ONCE AS NEEDED
Status: CANCELLED | OUTPATIENT
Start: 2018-11-20 | End: 2018-11-20

## 2018-11-20 RX ORDER — SODIUM CHLORIDE, SODIUM LACTATE, POTASSIUM CHLORIDE, CALCIUM CHLORIDE 600; 310; 30; 20 MG/100ML; MG/100ML; MG/100ML; MG/100ML
INJECTION, SOLUTION INTRAVENOUS CONTINUOUS
Status: CANCELLED | OUTPATIENT
Start: 2018-11-20

## 2018-11-20 RX ORDER — LIDOCAINE HYDROCHLORIDE 10 MG/ML
0.5 INJECTION, SOLUTION EPIDURAL; INFILTRATION; INTRACAUDAL; PERINEURAL ONCE
Status: CANCELLED | OUTPATIENT
Start: 2018-11-20 | End: 2018-11-20

## 2018-11-20 RX ORDER — FAMOTIDINE 20 MG/1
20 TABLET, FILM COATED ORAL
Status: CANCELLED | OUTPATIENT
Start: 2018-11-20 | End: 2018-11-20

## 2018-11-20 RX ORDER — MIDAZOLAM HYDROCHLORIDE 5 MG/ML
5 INJECTION INTRAMUSCULAR; INTRAVENOUS ONCE AS NEEDED
Status: CANCELLED | OUTPATIENT
Start: 2018-11-20 | End: 2018-11-20

## 2018-11-20 RX ORDER — FERROUS SULFATE 325(65) MG
325 TABLET, DELAYED RELEASE (ENTERIC COATED) ORAL
COMMUNITY

## 2018-11-20 NOTE — DISCHARGE INSTRUCTIONS
PRE-ADMIT TESTING -  [phone]    [address]  [place] [facility]          Your surgery has been scheduled at [facility]. We are pleased to have the opportunity to serve you. For Further Information please call [phone].    On the day of surgery please report to the Information Desk on the 1st floor.    · CONTACT YOUR PHYSICIAN'S OFFICE THE DAY PRIOR TO YOUR SURGERY TO OBTAIN YOUR ARRIVAL TIME.     · The evening before surgery do not eat anything after 9 p.m. ( this includes hard candy, chewing gum and mints).  You may only have GATORADE, POWERADE AND WATER  from 9 p.m. until you leave your home.   DO NOT DRINK ANY LIQUIDS ON THE WAY TO THE HOSPITAL.      SPECIAL MEDICATION INSTRUCTIONS: TAKE medications checked off by the Anesthesiologist on your Medication List.    Angiogram Patients: Take medications as instructed by your physician, including aspirin.     Surgery Patients:    If you take ASPIRIN - Your PHYSICIAN/SURGEON will need to inform you IF/OR when you need to stop taking aspirin prior to your surgery.     Do Not take any medications containing IBUPROFEN.  Do Not Wear any make-up or dark nail polish   (especially eye make-up) to surgery. If you come to surgery with makeup on you will be required to remove the makeup or nail polish.    Do not shave your surgical area at least 5 days prior to your surgery. The surgical prep will be performed at the hospital according to Infection Control regulations.    Leave all valuables at home.   Do Not wear any jewelry or watches, including any metal in body piercings.  Contact Lens must be removed before surgery. Either do not wear the contact lens or bring a case and solution for storage.  Please bring a container for eyeglasses or dentures as required.  Bring any paperwork your physician has provided, such as consent forms,  history and physicals, doctor's orders, etc.   Bring comfortable clothes that are loose fitting to wear upon  discharge. Take into consideration the type of surgery being performed.  Maintain your diet as advised per your physician the day prior to surgery.      Adequate rest the night before surgery is advised.   Park in the Parking lot behind the hospital or in the [facility] across the street from the parking lot. Parking is complimentary.  If you will be discharged the same day as your procedure, please arrange for a responsible adult to drive you home or to accompany you if traveling by taxi.   YOU WILL NOT BE PERMITTED TO DRIVE OR TO LEAVE THE HOSPITAL ALONE AFTER SURGERY.   It is strongly recommended that you arrange for someone to remain with you for the first 24 hrs following your surgery.       Thank you for your cooperation.  The Staff of [facility].        Bathing Instructions                                                                 Please shower the evening before and morning of your procedure with    ANTIBACTERIAL SOAP. ( DIAL, etc )  Concentrate on the surgical area   for at least 3 minutes and rinse completely. Dry off as usual.   Do not use any deodorant, powder, body lotions, perfume, after shave or    cologne.

## 2018-11-20 NOTE — ANESTHESIA PREPROCEDURE EVALUATION
11/20/2018  Franko Buchanan is a 68 y.o., male for colonoscopy    Anesthesia Evaluation    I have reviewed the Patient Summary Reports.    I have reviewed the Nursing Notes.   I have reviewed the Medications.     Review of Systems  Anesthesia Hx:  No problems with previous Anesthesia Denies Hx of Anesthetic complications    Social:  Non-Smoker, Social Alcohol Use    Hematology/Oncology:  Hematology Normal   Oncology Normal     EENT/Dental:EENT/Dental Normal   Cardiovascular:   Exercise tolerance: good Dysrhythmias atrial fibrillation    Pulmonary:   Sleep Apnea, CPAP    Renal/:  Renal/ Normal     Hepatic/GI:  Hepatic/GI Normal    Musculoskeletal:   Arthritis     Neurological:   Neuromuscular Disease, (cervical radiculopathy)    Endocrine:  Endocrine Normal    Dermatological:  Skin Normal    Psych:  Psychiatric Normal           Physical Exam  General:  Obesity    Airway/Jaw/Neck:  Airway Findings: Mouth Opening: Normal Tongue: Normal  General Airway Assessment: Adult  Mallampati: II  TM Distance: Normal, at least 6 cm  Jaw/Neck Findings:  Neck ROM: Normal ROM      Dental:  Dental Findings: In tact   Chest/Lungs:  Chest/Lungs Findings: Clear to auscultation, Normal Respiratory Rate     Heart/Vascular:  Heart Findings: Rate: Normal  Rhythm: Regular Rhythm  Sounds: Normal        Mental Status:  Mental Status Findings:  Alert and Oriented, Cooperative         Anesthesia Plan  Type of Anesthesia, risks & benefits discussed:  Anesthesia Type:  general  Patient's Preference:   Intra-op Monitoring Plan: standard ASA monitors  Intra-op Monitoring Plan Comments:   Post Op Pain Control Plan: multimodal analgesia  Post Op Pain Control Plan Comments:   Induction:   IV  Beta Blocker:         Informed Consent: Patient understands risks and agrees with Anesthesia plan.  Questions answered. Anesthesia consent signed with  patient.  ASA Score: 2     Day of Surgery Review of History & Physical: I have interviewed and examined the patient. I have reviewed the patient's H&P dated:    H&P update referred to the surgeon.     Anesthesia Plan Notes: Labs pending. Cleared by primary. Pt is an MD.        Ready For Surgery From Anesthesia Perspective.

## 2018-11-27 ENCOUNTER — HOSPITAL ENCOUNTER (OUTPATIENT)
Dept: RADIOLOGY | Facility: HOSPITAL | Age: 68
Discharge: HOME | End: 2018-11-27
Attending: FAMILY MEDICINE
Payer: COMMERCIAL

## 2018-11-27 ENCOUNTER — PATIENT MESSAGE (OUTPATIENT)
Dept: FAMILY MEDICINE | Facility: CLINIC | Age: 68
End: 2018-11-27

## 2018-11-27 ENCOUNTER — TELEPHONE (OUTPATIENT)
Dept: FAMILY MEDICINE | Facility: CLINIC | Age: 68
End: 2018-11-27

## 2018-11-27 ENCOUNTER — TELEPHONE (OUTPATIENT)
Dept: SPINE | Facility: CLINIC | Age: 68
End: 2018-11-27

## 2018-11-27 DIAGNOSIS — Z01.818 PRE-OP TESTING: ICD-10-CM

## 2018-11-27 PROCEDURE — 71046 X-RAY EXAM CHEST 2 VIEWS: CPT | Mod: 26,,, | Performed by: RADIOLOGY

## 2018-11-27 PROCEDURE — 71046 X-RAY EXAM CHEST 2 VIEWS: CPT | Mod: TC,FY

## 2018-11-27 NOTE — TELEPHONE ENCOUNTER
----- Message from Bo Richardson sent at 11/27/2018  3:45 PM CST -----  Contact: Pt  Needs Advice    Reason for call: Calling to speak with a nurse regarding a question he has before his surgery tomorrow.         Communication Preference: [phone]    Additional Information: N/A

## 2018-11-28 ENCOUNTER — HOSPITAL ENCOUNTER (INPATIENT)
Facility: OTHER | Age: 68
LOS: 1 days | Discharge: HOME | DRG: 472 | End: 2018-11-29
Attending: NEUROLOGICAL SURGERY | Admitting: NEUROLOGICAL SURGERY
Payer: COMMERCIAL

## 2018-11-28 ENCOUNTER — ANESTHESIA (OUTPATIENT)
Dept: SURGERY | Facility: OTHER | Age: 68
DRG: 472 | End: 2018-11-28
Payer: COMMERCIAL

## 2018-11-28 DIAGNOSIS — M54.12 CERVICAL RADICULOPATHY AT C7: Primary | ICD-10-CM

## 2018-11-28 DIAGNOSIS — M47.22 OSTEOARTHRITIS OF SPINE WITH RADICULOPATHY, CERVICAL REGION: ICD-10-CM

## 2018-11-28 DIAGNOSIS — M47.9 SPONDYLOSIS: ICD-10-CM

## 2018-11-28 DIAGNOSIS — M48.02 CERVICAL STENOSIS OF SPINAL CANAL: ICD-10-CM

## 2018-11-28 DIAGNOSIS — R29.898 WEAKNESS OF BOTH ARMS: ICD-10-CM

## 2018-11-28 PROCEDURE — 99900035 HC TECH TIME PER 15 MIN (STAT)

## 2018-11-28 PROCEDURE — 25000003 PHARM REV CODE 250: Performed by: ANESTHESIOLOGY

## 2018-11-28 PROCEDURE — 37000008 HC ANESTHESIA 1ST 15 MINUTES: Performed by: NEUROLOGICAL SURGERY

## 2018-11-28 PROCEDURE — 25000003 PHARM REV CODE 250: Performed by: STUDENT IN AN ORGANIZED HEALTH CARE EDUCATION/TRAINING PROGRAM

## 2018-11-28 PROCEDURE — 20931 SP BONE ALGRFT STRUCT ADD-ON: CPT | Mod: ,,, | Performed by: NEUROLOGICAL SURGERY

## 2018-11-28 PROCEDURE — 27000190 HC CPAP FULL FACE MASK W/VALVE

## 2018-11-28 PROCEDURE — 22551 ARTHRD ANT NTRBDY CERVICAL: CPT | Mod: ,,, | Performed by: NEUROLOGICAL SURGERY

## 2018-11-28 PROCEDURE — 63600175 PHARM REV CODE 636 W HCPCS: Performed by: NURSE ANESTHETIST, CERTIFIED REGISTERED

## 2018-11-28 PROCEDURE — C1713 ANCHOR/SCREW BN/BN,TIS/BN: HCPCS | Performed by: NEUROLOGICAL SURGERY

## 2018-11-28 PROCEDURE — 25000003 PHARM REV CODE 250: Performed by: SPECIALIST

## 2018-11-28 PROCEDURE — 11000001 HC ACUTE MED/SURG PRIVATE ROOM

## 2018-11-28 PROCEDURE — 94660 CPAP INITIATION&MGMT: CPT

## 2018-11-28 PROCEDURE — 94799 UNLISTED PULMONARY SVC/PX: CPT

## 2018-11-28 PROCEDURE — 01N10ZZ RELEASE CERVICAL NERVE, OPEN APPROACH: ICD-10-PCS | Performed by: NEUROLOGICAL SURGERY

## 2018-11-28 PROCEDURE — 27201423 OPTIME MED/SURG SUP & DEVICES STERILE SUPPLY: Performed by: NEUROLOGICAL SURGERY

## 2018-11-28 PROCEDURE — 0RG20K0 FUSION OF 2 OR MORE CERVICAL VERTEBRAL JOINTS WITH NONAUTOLOGOUS TISSUE SUBSTITUTE, ANTERIOR APPROACH, ANTERIOR COLUMN, OPEN APPROACH: ICD-10-PCS | Performed by: NEUROLOGICAL SURGERY

## 2018-11-28 PROCEDURE — 97161 PT EVAL LOW COMPLEX 20 MIN: CPT

## 2018-11-28 PROCEDURE — 25000003 PHARM REV CODE 250: Performed by: NURSE ANESTHETIST, CERTIFIED REGISTERED

## 2018-11-28 PROCEDURE — 94761 N-INVAS EAR/PLS OXIMETRY MLT: CPT

## 2018-11-28 PROCEDURE — 0RT30ZZ RESECTION OF CERVICAL VERTEBRAL DISC, OPEN APPROACH: ICD-10-PCS | Performed by: NEUROLOGICAL SURGERY

## 2018-11-28 PROCEDURE — 71000033 HC RECOVERY, INTIAL HOUR: Performed by: NEUROLOGICAL SURGERY

## 2018-11-28 PROCEDURE — 25000003 PHARM REV CODE 250: Performed by: NEUROLOGICAL SURGERY

## 2018-11-28 PROCEDURE — 63600175 PHARM REV CODE 636 W HCPCS: Performed by: NEUROLOGICAL SURGERY

## 2018-11-28 PROCEDURE — 63600175 PHARM REV CODE 636 W HCPCS: Performed by: STUDENT IN AN ORGANIZED HEALTH CARE EDUCATION/TRAINING PROGRAM

## 2018-11-28 PROCEDURE — P9045 ALBUMIN (HUMAN), 5%, 250 ML: HCPCS | Mod: JG | Performed by: NURSE ANESTHETIST, CERTIFIED REGISTERED

## 2018-11-28 PROCEDURE — 22845 INSERT SPINE FIXATION DEVICE: CPT | Mod: ,,, | Performed by: NEUROLOGICAL SURGERY

## 2018-11-28 PROCEDURE — 63600175 PHARM REV CODE 636 W HCPCS: Performed by: SPECIALIST

## 2018-11-28 PROCEDURE — 36000711: Performed by: NEUROLOGICAL SURGERY

## 2018-11-28 PROCEDURE — 27000221 HC OXYGEN, UP TO 24 HOURS

## 2018-11-28 PROCEDURE — 22552 ARTHRD ANT NTRBD CERVICAL EA: CPT | Mod: ,,, | Performed by: NEUROLOGICAL SURGERY

## 2018-11-28 PROCEDURE — 37000009 HC ANESTHESIA EA ADD 15 MINS: Performed by: NEUROLOGICAL SURGERY

## 2018-11-28 PROCEDURE — 36000710: Performed by: NEUROLOGICAL SURGERY

## 2018-11-28 PROCEDURE — 4A11X4G MONITORING OF PERIPHERAL NERVOUS ELECTRICAL ACTIVITY, INTRAOPERATIVE, EXTERNAL APPROACH: ICD-10-PCS | Performed by: NEUROLOGICAL SURGERY

## 2018-11-28 PROCEDURE — C1729 CATH, DRAINAGE: HCPCS | Performed by: NEUROLOGICAL SURGERY

## 2018-11-28 PROCEDURE — 71000039 HC RECOVERY, EACH ADD'L HOUR: Performed by: NEUROLOGICAL SURGERY

## 2018-11-28 PROCEDURE — 27800903 OPTIME MED/SURG SUP & DEVICES OTHER IMPLANTS: Performed by: NEUROLOGICAL SURGERY

## 2018-11-28 DEVICE — SCREW BONE SPINAL VA 4.2X14MM: Type: IMPLANTABLE DEVICE | Site: SPINE CERVICAL | Status: FUNCTIONAL

## 2018-11-28 DEVICE — IMPLANTABLE DEVICE: Type: IMPLANTABLE DEVICE | Site: SPINE CERVICAL | Status: FUNCTIONAL

## 2018-11-28 RX ORDER — ALBUMIN HUMAN 50 G/1000ML
SOLUTION INTRAVENOUS CONTINUOUS PRN
Status: DISCONTINUED | OUTPATIENT
Start: 2018-11-28 | End: 2018-11-28

## 2018-11-28 RX ORDER — ACETAMINOPHEN 325 MG/1
650 TABLET ORAL EVERY 6 HOURS PRN
Status: DISCONTINUED | OUTPATIENT
Start: 2018-11-28 | End: 2018-11-29 | Stop reason: HOSPADM

## 2018-11-28 RX ORDER — FAMOTIDINE 20 MG/1
20 TABLET, FILM COATED ORAL
Status: COMPLETED | OUTPATIENT
Start: 2018-11-28 | End: 2018-11-28

## 2018-11-28 RX ORDER — OXYCODONE HYDROCHLORIDE 5 MG/1
5 TABLET ORAL ONCE AS NEEDED
Status: DISCONTINUED | OUTPATIENT
Start: 2018-11-28 | End: 2018-11-28 | Stop reason: SDUPTHER

## 2018-11-28 RX ORDER — ACETAMINOPHEN 325 MG/1
650 TABLET ORAL EVERY 4 HOURS PRN
Status: DISCONTINUED | OUTPATIENT
Start: 2018-11-28 | End: 2018-11-29 | Stop reason: HOSPADM

## 2018-11-28 RX ORDER — OXYCODONE HYDROCHLORIDE 5 MG/1
5 TABLET ORAL
Status: DISCONTINUED | OUTPATIENT
Start: 2018-11-28 | End: 2018-11-28 | Stop reason: HOSPADM

## 2018-11-28 RX ORDER — FLECAINIDE ACETATE 100 MG/1
300 TABLET ORAL DAILY
Status: DISCONTINUED | OUTPATIENT
Start: 2018-11-28 | End: 2018-11-29 | Stop reason: HOSPADM

## 2018-11-28 RX ORDER — PROPOFOL 10 MG/ML
VIAL (ML) INTRAVENOUS
Status: DISCONTINUED | OUTPATIENT
Start: 2018-11-28 | End: 2018-11-28

## 2018-11-28 RX ORDER — EPHEDRINE SULFATE 50 MG/ML
INJECTION, SOLUTION INTRAVENOUS
Status: DISCONTINUED | OUTPATIENT
Start: 2018-11-28 | End: 2018-11-28

## 2018-11-28 RX ORDER — HYDROMORPHONE HYDROCHLORIDE 2 MG/ML
0.4 INJECTION, SOLUTION INTRAMUSCULAR; INTRAVENOUS; SUBCUTANEOUS EVERY 5 MIN PRN
Status: DISCONTINUED | OUTPATIENT
Start: 2018-11-28 | End: 2018-11-28 | Stop reason: HOSPADM

## 2018-11-28 RX ORDER — BACITRACIN 50000 [IU]/1
INJECTION, POWDER, FOR SOLUTION INTRAMUSCULAR
Status: DISCONTINUED | OUTPATIENT
Start: 2018-11-28 | End: 2018-11-28 | Stop reason: HOSPADM

## 2018-11-28 RX ORDER — LIDOCAINE HCL/PF 100 MG/5ML
SYRINGE (ML) INTRAVENOUS
Status: DISCONTINUED | OUTPATIENT
Start: 2018-11-28 | End: 2018-11-28

## 2018-11-28 RX ORDER — OXYCODONE AND ACETAMINOPHEN 10; 325 MG/1; MG/1
1 TABLET ORAL EVERY 4 HOURS PRN
Status: DISCONTINUED | OUTPATIENT
Start: 2018-11-28 | End: 2018-11-29 | Stop reason: HOSPADM

## 2018-11-28 RX ORDER — DULOXETIN HYDROCHLORIDE 30 MG/1
60 CAPSULE, DELAYED RELEASE ORAL DAILY
Status: DISCONTINUED | OUTPATIENT
Start: 2018-11-28 | End: 2018-11-29 | Stop reason: HOSPADM

## 2018-11-28 RX ORDER — SODIUM CHLORIDE 0.9 % (FLUSH) 0.9 %
3 SYRINGE (ML) INJECTION
Status: DISCONTINUED | OUTPATIENT
Start: 2018-11-28 | End: 2018-11-28

## 2018-11-28 RX ORDER — MEPERIDINE HYDROCHLORIDE 50 MG/ML
12.5 INJECTION INTRAMUSCULAR; INTRAVENOUS; SUBCUTANEOUS ONCE AS NEEDED
Status: DISCONTINUED | OUTPATIENT
Start: 2018-11-28 | End: 2018-11-28 | Stop reason: HOSPADM

## 2018-11-28 RX ORDER — MUPIROCIN 20 MG/G
OINTMENT TOPICAL
Status: DISCONTINUED | OUTPATIENT
Start: 2018-11-28 | End: 2018-11-28

## 2018-11-28 RX ORDER — MORPHINE SULFATE 10 MG/ML
2 INJECTION INTRAMUSCULAR; INTRAVENOUS; SUBCUTANEOUS EVERY 4 HOURS PRN
Status: DISCONTINUED | OUTPATIENT
Start: 2018-11-28 | End: 2018-11-29 | Stop reason: HOSPADM

## 2018-11-28 RX ORDER — AMOXICILLIN 250 MG
2 CAPSULE ORAL NIGHTLY PRN
Status: DISCONTINUED | OUTPATIENT
Start: 2018-11-28 | End: 2018-11-29 | Stop reason: HOSPADM

## 2018-11-28 RX ORDER — ONDANSETRON 8 MG/1
8 TABLET, ORALLY DISINTEGRATING ORAL EVERY 6 HOURS PRN
Status: DISCONTINUED | OUTPATIENT
Start: 2018-11-28 | End: 2018-11-29 | Stop reason: HOSPADM

## 2018-11-28 RX ORDER — METOPROLOL SUCCINATE 25 MG/1
25 TABLET, EXTENDED RELEASE ORAL DAILY
Status: DISCONTINUED | OUTPATIENT
Start: 2018-11-28 | End: 2018-11-29 | Stop reason: HOSPADM

## 2018-11-28 RX ORDER — ROCURONIUM BROMIDE 10 MG/ML
INJECTION, SOLUTION INTRAVENOUS
Status: DISCONTINUED | OUTPATIENT
Start: 2018-11-28 | End: 2018-11-28

## 2018-11-28 RX ORDER — SODIUM CHLORIDE 9 MG/ML
INJECTION, SOLUTION INTRAVENOUS CONTINUOUS
Status: DISCONTINUED | OUTPATIENT
Start: 2018-11-28 | End: 2018-11-29 | Stop reason: HOSPADM

## 2018-11-28 RX ORDER — MUPIROCIN 20 MG/G
1 OINTMENT TOPICAL
Status: DISCONTINUED | OUTPATIENT
Start: 2018-11-28 | End: 2018-11-28

## 2018-11-28 RX ORDER — DIAZEPAM 5 MG/ML
5 INJECTION, SOLUTION INTRAMUSCULAR; INTRAVENOUS EVERY 8 HOURS
Status: ACTIVE | OUTPATIENT
Start: 2018-11-28 | End: 2018-11-28

## 2018-11-28 RX ORDER — ONDANSETRON 2 MG/ML
4 INJECTION INTRAMUSCULAR; INTRAVENOUS DAILY PRN
Status: DISCONTINUED | OUTPATIENT
Start: 2018-11-28 | End: 2018-11-28 | Stop reason: HOSPADM

## 2018-11-28 RX ORDER — SODIUM CHLORIDE 9 MG/ML
INJECTION, SOLUTION INTRAVENOUS CONTINUOUS
Status: DISCONTINUED | OUTPATIENT
Start: 2018-11-28 | End: 2018-11-28

## 2018-11-28 RX ORDER — LIDOCAINE HYDROCHLORIDE 10 MG/ML
0.5 INJECTION, SOLUTION EPIDURAL; INFILTRATION; INTRACAUDAL; PERINEURAL ONCE
Status: DISCONTINUED | OUTPATIENT
Start: 2018-11-28 | End: 2018-11-28

## 2018-11-28 RX ORDER — MAG HYDROX/ALUMINUM HYD/SIMETH 200-200-20
30 SUSPENSION, ORAL (FINAL DOSE FORM) ORAL EVERY 4 HOURS PRN
Status: DISCONTINUED | OUTPATIENT
Start: 2018-11-28 | End: 2018-11-29 | Stop reason: HOSPADM

## 2018-11-28 RX ORDER — CEFAZOLIN SODIUM 1 G/50ML
2 SOLUTION INTRAVENOUS
Status: COMPLETED | OUTPATIENT
Start: 2018-11-28 | End: 2018-11-28

## 2018-11-28 RX ORDER — GLYCOPYRROLATE 0.2 MG/ML
INJECTION INTRAMUSCULAR; INTRAVENOUS
Status: DISCONTINUED | OUTPATIENT
Start: 2018-11-28 | End: 2018-11-28

## 2018-11-28 RX ORDER — MIDAZOLAM HYDROCHLORIDE 5 MG/ML
5 INJECTION INTRAMUSCULAR; INTRAVENOUS ONCE AS NEEDED
Status: COMPLETED | OUTPATIENT
Start: 2018-11-28 | End: 2018-11-28

## 2018-11-28 RX ORDER — METHYLPREDNISOLONE ACETATE 40 MG/ML
INJECTION, SUSPENSION INTRA-ARTICULAR; INTRALESIONAL; INTRAMUSCULAR; SOFT TISSUE
Status: DISCONTINUED | OUTPATIENT
Start: 2018-11-28 | End: 2018-11-28 | Stop reason: HOSPADM

## 2018-11-28 RX ORDER — SUCCINYLCHOLINE CHLORIDE 20 MG/ML
INJECTION INTRAMUSCULAR; INTRAVENOUS
Status: DISCONTINUED | OUTPATIENT
Start: 2018-11-28 | End: 2018-11-28

## 2018-11-28 RX ORDER — FENTANYL CITRATE 50 UG/ML
25 INJECTION, SOLUTION INTRAMUSCULAR; INTRAVENOUS EVERY 5 MIN PRN
Status: DISCONTINUED | OUTPATIENT
Start: 2018-11-28 | End: 2018-11-28 | Stop reason: HOSPADM

## 2018-11-28 RX ORDER — BUPIVACAINE HYDROCHLORIDE AND EPINEPHRINE 5; 5 MG/ML; UG/ML
INJECTION, SOLUTION EPIDURAL; INTRACAUDAL; PERINEURAL
Status: DISCONTINUED | OUTPATIENT
Start: 2018-11-28 | End: 2018-11-28 | Stop reason: HOSPADM

## 2018-11-28 RX ORDER — DIAZEPAM 5 MG/1
5 TABLET ORAL EVERY 8 HOURS PRN
Status: DISCONTINUED | OUTPATIENT
Start: 2018-11-28 | End: 2018-11-29 | Stop reason: HOSPADM

## 2018-11-28 RX ORDER — PHENYLEPHRINE HYDROCHLORIDE 10 MG/ML
INJECTION INTRAVENOUS
Status: DISCONTINUED | OUTPATIENT
Start: 2018-11-28 | End: 2018-11-28

## 2018-11-28 RX ORDER — FENTANYL CITRATE 50 UG/ML
INJECTION, SOLUTION INTRAMUSCULAR; INTRAVENOUS
Status: DISCONTINUED | OUTPATIENT
Start: 2018-11-28 | End: 2018-11-28

## 2018-11-28 RX ORDER — SODIUM CHLORIDE, SODIUM LACTATE, POTASSIUM CHLORIDE, CALCIUM CHLORIDE 600; 310; 30; 20 MG/100ML; MG/100ML; MG/100ML; MG/100ML
INJECTION, SOLUTION INTRAVENOUS CONTINUOUS
Status: DISCONTINUED | OUTPATIENT
Start: 2018-11-28 | End: 2018-11-28

## 2018-11-28 RX ORDER — LIDOCAINE HYDROCHLORIDE AND EPINEPHRINE 5; 5 MG/ML; UG/ML
INJECTION, SOLUTION INFILTRATION; PERINEURAL
Status: DISCONTINUED | OUTPATIENT
Start: 2018-11-28 | End: 2018-11-28 | Stop reason: HOSPADM

## 2018-11-28 RX ORDER — BISACODYL 10 MG
10 SUPPOSITORY, RECTAL RECTAL DAILY
Status: DISCONTINUED | OUTPATIENT
Start: 2018-11-28 | End: 2018-11-29 | Stop reason: HOSPADM

## 2018-11-28 RX ORDER — MUPIROCIN 20 MG/G
1 OINTMENT TOPICAL 2 TIMES DAILY
Status: DISCONTINUED | OUTPATIENT
Start: 2018-11-28 | End: 2018-11-29 | Stop reason: HOSPADM

## 2018-11-28 RX ADMIN — EPHEDRINE SULFATE 15 MG: 50 INJECTION INTRAMUSCULAR; INTRAVENOUS; SUBCUTANEOUS at 09:11

## 2018-11-28 RX ADMIN — PHENYLEPHRINE HYDROCHLORIDE 200 MCG: 10 INJECTION INTRAVENOUS at 09:11

## 2018-11-28 RX ADMIN — ONDANSETRON 8 MG: 8 TABLET, ORALLY DISINTEGRATING ORAL at 03:11

## 2018-11-28 RX ADMIN — FENTANYL CITRATE 50 MCG: 50 INJECTION, SOLUTION INTRAMUSCULAR; INTRAVENOUS at 08:11

## 2018-11-28 RX ADMIN — SUCCINYLCHOLINE CHLORIDE 140 MG: 20 INJECTION, SOLUTION INTRAMUSCULAR; INTRAVENOUS at 08:11

## 2018-11-28 RX ADMIN — CARBOXYMETHYLCELLULOSE SODIUM 2 DROP: 2.5 SOLUTION/ DROPS OPHTHALMIC at 08:11

## 2018-11-28 RX ADMIN — CEFAZOLIN SODIUM 2 G: 1 SOLUTION INTRAVENOUS at 08:11

## 2018-11-28 RX ADMIN — OXYCODONE HYDROCHLORIDE AND ACETAMINOPHEN 1 TABLET: 10; 325 TABLET ORAL at 03:11

## 2018-11-28 RX ADMIN — PHENYLEPHRINE HYDROCHLORIDE 100 MCG: 10 INJECTION INTRAVENOUS at 09:11

## 2018-11-28 RX ADMIN — GLYCOPYRROLATE 0.2 MG: 0.2 INJECTION, SOLUTION INTRAMUSCULAR; INTRAVENOUS at 09:11

## 2018-11-28 RX ADMIN — SODIUM CHLORIDE: 0.9 INJECTION, SOLUTION INTRAVENOUS at 01:11

## 2018-11-28 RX ADMIN — ALBUMIN (HUMAN): 2.5 SOLUTION INTRAVENOUS at 09:11

## 2018-11-28 RX ADMIN — EPHEDRINE SULFATE 10 MG: 50 INJECTION INTRAMUSCULAR; INTRAVENOUS; SUBCUTANEOUS at 09:11

## 2018-11-28 RX ADMIN — MIDAZOLAM HYDROCHLORIDE 5 MG: 5 INJECTION, SOLUTION INTRAMUSCULAR; INTRAVENOUS at 07:11

## 2018-11-28 RX ADMIN — LIDOCAINE HYDROCHLORIDE 50 MG: 20 INJECTION, SOLUTION INTRAVENOUS at 08:11

## 2018-11-28 RX ADMIN — FAMOTIDINE 20 MG: 20 TABLET ORAL at 07:11

## 2018-11-28 RX ADMIN — OXYCODONE HYDROCHLORIDE 5 MG: 5 TABLET ORAL at 12:11

## 2018-11-28 RX ADMIN — PROPOFOL 40 MG: 10 INJECTION, EMULSION INTRAVENOUS at 10:11

## 2018-11-28 RX ADMIN — OXYCODONE HYDROCHLORIDE AND ACETAMINOPHEN 1 TABLET: 10; 325 TABLET ORAL at 06:11

## 2018-11-28 RX ADMIN — PROPOFOL 200 MG: 10 INJECTION, EMULSION INTRAVENOUS at 08:11

## 2018-11-28 RX ADMIN — FENTANYL CITRATE 50 MCG: 50 INJECTION, SOLUTION INTRAMUSCULAR; INTRAVENOUS at 10:11

## 2018-11-28 RX ADMIN — SODIUM CHLORIDE, SODIUM LACTATE, POTASSIUM CHLORIDE, AND CALCIUM CHLORIDE: 600; 310; 30; 20 INJECTION, SOLUTION INTRAVENOUS at 08:11

## 2018-11-28 RX ADMIN — PHENYLEPHRINE HYDROCHLORIDE 0.3 MCG/KG/MIN: 10 INJECTION INTRAVENOUS at 09:11

## 2018-11-28 RX ADMIN — MUPIROCIN 1 G: 20 OINTMENT TOPICAL at 08:11

## 2018-11-28 RX ADMIN — MORPHINE SULFATE 2 MG: 10 INJECTION INTRAVENOUS at 08:11

## 2018-11-28 RX ADMIN — SODIUM CHLORIDE, SODIUM LACTATE, POTASSIUM CHLORIDE, AND CALCIUM CHLORIDE: 600; 310; 30; 20 INJECTION, SOLUTION INTRAVENOUS at 09:11

## 2018-11-28 RX ADMIN — PROPOFOL 100 MG: 10 INJECTION, EMULSION INTRAVENOUS at 08:11

## 2018-11-28 RX ADMIN — ROCURONIUM BROMIDE 10 MG: 10 INJECTION, SOLUTION INTRAVENOUS at 08:11

## 2018-11-28 NOTE — PROGRESS NOTES
Pt is able to move and feel touch to upper extremities a,d to both lower extremities.   Pt denies any pain or discomfort at this time.   Vital signs are with ion normal range.   Pt on 2 liter on 02 @ %

## 2018-11-28 NOTE — PROGRESS NOTES
Pt arrived to [facility] with oral airway in place.   Pt is sleeping in between care.   Vital signs are within normal range.

## 2018-11-28 NOTE — PROGRESS NOTES
Ok for pt to get X-ray performed once pt got to hospital floor/room.  Ok to transfer pt to floor before giving x-ray.   X-ray can be performed within 24hours of sx.   Per MD Lito Mac

## 2018-11-28 NOTE — NURSING
Pt arrived to floor via stretcher with BHARATH Lorenzo and transferred self to bed. Vs taken and stable on 2L NC and afebrile. Patient voided 400 cc after catheter was removed from OR per BHARATH Lorenzo report. IVF started, SCDs applied, oriented to room, call light placed within reach, bed low and locked, and family at bedside. Pt complains of pain 3/10. Incisions noted to neck, CDI. [place] brace to neck in place. No acute distress noted at this time. Will continue to monitor.

## 2018-11-28 NOTE — PLAN OF CARE
Problem: Patient Care Overview  Goal: Plan of Care Review  Outcome: Ongoing (interventions implemented as appropriate)  AAOX4,  VSS on RA and afebrile. Ambulated room with PT. Up in chair at this shift. Tolerated ordered diet. [place] ANUSHKA neck brace in place PRN for comfort. Wife at bedside. Purposely rounding performed. Will continue to monitor.

## 2018-11-28 NOTE — H&P
History and Physical  Neurosurgery    Admit Date: 11/28/2018  LOS: 0    Code Status: No Order     CC: <principal problem not specified>    SUBJECTIVE:     History of Present Illness: 69 yo male presenting for elective ACDF C4-C6.     Pt is neurologically stable on exam. Complaints of C5-C6 radiculopathic pain.           OBJECTIVE:   Vital Signs (Most Recent):   Temp: 97.4 °F (36.3 °C) (11/28/18 0653)  Pulse: (!) 59 (11/28/18 0653)  Resp: 16 (11/28/18 0653)  BP: 136/76 (11/28/18 0653)  SpO2: 97 % (11/28/18 0653)    Vital Signs (24h Range):   Temp:  [97.4 °F (36.3 °C)] 97.4 °F (36.3 °C)  Pulse:  [59] 59  Resp:  [16] 16  SpO2:  [97 %] 97 %  BP: (136)/(76) 136/76      I & O (Last 24h):  No intake or output data in the 24 hours ending 11/28/18 0714    Physical Exam:  General: well developed, well nourished, no distress.   Head: normocephalic, atraumatic  Cervical Spine: No midline tenderness to palpation.  Thoracolumbosacral Spine: No midline tenderness to palpation.  GCS: Motor: 6/Verbal: 5/Eyes: 4 GCS Total: 15  Mental Status: Awake, Alert, Oriented x 4  Language: No aphasia  Speech: No dysarthria  Facial Droop: None   Cranial nerves: CN III-XII grossly intact.  Visual Fields: Intact.   Eyes: Pupils equal and reactive to light. Intact Conjugate horizontal and vertical pursuit. No nystagmus. No gaze deviation.   Pulmonary: No distress.  Sensory: No deficit.  Propioception: No deficit in 1st digit of toe bilaterally.  Rectal Tone: Not tested.  Drift: None.  Upper Extremity Ataxia: No Dysmetria Bilaterally  Lower Extremity Ataxia: Not tested.  Dysdiadochokinesia: Not tested.  Reflexes: 2+ patellar bilaterally.  Forman: Absent  Clonus: Absent  Babinski: Absent  Romberg: Not Tested  Pulses: Brisk and symmetric radial, DP and tibial pulses.  Motor Strength:    Strength  Shoulder Abduction Elbow Extension Elbow Flexion Wrist Extension Wrist Flexion Finger Opposition Finger Add Finger Abd   Upper: R 5/5 5/5 5/5 5/5 5/5 5/5  5/5 5/5    L 5/5 5/5 5/5 5/5 5/5 5/5 5/5 5/5     Hip Flexion Knee Extension Knee  Flexion Ankle Dflexion Ankle Pflexion EHL     Lower: R 5/5 5/5 5/5 5/5 5/5 5/5      L 5/5 5/5 5/5 5/5 5/5 5/5           Lines/Drains/Airway:        Airway - Non-Surgical 11/28/18 0649 Endotracheal Tube (Active)           Nutrition/Tube Feeds:   Current Diet Order   Procedures    Diet NPO Except for: Medication     Order Specific Question:   Except for     Answer:   Medication       Labs:  ABG: No results for input(s): PH, PO2, PCO2, HCO3, POCSATURATED, BE in the last 24 hours.  BMP:No results for input(s): NA, K, CL, CO2, BUN, CREATININE, GLU, MG, PHOS in the last 24 hours.  LFT:   Lab Results   Component Value Date    AST 43 (H) 11/05/2018    ALT 34 11/05/2018    ALKPHOS 96 11/05/2018    BILITOT 0.8 11/05/2018    ALBUMIN 4.0 11/05/2018    PROT 8.0 11/05/2018     CBC:   Lab Results   Component Value Date    WBC 8.54 11/05/2018    HGB 13.4 (L) 11/05/2018    HCT 40.4 11/05/2018    MCV 89 11/05/2018     11/05/2018     Microbiology x 7d:   Microbiology Results (last 7 days)     ** No results found for the last 168 hours. **            ASSESSMENT/PLAN:   69 yo male presenting for elective cervical decompression and fusion.    --To OR for ACDF C4-C6.      Lito Mac

## 2018-11-28 NOTE — PROGRESS NOTES
Oral airway out :12:11  Pt is on 2-3 liter @ 95-96.   Pt is sleeping in between care and denies any pain or discomfort at this time.

## 2018-11-28 NOTE — PT/OT/SLP EVAL
"Physical Therapy Evaluation    Patient Name:  Franko Buchanan   MRN:  [id number]    Recommendations:     Discharge Recommendations:  outpatient PT   Discharge Equipment Recommendations: (Likely none)   Barriers to discharge: None    Assessment:     Franko Buchanan is a 68 y.o. male admitted with a medical diagnosis of Spondylosis.  He presents with the following impairments/functional limitations:  orthopedic precautions, decreased upper extremity function(baseline UE impairments).    Patient evaluated by PT. Good participation in mobility including gait approximately 25 ft with CGA and no AD. Further activity deferred due to patient transport for imaging. PT will continue to follow and progress as tolerated with focus on gait, stair training, and [place] J donning/doffing tomorrow.      Recommend outpatient PT when cleared by surgeon.    Rehab Prognosis:  Good; patient would benefit from acute skilled PT services to address these deficits and reach maximum level of function.      Recent Surgery: Procedure(s) (LRB):  C4-5 and C5-6 ACDF (Right) Day of Surgery    Plan:     During this hospitalization, patient to be seen daily to address the above listed problems via gait training, therapeutic activities, therapeutic exercises, neuromuscular re-education  · Plan of Care Expires:  12/28/18   Plan of Care Reviewed with: patient, spouse(Simultaneous filing. User may not have seen previous data.)    Subjective     Communicated with RN prior to session.  Patient found transitioning from supine to sitting with spouse to use urinal upon PT entry to room. PT stepped out while patient was using urinal per pt and spouse preference.      Chief Complaint: "soreness"  Patient comments/goals: agreeable to walk to stretcher  Pain/Comfort:  · Pain Rating 1: ("soreness" at surgical site before and after session)  · Location 1: neck  · Pain Addressed 1: Cessation of Activity    Patients cultural, spiritual, Restorationist conflicts given the " "current situation: none specified    Living Environment:  · Per patient and spouse: Pt lives with spouse in a 1 story house with 2 to enter and no handrail(s).   · Pt has a walk-in shower and a comfort height toilet.   · Upon discharge, patient will have assistance from spouse . Spouse reports she has "rods" in her back and will be of limited physical assistance. She has a service dog with her in the hospital and reports she "falls a lot."  Prior level of function:  · Ambulation: independent  · ADL's: independent  · IADLs: independent including driving, grocery shopping, "does everything" as spouse is unable to perform many of the physical household duties. Patient works full time as an OB/GYN physician.   · Falls: Denies  Equipment used at home:   · none      Objective:     Patient found with:   [place] J at cervical spine    General Precautions: Standard, (fall risk)   Orthopedic Precautions:(no formal precautions, however maintained spinal precautions during session)   Braces: [place] collar("PRN for comfort")     Exams:  Cognition:   Patient is oriented to name, .  Pt follows approximately 100% of simple commands.    Mood: Pleasant and cooperative.   Musculoskeletal:  Posture:    -       guarding in [place] brace  LE ROM/Strength: no impairments at bilateral LEs. Pt reports baseline impairments to 4th digit on R hand and L shoulder  Neuromuscular:  Sensation: No new onset paresthesias.   Coordination/Tone/Reflexes: No impairments identified with functional mobility. No formal testing performed.   Balance: CGA for dynamic standing without UE support  Visual-vestibular: No impairments identified with functional mobility. No formal testing performed.  Integument: anterior neck surgical site not observed  Cardiopulmonary:  Vital signs: SpO2 on room air: 96% Heart rate 84 bpm  Edema: none noted; [place] brace obscuring assessment at surgical site      Functional Mobility:  Bed Mobility:     Supine to Sit: mod I "   Sit to Supine: mod I  Transfers:     Sit to Stand:  indep with no AD.   Gait: Approximately 25 ft on level tile with CGA and no AD.   ·     AM-PAC 6 CLICK MOBILITY  Total Score:21       Therapeutic Activities and Exercises:   -PT checked fit of [place] collar prior to OOB activity. Educated patient and spouse on proper fit and MD orders for [place] for comfort.   -Further activity limited by time due to patient transition to stretcher with transport off the floor for imaging.     Patient left supine in stretcher with RN, transport, and spouse present.    GOALS:   Multidisciplinary Problems     Physical Therapy Goals        Problem: Physical Therapy Goal    Goal Priority Disciplines Outcome Goal Variances Interventions   Physical Therapy Goal     PT, PT/OT Ongoing (interventions implemented as appropriate)     Description:  Goals to be met by: 18     Patient will increase functional independence with mobility by performin. Gait > 150 feet independently.   2. Ascend/descend 2 stairs with no handrails with CGA with or without AD.  3. Don/doff [place] brace with appropriate assist from significant other and supervision from PT.                       History:     Past Medical History:   Diagnosis Date    Anticoagulant long-term use     Atrial fibrillation     1st diagnosed in med school    Basal cell carcinoma     right temporal scalp    Cataract     Colon polyps     Retinal hole     Right eye    Seronegative rheumatoid arthritis     Sixth nerve palsy of right eye 2016    Sleep apnea        Past Surgical History:   Procedure Laterality Date    BACK SURGERY       and ; last by Naldo    COLONOSCOPY N/A 2018    Procedure: COLONOSCOPY;  Surgeon: Nadia Scott MD;  Location: [facility] ENDO;  Service: Endoscopy;  Laterality: N/A;    COLONOSCOPY N/A 2018    Performed by Nadia Scott MD at [facility] ENDO    COLONOSCOPY N/A 2012    [facility]formed [facility] Han [facility] at [facility]  ENDO (4TH FLR)    COSMETIC SURGERY      Focal Laser       Right eye    HERNIA REPAIR Right     inguinal    SKIN CANCER EXCISION      SPINE SURGERY      l3-4/ l5-s1  (x 2)    TONSILLECTOMY         Clinical Decision Making:     History  Co-morbidities and personal factors that may impact the plan of care Examination  Body Structures and Functions, activity limitations and participation restrictions that may impact the plan of care Clinical Presentation   Decision Making/ Complexity Score   Co-morbidities:   [] Time since onset of injury / illness / exacerbation  [] Status of current condition  []Patient's cognitive status and safety concerns    [] Multiple Medical Problems (see med hx)  Personal Factors:   [] Patient's age  [] Prior Level of function   [] Patient's home situation (environment and family support)  [] Patient's level of motivation  [] Expected progression of patient      HISTORY:(criteria)    [] 27928 - no personal factors/history    [] 45753 - has 1-2 personal factor/comorbidity     [] 55248 - has >3 personal factor/comorbidity     Body Regions:  [] Objective examination findings  [] Head     []  Neck  [] Trunk   [] Upper Extremity  [] Lower Extremity    Body Systems:  [] For communication ability, affect, cognition, language, and learning style: the assessment of the ability to make needs known, consciousness, orientation (person, place, and time), expected emotional /behavioral responses, and learning preferences (eg, learning barriers, education  needs)  [] For the neuromuscular system: a general assessment of gross coordinated movement (eg, balance, gait, locomotion, transfers, and transitions) and motor function  (motor control and motor learning)  [] For the musculoskeletal system: the assessment of gross symmetry, gross range of motion, gross strength, height, and weight  [] For the integumentary system: the assessment of pliability(texture), presence of scar formation, skin color, and  skin integrity  [] For cardiovascular/pulmonary system: the assessment of heart rate, respiratory rate, blood pressure, and edema     Activity limitations:    [] Patient's cognitive status and saf ety concerns          [] Status of current condition      [] Weight bearing restriction  [] Cardiopulmunary Restriction    Participation Restrictions:   [] Goals and goal agreement with the patient     [] Rehab potential (prognosis) and probable outcome      Examination of Body System: (criteria)    [] 97813 - addressing 1-2 elements    [] 63003 - addressing a total of 3 or more elements     [] 50692 -  Addressing a total of 4 or more elements         Clinical Presentation: (criteria)  Choose one     On examination of body system using standardized tests and measures patient presents with (CHOOSE ONE) elements from any of the following: body structures and functions, activity limitations, and/or participation restrictions.  Leading to a clinical presentation that is considered (CHOOSE ONE)                              Clinical Decision Making  (Eval Complexity):  Choose One     Time Tracking:     PT Received On: 11/28/18  PT Start Time: 1612     PT Stop Time: 1631  PT Total Time (min): 19 min     Billable Minutes: Evaluation 19      Jane Mason, PT  11/28/2018

## 2018-11-28 NOTE — OR NURSING
Two abrasions noted to left forearm. Covered with mepilex.Unable to remove wedding band. Covered with coban

## 2018-11-28 NOTE — PLAN OF CARE
Problem: Physical Therapy Goal  Goal: Physical Therapy Goal  Goals to be met by: 18     Patient will increase functional independence with mobility by performin. Gait > 150 feet independently.   2. Ascend/descend 2 stairs with no handrails with CGA with or without AD.  3. Don/doff [place] J brace with appropriate assist from significant other and supervision from PT.     Outcome: Ongoing (interventions implemented as appropriate)  Patient evaluated by PT. Good participation in mobility including gait approximately 25 ft with CGA and no AD. Further activity deferred due to patient transport for imaging. PT will continue to follow and progress as tolerated with focus on gait, stair training, and [place] donning/doffing tomorrow.     Recommend outpatient PT when cleared by surgeon.

## 2018-11-28 NOTE — BRIEF OP NOTE
[facility]  Brief Operative Note    SUMMARY     Surgery Date: 11/28/2018     Surgeon(s) and Role:     * José Miguel Hitchcock MD - Primary    Assisting Surgeon: None    Pre-op Diagnosis:  HNP (herniated nucleus pulposus), cervical [M50.20]  Radiculopathy of cervical region [M54.12]  Spondylosis of cervical spine with myelopathy [M47.12]    Post-op Diagnosis:  Post-Op Diagnosis Codes:     * HNP (herniated nucleus pulposus), cervical [M50.20]     * Radiculopathy of cervical region [M54.12]     * Spondylosis of cervical spine with myelopathy [M47.12]    Procedure(s) (LRB):  C4-5 and C5-6 ACDF (Right)    Anesthesia: General    Description of Procedure: Right transcervical approach for anterior cervical discectomy and fusion C4-C5, C-C6      Estimated Blood Loss: * No values recorded between 11/28/2018  9:22 AM and 11/28/2018 11:47 AM *         Specimens:   Specimen (12h ago, onward)    None

## 2018-11-28 NOTE — OP NOTE
DATE OF PROCEDURE: 11/28/2018     PREOPERATIVE DIAGNOSES:   HNP (herniated nucleus pulposus), cervical [M50.20]  Radiculopathy of cervical region [M54.12]  Spondylosis of cervical spine with myelopathy [M47.12]    POSTOPERATIVE DIAGNOSES:   HNP (herniated nucleus pulposus), cervical [M50.20]  Radiculopathy of cervical region [M54.12]  Spondylosis of cervical spine with myelopathy [M47.12]    PROCEDURES PERFORMED:   Procedure(s) (LRB):  C4-5 and C5-6 ACDF (Right)    Surgeon(s) and Role:     * José Miguel Hitchcock MD - Primary  Resident- Lito Mac MD    ANESTHESIA: General    ESTIMATED BLOOD LOSS: 50cc    CLINICAL HISTORY AND INDICATIONS FOR SURGERY: Franko Buchanan is a 68 y.o. male who developed signs and symptoms of cervical spondylosis and HNP. Preoperative MRI demonstrated disk herniation at C4-C5 and C5-6 with spinal stenosis with cord compression. he failed all non surgical treatment options. Given the progression of the symptoms, surgery was offered and after discussing all the attendant risks, benefits, and potential complication of surgery. he  wanted to proceed with surgery and consented to surgery.     OPERATIVE PROCEDURE: The patient was brought into the Operating Room, identified and general anesthesia was induced using endotracheal intubation. We set up for intraoperative SSEPs and free-running EMGs and baseline parameters were obtained. The patient was then gently logrolled in the supine position and all neurocutaneous pressure points were checked and padded.  We used lateral x-ray to localize theC4-C5 and C5-6 levels and right transverse incision was designed. Anterior cervical region was prepped and draped using sterile technique.    We incised the skin, subcutaneous tissue, and then got to the platysma. We dissected the platysma along its fibers, undermined the platysma as well as the Deep cutaneous tissue layer of the skin. This gave us good mobilization of the tissues up and down. We then found  the medial edge of the sternocleidomastoid muscle. We found the omohyoid, which was disescted along its fibers for easy mobilization. We then went through the avascular tissue plane, palpated for the carotid sheath, which was maintained laterally, and the tracheo-esophageal complex was maintained medially. We then got into the anterior cervical region.  We placed a spinal needle and obtained another lateral x-ray to confirm that we are at C4-C5 and C5-6 level. We then used a monopolar cautery to undermine the longus colli bilaterally. We placed Trimline retractors. We used [place] rongeur to remove the osteophyte overlying the C4-C5 and C5-6 disk space. We used a #15 blade to cut into the C4-C5 and C5-6 disk space and then placed [place] pin distractors. We distracted the disk spaces. We brought in the operating microscope and under the operative microscope, we used a combination of angled curette and pituitary forceps to do a complete diskectomy at C4-C5 and C5-6. We undercut the osteophyte bridging the disk space to get to better access to the disk space and remove all the remaining disk materials. We got to the posterior longitudinal ligament. We used a nerve hook to enter the PLL laterally and then used a combination of Kerrisons 1, 2, and 3 to remove the PLL together with the bridging osteophytes pushing on the spinal cord and the nerve roots. We were able to do a complete removal of the PLL as well as do complete osteophytectomy, fully decompressing the spinal cord and the nerve roots at C4-C5 and at C5-C6 . We used a nerve hook to probe into the foramina and they were fully decompressed. We had good decompression of the nerve roots at these levels as well as the central spinal canal. We used Gelfoam soaked in thrombin for hemostasis throughout the surgery. We used angled curette and a rasp to prepare the endplates. We then chose a 7 mm lordotic allograft and placed it at C4-C5 and a 7 millimeter allograft and  place it at C5-C6. We chose a 34 mm plate and place it in the anterior cervical region using 4.2 by 14 mm self-drilling screws to fix to the bone. Final AP and lateral x-ray were satisfactory.     We withdrew the Trimline retractors and cauterized any bleeding points on our way out. We then used 3-0 Vicryl stitch to approximate the platysma and the subcuticular tissue and we used skin glue to approximate the skin edges. We placed Telfa and Tegaderm for final dressing of the incision.     The patient was repositioned supine on the hospital bed, weaned off general anesthesia and extubated. he  was moving both upper and lower extremities symmetrically and strongly and was transferred to the Recovery Room in stable condition.

## 2018-11-28 NOTE — TRANSFER OF CARE
"Anesthesia Transfer of Care Note    Patient: Franko Buchanan    Procedure(s) Performed: Procedure(s) (LRB):  C4-5 and C5-6 ACDF (Right)    Patient location: PACU    Anesthesia Type: general    Transport from OR: Transported from OR on 2-3 L/min O2 by NC with adequate spontaneous ventilation    Post pain: adequate analgesia    Post assessment: no apparent anesthetic complications    Post vital signs: stable    Level of consciousness: awake    Nausea/Vomiting: no nausea/vomiting    Complications: none    Transfer of care protocol was followed      Last vitals:   Visit Vitals  /74   Pulse 87   Temp 37.6 °C (99.7 °F) (Oral)   Resp 16   Ht 6' 1" (1.854 m)   Wt 102.1 kg (224 lb 16 oz)   SpO2 96%   BMI 29.69 kg/m²     "

## 2018-11-28 NOTE — ANESTHESIA POSTPROCEDURE EVALUATION
"Anesthesia Post Evaluation    Patient: Franko Buchanan    Procedure(s) Performed: Procedure(s) (LRB):  C4-5 and C5-6 ACDF (Right)    Final Anesthesia Type: general  Patient location during evaluation: PACU  Patient participation: Yes- Able to Participate  Level of consciousness: awake and alert  Post-procedure vital signs: reviewed and stable  Pain management: adequate  Airway patency: patent  PONV status at discharge: No PONV  Anesthetic complications: no      Cardiovascular status: hemodynamically stable  Respiratory status: unassisted  Hydration status: euvolemic  Follow-up not needed.        Visit Vitals  /77   Pulse 73   Temp 37.6 °C (99.6 °F) (Oral)   Resp 16   Ht 6' 1" (1.854 m)   Wt 102.1 kg (224 lb 16 oz)   SpO2 97%   BMI 29.69 kg/m²       Pain/Conor Score: Pain Assessment Performed: Yes (11/28/2018 12:53 PM)  Presence of Pain: denies (11/28/2018 12:53 PM)  Pain Rating Prior to Med Admin: 2 (11/28/2018 12:23 PM)  Pain Rating Post Med Admin: 0 (11/28/2018 12:53 PM)  Conor Score: 9 (11/28/2018 12:53 PM)        "

## 2018-11-29 VITALS
HEIGHT: 73 IN | HEART RATE: 74 BPM | WEIGHT: 225 LBS | TEMPERATURE: 98 F | BODY MASS INDEX: 29.82 KG/M2 | OXYGEN SATURATION: 95 % | RESPIRATION RATE: 18 BRPM | DIASTOLIC BLOOD PRESSURE: 60 MMHG | SYSTOLIC BLOOD PRESSURE: 128 MMHG

## 2018-11-29 PROCEDURE — 97165 OT EVAL LOW COMPLEX 30 MIN: CPT

## 2018-11-29 PROCEDURE — 94799 UNLISTED PULMONARY SVC/PX: CPT

## 2018-11-29 PROCEDURE — 97530 THERAPEUTIC ACTIVITIES: CPT

## 2018-11-29 PROCEDURE — 99900035 HC TECH TIME PER 15 MIN (STAT)

## 2018-11-29 PROCEDURE — 25000003 PHARM REV CODE 250: Performed by: STUDENT IN AN ORGANIZED HEALTH CARE EDUCATION/TRAINING PROGRAM

## 2018-11-29 PROCEDURE — 94761 N-INVAS EAR/PLS OXIMETRY MLT: CPT

## 2018-11-29 RX ORDER — OXYCODONE AND ACETAMINOPHEN 10; 325 MG/1; MG/1
1 TABLET ORAL EVERY 6 HOURS PRN
Qty: 60 TABLET | Refills: 0 | Status: SHIPPED | OUTPATIENT
Start: 2018-11-29 | End: 2018-12-20 | Stop reason: SDUPTHER

## 2018-11-29 RX ORDER — DIAZEPAM 5 MG/1
5 TABLET ORAL EVERY 6 HOURS PRN
Qty: 60 TABLET | Refills: 0 | Status: ON HOLD | OUTPATIENT
Start: 2018-11-29 | End: 2019-08-09 | Stop reason: HOSPADM

## 2018-11-29 RX ADMIN — OXYCODONE HYDROCHLORIDE AND ACETAMINOPHEN 1 TABLET: 10; 325 TABLET ORAL at 02:11

## 2018-11-29 RX ADMIN — OXYCODONE HYDROCHLORIDE AND ACETAMINOPHEN 1 TABLET: 10; 325 TABLET ORAL at 09:11

## 2018-11-29 RX ADMIN — OXYCODONE HYDROCHLORIDE AND ACETAMINOPHEN 1 TABLET: 10; 325 TABLET ORAL at 06:11

## 2018-11-29 NOTE — PLAN OF CARE
Problem: Patient Care Overview  Goal: Plan of Care Review  Outcome: Ongoing (interventions implemented as appropriate)  Pt remains on RA. IS done.

## 2018-11-29 NOTE — PT/OT/SLP EVAL
Occupational Therapy   Evaluation & Treatment    Name: Franko Buchanan  MRN: [id number]  Admitting Diagnosis:  Spondylosis 1 Day Post-Op    Recommendations:     Discharge Recommendations: outpatient OT, outpatient PT  Discharge Equipment Recommendations:  none  Barriers to discharge:  None    History:     Occupational Profile:  Living Environment: Pt lives with wife in 2SH (they stay on 1st floor), 3-4 ANNIE in front and in back, 2STE in front with bilateral HR present (unable to hold onto both at same time).  Bathroom has walk-in shower with bench.   Previous level of function: Pt reports being (I) with all ADLs and mobility.    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  Equipment Used at Home:  none  Assistance upon Discharge: Wife able to provide some assist, but has rods in back and is limited in physical activity.      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    Sleep apnea        Past Surgical History:   Procedure Laterality Date    BACK SURGERY      1990's and 2009; last by Naldo    COLONOSCOPY N/A 1/30/2018    Procedure: COLONOSCOPY;  Surgeon: Nadia Scott MD;  [facility];  Service: Endoscopy;  Laterality: N/A;    COLONOSCOPY N/A 1/30/2018    [facility]    COLONOSCOPY N/A 12/14/2012    [facility] ([facility] F[facility])    COSMETIC SURGERY      Focal Laser       Right eye    HERNIA REPAIR Right     inguinal    SKIN CANCER EXCISION      SPINE SURGERY      l3-4/ l5-s1  (x 2)    TONSILLECTOMY         Subjective     Chief Complaint: Weakness in deltoid and trapezius limiting his ability to tie  surgical mask and perform certain surgical and non-surgical tasks at work  Patient/Family Comments/goals: Resume PLOF; increased strength and ROM in LUE    Pain/Comfort:  · Pain Rating 1: 4/10(without coughing; 8/10 with coughing)  · Pain Rating Post-Intervention 1: 8/10    Patients cultural, spiritual, Religion conflicts given the current situation: None stated    Objective:     Communicated with: RN prior to session.  Patient found with: call button in reach and ([place] collar ) upon OT entry to room.  Wife present during session.    General Precautions: Standard, fall   Orthopedic Precautions:(spinal precautions)   Braces: [place] J collar(PRN for comfort)     Occupational Performance:    Bed Mobility:    · Patient completed Rolling/Turning to Left with  independence  · Patient completed Scooting/Bridging with independence  · Patient completed Supine to Sit with independence    Functional Mobility/Transfers:  · Patient completed Sit <> Stand Transfer with independence  with  no assistive device   · Functional Mobility: Pt ambulated 20 ft within room with supervision; no instances of postural sway or LOB noted.    Activities of Daily Living:  · Grooming: Supervision for washing face while standing at sink.  Cues given to adhere to spinal precautions.  · Upper Body Dressing: Minimum assistance for donning gown on backside like robe.  Pt with decreased ROM and strength in LUE.  Education provided on importance of dressing L side first.    Cognitive/Visual Perceptual:  Cognitive/Psychosocial Skills:    -       Oriented to: Person, Place, Time and Situation   -       Follows Commands/attention:Follows multistep  commands  -       Communication: clear/fluent  -       Memory: No Deficits noted  -       Safety awareness/insight to disability: intact   -       Mood/Affect/Coping skills/emotional control: Appropriate to situation    Physical Exam:  Postural examination/scapula alignment:    -       No postural  abnormalities identified  Skin integrity: Visible skin intact  Edema:  None noted  Sensation: -       Intact  Motor Planning: -       WFL  Dominant hand: -       Right  Upper Extremity Range of Motion:    -       Right Upper Extremity: WNL  -       Left Upper Extremity: WFL; limited to ~90 degrees of shoulder flexion.  Difficulty touching top of head noted.  Upper Extremity Strength:   -        Right Upper Extremity: WNL  -        Left Upper Extremity: WFL; grossly 4/5 for biceps/triceps, 3/5 for deltoids   Strength: 5/5 both hands  Fine Motor Coordination:Intact  Gross motor coordination: WNL  Balance:  Sitting- Independent, Standing- Supervision    AMPA 6 Click ADL:  [facility] Total Score: 21    Treatment & Education:  *Pt educated on:  -log rolling technique  -spinal precautions  -how to don/doff [place] J collar  -how to adhere to spinal precautions with ADLs  -UB dressing techniques to help accommodate decreased ROM and strength in LUE  -role of OT in acute care and OP facility  *POC reviewed with pt    Other:  *Pt states he has been experiencing decreased strength and ROM in LUE for ~3 weeks.  He has been having difficulty tying surgical mask, performing episiotomies,    Education:    Patient left sitting EOB with call button in reach; wife present    Assessment:     Franko Buchanan is a 68 y.o. male with a medical diagnosis of Spondylosis.  He presents with the following performance deficits affecting function: orthopedic precautions, decreased upper extremity function, impaired self care skills.  Pt demonstrates strength and ROM in (B) UE needed for ADLs that is WNL in RUE and WFL for LUE.  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  Pt able to perform sit to stand transfer and ambulate with supervision.  Pt would benefit from skilled OT services to address problems listed below and increase independence with ADLs.  OP OT/PT is  "recommended upon d/c from acute care to further address deficits in LUE and help pt improve overall functional independence at home and in work environment.        Rehab Prognosis: Good; patient would benefit from acute skilled OT services to address these deficits and reach maximum level of function.         Clinical Decision Makin.  OT Low:  "Pt evaluation falls under low complexity for evaluation coding due to performance deficits noted in 1-3 areas as stated above and 0 co-morbities affecting current functional status. Data obtained from problem focused assessments. No modifications or assistance was required for completion of evaluation. Only brief occupational profile and history review completed."     Plan:     Patient to be seen 4 x/week to address the above listed problems via self-care/home management, therapeutic activities, therapeutic exercises  · Plan of Care Expires:    · Plan of Care Reviewed with: patient    This Plan of care has been discussed with the patient who was involved in its development and understands and is in agreement with the identified goals and treatment plan    GOALS:   Multidisciplinary Problems     Occupational Therapy Goals        Problem: Occupational Therapy Goal    Goal Priority Disciplines Outcome Interventions   Occupational Therapy Goal     OT, PT/OT     Description:  Goals to be met by: 2018     Patient will increase functional independence with ADLs by performing:    UE Dressing with [place].  LE Dressing with [place].  Grooming while standing with [place].  Toileting from toilet with [place] for hygiene and clothing management.   Toilet transfer to toilet with [place].  Pt will identify spinal precautions via teach back method.                      Time Tracking:     OT Date of Treatment: 18  OT Start Time: 0815  OT Stop Time: 0840  OT Total Time (min): 25 min    Billable Minutes:Evaluation 25    Hattie Cisneros, " LOTR  11/29/2018

## 2018-11-29 NOTE — PLAN OF CARE
Problem: Patient Care Overview  Goal: Plan of Care Review  Outcome: Ongoing (interventions implemented as appropriate)  Patient alert and oriented x 3. Wife and service dog at bedside. [place] J neck collar in place with HOB elevated. Patient able to ambulate independently to restroom. Pop bao and ice given for comfort. Pain to neck controlled with oral and IV pain medication. IV intact, maintanence fluids maintained. Afebrile and vitals signs stable. Fall precautions in place, no falls or injury sustained during shift. Purposeful hourly rounding conducted will continue to monitor.

## 2018-11-29 NOTE — PT/OT/SLP DISCHARGE
Occupational Therapy Discharge Summary    Franko Buchanan  MRN: [id number]   Principal Problem: Spondylosis      Patient Discharged from acute Occupational Therapy on 11/29/2018.  Please refer to prior OT note dated 11/29/2018 for functional status.    Assessment:      Patient appropriate for care in another setting. Goals not met prior to discharge.    Objective:     GOALS:   Multidisciplinary Problems     Occupational Therapy Goals        Problem: Occupational Therapy Goal    Goal Priority Disciplines Outcome Interventions   Occupational Therapy Goal     OT, PT/OT     Description:  Goals to be met by: 12/6/2018     Patient will increase functional independence with ADLs by performing:    UE Dressing with [place].  LE Dressing with [place].  Grooming while standing with [place].  Toileting from toilet with [place] for hygiene and clothing management.   Toilet transfer to toilet with [place].  Pt will identify spinal precautions via teach back method.                      Reasons for Discontinuation of Therapy Services  Transfer to alternate level of care.      Plan:     Patient Discharged to: Home: therapy recommending OP OT/PT    Jessi Velazquez, OT  11/29/2018

## 2018-11-29 NOTE — PLAN OF CARE
Problem: Physical Therapy Goal  Goal: Physical Therapy Goal  Goals to be met by: 18     Patient will increase functional independence with mobility by performin. Gait > 150 feet independently. -- Goal Achieved 18  2. Ascend/descend 2 stairs with no handrails with CGA with or without AD.  -- Goal Achieved 18  3. Don/doff [place] J brace with appropriate assist from significant other and supervision from PT.   -- Goal Achieved 18     Outcome: Ongoing (interventions implemented as appropriate)  Patient achieved all PT goals, and is demonstrating excellent progress. Patient is safe and independent with gait, stair negotiation, and donning/doffing brace; therefore, no longer requires acute PT services.

## 2018-11-29 NOTE — DISCHARGE INSTRUCTIONS
Please follow ONLY the instructions that are checked below.    Activity Restrictions:  [x]  Return to work will be determined on an individual basis.  [x]  No lifting greater than 10 pounds.  [x]  Avoid bending and twisting the area of your surgery more than 45 degrees from neutral position in any direction.  [x]  No driving or operating machinery:  [x]  until cleared by your surgeon.  [x]  while taking narcotic pain medications or muscle relaxants.  []  No cervical collar, soft collar, or lumbar brace required.  []  Wear your brace at all times. You may be given an extra brace or soft collar to wear when showering.  [x]  Wear your brace at all times except when flat in bed.  []  Wear brace for comfort only.  [x]  Increase ambulation over the next 2 weeks so that you are walking 2 miles per day at 2 weeks post-operatively.  [x]  Walk on paved surfaces only. It is okay to walk up and down stairs while holding onto a side rail.  [x]  No sexual activity for 2-3 weeks.    Discharge Medication/Follow-up:  [x]  Please refer to discharge medication reconciliation form.  [x]  Do not take ANY non-steroidal anti-inflammatory drugs (NSAIDS), including the following: ibuprofen, naprosyn, Aleve, Advil, Indocin, Mobic, or Celebrex for:  []  4 weeks  []  8 weeks  [x]  6 months  [x]  Prescriptions for appropriate medication will be given upon discharge.   [x]  Pain control:             [x]  Muscle relaxer:            [x]  Take docusate (Colace 100 mg): take one capsule a day as needed for constipation. You can get this over the counter.  [x]  Follow-up appointment:  [x]  10-14 days post-op for wound check by physician assistant/nurse  [x]  4-6 weeks with MD:  [x]  with x-rays  []  without x-rays  []  An appointment will be mailed to you.    Wound Care:  []  Remove dressing or bandaid in    days.  [x]  No bandage required. Keep your incision open to the air.  [x]  You may shower on the 2nd day after your surgery. Have the force of  water hit you opposite from the incision. Pat the incision dry after your shower; do not scrub the incision.  []  You cannot take a bath until 8 weeks after surgery.  []  Apply bacitracin to incision twice a day for    more days.    Call your doctor or go to the Emergency Room for any signs of infection, including: increased redness, drainage, pain, or fever (temperature ?101.5 for 24 hours). Call your doctor or go to the Emergency Room if there are any localized neurological changes; problems with speech, vision, numbness, tingling, weakness, or severe headache; or for other concerns.    Special Instructions:  [x]  No use of tobacco products.  [x]  Diet: Please eat a regular diet as tolerated.  []  Other diet:              Specific physician instructions:  Resume ASA in five days         Physicians need 3 days' notice for pain medicine to be refilled. Pain medicine will only be refilled between 8 AM and 5 PM, Monday through Friday, due to Food and Drug Administration regulation of documentation.    If you have any questions about this form, please call [phone].    Form No. 55297 (Revised 10/31/2013)

## 2018-11-29 NOTE — PROGRESS NOTES
[facility]  Neurosurgery  Progress Note  11/29/18  Subjective:       History of Present Illness: Patient s/p C4-6 ACDF POD#1.  Doing well.  Pain controlled.  Some difficulty swallowing.  Some left arm weakness that he had preop.  No arm pain or paresthesias.  No hoarseness of the voice.  He has been eating, drinking, and walking around the room.    Post-Op Info:  Procedure(s) (LRB):  C4-5 and C5-6 ACDF (Right)   1 Day Post-Op      Medications:  Continuous Infusions:   sodium chloride 0.9% 100 mL/hr at 11/28/18 1333     Scheduled Meds:   bisacodyl  10 mg Rectal Daily    DULoxetine  60 mg Oral Daily    flecainide  300 mg Oral Daily    metoprolol succinate  25 mg Oral Daily    mupirocin  1 g Nasal BID     PRN Meds:acetaminophen, acetaminophen, aluminum-magnesium hydroxide-simethicone, diazePAM **FOLLOWED BY** diazePAM, morphine, ondansetron, oxyCODONE-acetaminophen, promethazine (PHENERGAN) IVPB, senna-docusate 8.6-50 mg     Review of Systems  Objective:     Weight: 102.1 kg (224 lb 16 oz)  Body mass index is 29.69 kg/m².  Vital Signs (Most Recent):  Temp: 98.3 °F (36.8 °C) (11/29/18 0815)  Pulse: 73 (11/29/18 0815)  Resp: 16 (11/29/18 0815)  BP: 128/60 (11/29/18 0815)  SpO2: (!) 92 % (11/29/18 0815) Vital Signs (24h Range):  Temp:  [97.4 °F (36.3 °C)-99.7 °F (37.6 °C)] 98.3 °F (36.8 °C)  Pulse:  [70-87] 73  Resp:  [16-18] 16  SpO2:  [92 %-97 %] 92 %  BP: (114-154)/(56-85) 128/60                 Oxygen Concentration (%):  [28-97] 28         Neurosurgery Physical Exam    General: well developed, well nourished, no distress  Mental Status: Awake, Alert, Oriented X3.Thought content appropriate  GCS: Motor: 6/Verbal: 5/Eyes: 4 GCS Total: 15    Motor Strength:  Strength  Deltoids Triceps Biceps Wrist Extension Wrist Flexion Hand    Upper: R 5/5 5/5 5/5 5/5 5/5 5/5    L 4-/5 4+/5 5/5 5/5 5/5 5/5     HF KF KE DF PF EHL   Lower: R 5/5 5/5 5/5 5/5 5/5 5/5    L 5/5 5/5 5/5 5/5 5/5 5/5       Dickson:  absent B/L  Clonus: absent B/L  Incision CDI      Significant Labs:  No results for input(s): GLU, NA, K, CL, CO2, BUN, CREATININE, CALCIUM, MG in the last 48 hours.  No results for input(s): WBC, HGB, HCT, PLT in the last 48 hours.  No results for input(s): LABPT, INR, APTT in the last 48 hours.  Microbiology Results (last 7 days)     ** No results found for the last 168 hours. **          Significant Diagnostics:  Cervical spine ap/lat xrays personally reviewed.  Hardware intact.    Assessment/Plan:     Active Diagnoses:    Diagnosis Date Noted POA    PRINCIPAL PROBLEM:  Spondylosis [M47.9] 11/28/2018 Yes      Problems Resolved During this Admission:     A/P:  POD #1 C4-6 ACDF    -Neurologically stable  -Cervical xrays look good  -Will DC home today.  Fu in 2 weeks for wound check and 6 weeks with Dr. Hitchcock with xrays beforehand    All of the above discussed and reviewed with Dr. Hitchcock.    RAHEEM YanesC  Neurosurgery  [facility]

## 2018-11-29 NOTE — PLAN OF CARE
11/29/18 1139   Final Note   Assessment Type Final Discharge Note   Anticipated Discharge Disposition Home   What phone number can be called within the next 1-3 days to see how you are doing after discharge? ([phone])   Hospital Follow Up  Appt(s) scheduled? No   Discharge plans and expectations educations in teach back method with documentation complete? No

## 2018-11-29 NOTE — PLAN OF CARE
Problem: Occupational Therapy Goal  Goal: Occupational Therapy Goal  Goals to be met by: 12/6/2018     Patient will increase functional independence with ADLs by performing:    UE Dressing with [place].  LE Dressing with [place].  Grooming while standing with [place].  Toileting from toilet with [place] for hygiene and clothing management.   Toilet transfer to toilet with [place].  Pt will identify spinal precautions via teach back method.      OT evaluation complete and POC established.  OP OT and OP PT is recommended upon d/c from acute care to further address strength and ROM deficits in LUE and help pt improve overall functional independence.     CHELSEA Grajeda  11/29/2018

## 2018-11-29 NOTE — PLAN OF CARE
Problem: Patient Care Overview  Goal: Plan of Care Review  Outcome: Ongoing (interventions implemented as appropriate)  No respiratory distress noted, CPAP machine on standby. Pt did not tolerate mask, patient use nasal pillows at home.

## 2018-11-29 NOTE — DISCHARGE SUMMARY
[facility]  Neurosurgery  Discharge Summary      Patient Name: Franko Buchanan  MRN: [id number]  Admission Date: 11/28/2018  Hospital Length of Stay: 1 days  Discharge Date and Time:  11/29/2018   Attending Physician: José Miguel Hitchcock MD   Discharging Provider: Florence Payan PA-C  Primary Care Provider: Loyd Garcia MD       Procedure(s) (LRB):  C4-5 and C5-6 ACDF (Right)     Hospital Course:   Patient admitted after surgery with Dr. Hitchcock which included a C4-6 ACDF.  Did well overnight with some swallowing difficulty and left arm weakness which was present preoperatively.  Neck pain controlled with pain medications.  No new arm pain or paresthesias.  Ambulated well.  Cervical xrays looked fine.  WILLIAM and neurologically intact.  4/5 left deltoid.  DC home today and fu in two weeks for wound check and in 6 weeks with xrays to see Dr. Hitchcock.    Significant Diagnostic Studies: Cervical xrays show good hardware placement.    Pending Diagnostic Studies:     None        Final Active Diagnoses:    Diagnosis Date Noted POA    PRINCIPAL PROBLEM:  Spondylosis [M47.9] 11/28/2018 Yes      Problems Resolved During this Admission:      Discharged Condition: good    Disposition:     Follow Up:    Patient Instructions:   No discharge procedures on file.  Medications:  Reconciled Home Medications:      Medication List      START taking these medications    diazePAM 5 MG tablet  Commonly known as:  VALIUM  Take 1 tablet (5 mg total) by mouth every 6 (six) hours as needed (muscle spasm).        CHANGE how you take these medications    oxyCODONE-acetaminophen  mg per tablet  Commonly known as:  PERCOCET  Take 1 tablet by mouth every 6 (six) hours as needed (pain 8-10/10).  What changed:    · when to take this  · reasons to take this        CONTINUE taking these medications    calcium carbonate 220 mg capsule  Take 250 mg by mouth 2 (two) times daily with meals.     CENTRUM SILVER ORAL  Take by  mouth.     cholecalciferol (vitamin D3) 2,000 unit Cap  Commonly known as:  VITAMIN D3  Take 1 capsule by mouth once daily.     DULoxetine 60 MG capsule  Commonly known as:  CYMBALTA  Take 1 capsule (60 mg total) by mouth once daily.     ferrous sulfate 325 (65 FE) MG EC tablet  Take 325 mg by mouth as needed.     flecainide 150 MG Tab  Commonly known as:  TAMBOCOR  Take 2 tablets (300 mg total) by mouth daily as needed.     luliconazole 1 % Crea  Commonly known as:  LUZU  Apply 1 application topically once daily.     metoprolol succinate 25 MG 24 hr tablet  Commonly known as:  TOPROL-XL  Take 1 tablet (25 mg total) by mouth once daily.     SUPER EPA 1,000-5 mg-unit Cap  Generic drug:  omega-3 fatty acids-vitamin E  Take 1 capsule by mouth once daily.     triamcinolone acetonide 0.1% 0.1 % cream  Commonly known as:  KENALOG  AAA bid        STOP taking these medications    aspirin 325 MG EC tablet  Commonly known as:  ECOTRIN     celecoxib 200 MG capsule  Commonly known as:  CeleBREX     ibuprofen 200 MG tablet  Commonly known as:  MICAH CAMERON PA-C  Neurosurgery  [facility]

## 2018-11-29 NOTE — PT/OT/SLP PROGRESS
"Physical Therapy Treatment & Discharge    Patient Name:  Franko Buchanan   MRN:  [id number]    Recommendations:     Discharge Recommendations:  outpatient PT, outpatient OT   Discharge Equipment Recommendations: none   Barriers to discharge: None    Assessment:     Franko Buchanan is a 68 y.o. male admitted with a medical diagnosis of Spondylosis.  He presents with the following impairments/functional limitations:  orthopedic precautions, impaired functional mobilty, decreased ROM.    Patient achieved all PT goals, and is demonstrating excellent progress. Patient is safe and independent with gait, stair negotiation, and donning/doffing brace; therefore, no longer requires acute PT services.     Recent Surgery: Procedure(s) (LRB):  C4-5 and C5-6 ACDF (DISCECTOMY , SPINE , CERVICAL, ANTERIOR APPROACH W/FUSION) (Right) 1 Day Post-Op    Plan:     During this hospitalization, patient to be seen daily to address the above listed problems via gait training, therapeutic activities, therapeutic exercises, neuromuscular re-education  · Plan of Care Expires:  12/28/18   Plan of Care Reviewed with: patient, spouse    Subjective     Communicated with RN prior to session.  Patient found supine in bed upon PT entry to room, agreeable to treatment.      Chief Complaint: Pain  Patient comments/goals: "I can't look down when I go down the stairs so that's kind of scary."  Pain/Comfort:  · Pain Rating 1: 4/10(8/10 when coughing but 4/10 with no mobility)  · Location - Orientation 1: anterior  · Location 1: neck  · Pain Addressed 1: Distraction  · Pain Rating Post-Intervention 1: 4/10    Patients cultural, spiritual, Cheondoism conflicts given the current situation: none stated    Objective:   General Precautions: Standard, (Spinal)   Orthopedic Precautions:(no formal precautions, however maintained spinal precautions during session)   Braces: ([place] J collar PRN for comfort)     Functional Mobility:  · Bed Mobility:     · Scooting: " modified independence  · Supine to Sit: modified independence  · Transfers:     · Sit to Stand:  modified independence with no AD  · Gait:   · 440' with no AD, Independent   · Forward head and shoulders noted but correctable with verbal cues   · Balance:   · standing balance: I  · Stairs:    · Pt ascended/descended 4 stair(s) with No Assistive Device   · Trial 1: used single handrail upon descent and no hand rail upon ascent with CGA  · Trial 2: No hand rails used neither upon ascent nor descent with SBA  · Trial 3: No hand rails used neither upon ascent nor descent with SBA    AM-PAC 6 CLICK MOBILITY  Turning over in bed (including adjusting bedclothes, sheets and blankets)?: 4  Sitting down on and standing up from a chair with arms (e.g., wheelchair, bedside commode, etc.): 4  Moving from lying on back to sitting on the side of the bed?: 4  Moving to and from a bed to a chair (including a wheelchair)?: 4  Need to walk in hospital room?: 4  Climbing 3-5 steps with a railing?: 3  Basic Mobility Total Score: 23       Therapeutic Activities and Exercises:  · Practiced donbritton and carson REVELES; OT and PT demonstrated for patient prior and then had patient practice independently.    Patient left up in chair with call button in reach and RN notified..    GOALS:   Multidisciplinary Problems     Physical Therapy Goals        Problem: Physical Therapy Goal    Goal Priority Disciplines Outcome Goal Variances Interventions   Physical Therapy Goal     PT, PT/OT Ongoing (interventions implemented as appropriate)     Description:  Goals to be met by: 18     Patient will increase functional independence with mobility by performin. Gait > 150 feet independently. -- Goal Achieved 18  2. Ascend/descend 2 stairs with no handrails with CGA with or without AD.  -- Goal Achieved 18  3. Don/doff Laurel J brace with appropriate assist from significant other and supervision from PT.   -- Goal Achieved 18                        Time Tracking:     PT Received On: 11/29/18  PT Start Time: 0918     PT Stop Time: 0940  PT Total Time (min): 22 min     Billable Minutes: Therapeutic Activity 22    Treatment Type: Treatment  PT/PTA: PT     PTA Visit Number: 0     Ember Correa, PT  11/29/2018

## 2018-11-29 NOTE — PLAN OF CARE
Problem: Patient Care Overview  Goal: Plan of Care Review  Outcome: Outcome(s) achieved Date Met: 11/29/18  VU of DC instructions. Medication delivered per outpatient pharmacy to room.  IV removed w/ cath tip intact, WNL.  Free from falls, injury, or skin breakdown this hospital admission.

## 2018-11-30 ENCOUNTER — TELEPHONE (OUTPATIENT)
Dept: FAMILY MEDICINE | Facility: CLINIC | Age: 68
End: 2018-11-30

## 2018-11-30 ENCOUNTER — PATIENT MESSAGE (OUTPATIENT)
Dept: FAMILY MEDICINE | Facility: CLINIC | Age: 68
End: 2018-11-30

## 2018-11-30 ENCOUNTER — TELEPHONE (OUTPATIENT)
Dept: SPINE | Facility: CLINIC | Age: 68
End: 2018-11-30

## 2018-11-30 DIAGNOSIS — R93.89 ABNORMAL CHEST X-RAY: Primary | ICD-10-CM

## 2018-11-30 NOTE — TELEPHONE ENCOUNTER
----- Message from Paz Bowens sent at 11/30/2018 11:25 AM CST -----  Name of Who is Calling: Sultana (Wife)      What is the request in detail: Has questions about the pts upcoming appts/xra and wound care      Can the clinic reply by [facility]:    No       What [facility]: [phone]

## 2018-11-30 NOTE — TELEPHONE ENCOUNTER
There is nothing to do with the incision.  It has skin glue on it.    Please let patient and his wife know.    MEAGAN Bingham, PA-C  Neurosurgery  [facility]  [facility]

## 2018-11-30 NOTE — TELEPHONE ENCOUNTER
Called and spoke with patient's wife.  She stated that they wanted to cancel the wound check.  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  She wanted the xray for the [facility] location as it was scheduled for a different location.  Lastly she wanted to know if she should be doing anything for the wound.?   Please advise.

## 2018-11-30 NOTE — TELEPHONE ENCOUNTER
Wife was informed that nothing was to be done with the incision when she was on the phone earlier.  Told her that she would receive a call back if there was anything that needed to be done.

## 2018-11-30 NOTE — TELEPHONE ENCOUNTER
----- Message from Trini Penny sent at 11/30/2018  3:30 PM CST -----  Contact: [phone]  Patient wife wanted to know the last time the patient had the flu vaccination. Please call.

## 2018-12-01 ENCOUNTER — PATIENT MESSAGE (OUTPATIENT)
Dept: FAMILY MEDICINE | Facility: CLINIC | Age: 68
End: 2018-12-01

## 2018-12-03 ENCOUNTER — PATIENT MESSAGE (OUTPATIENT)
Dept: FAMILY MEDICINE | Facility: CLINIC | Age: 68
End: 2018-12-03

## 2018-12-03 ENCOUNTER — TELEPHONE (OUTPATIENT)
Dept: SPINE | Facility: CLINIC | Age: 68
End: 2018-12-03

## 2018-12-03 NOTE — TELEPHONE ENCOUNTER
----- Message from Yomaira Reyes RN sent at 12/3/2018  9:54 AM CST -----  Contact: Wife   Please advise his FMLA paperwork needs to go to the Disability department not the clinic.  ----- Message -----  From: Ines Linder  Sent: 12/3/2018   9:31 AM  To: [facility]aldo Valdez Staff              Name of Who is Calling: Sultana       What is the request in detail: Patient need send paper work for the patient's FMLA, and also need a referral for Rehab.       Can the clinic reply by [facility]: no    What Number to Call Back if not in [facility]: [phone]

## 2018-12-03 NOTE — TELEPHONE ENCOUNTER
Ernie Buchanan, I sent a message to the sleep center people regarding this and copied you on it.  Hopefully they should be able to proceed with the apap order given the documentation that you have provided along with the most up-to-date CPAP titration.    Regarding the chest x-ray, agree that with the low lung volumes sometimes does nonspecific changes seem a little bit more pronounced but I think it is reasonable to get a repeat x-ray in the next few weeks just to document things are improved especially if her respiratory status is fairly normal right now.  I will put another chest x-ray order in the chart and have my nurse schedule this with you any time within the next several weeks.    Loyd    ===View-only below this line===      ----- Message -----     From: Franko Buchanan     Sent: 11/30/2018  6:45 PM CST       To: Loyd Garcia MD  Subject: Non-Urgent Medical    Loyd, I have finally obtained a copy of the sleep study from [facility]. It is attached. Although this is an old study, it clearly shows that I need a properly adjusted CPAP/APAP device. The results in the original study were pretty shocking to me. Also, I have some concerns because while in the hospital for my ACDF, my chest X-ray showed low lung volume and some non-specific perihilar interstitial prominence bilaterally. I guess this could be artifact but I also had persistent pO2 levels, around 92%, in the OR and postop while on oxygen. I would like to follow up on this when I am further out from surgery. I'll set up an appointment.  What is the next step to obtaining an APAP?    Adam Buchanan  Attachment has 4 pages

## 2018-12-04 NOTE — TELEPHONE ENCOUNTER
Dr. Hitchcock typically does not start PT this early. He needs a 2 week wound check set up with [facility]. Can discuss possible PT at that time, but it may not start until 6 weeks postop.

## 2018-12-04 NOTE — TELEPHONE ENCOUNTER
Called and spoke with wife.  Informed her that they can discuss PT at the 6 week check up with Dr Hitchcock.  Wife verbalized understanding

## 2018-12-12 ENCOUNTER — HOSPITAL ENCOUNTER (OUTPATIENT)
Dept: RADIOLOGY | Facility: HOSPITAL | Age: 68
Discharge: HOME | End: 2018-12-12
Attending: FAMILY MEDICINE
Payer: COMMERCIAL

## 2018-12-12 DIAGNOSIS — R93.89 ABNORMAL CHEST X-RAY: ICD-10-CM

## 2018-12-12 PROCEDURE — 71046 X-RAY EXAM CHEST 2 VIEWS: CPT | Mod: TC,FY

## 2018-12-12 PROCEDURE — 71046 X-RAY EXAM CHEST 2 VIEWS: CPT | Mod: 26,,, | Performed by: RADIOLOGY

## 2018-12-20 NOTE — TELEPHONE ENCOUNTER
----- Message from Enedina Sanchez sent at 12/20/2018 11:59 AM CST -----  Contact: Pt. [phone]  Rx Refill/Request     Refill Rx:      Rx Name and Strength:  Oxycodone 10-325mg    Preferred Pharmacy with phone number:   [facility]  [address]  [place] [address]  Phone: [phone] Fax: [phone]      Communication Preference:PHONE     Additional Information:

## 2018-12-21 RX ORDER — OXYCODONE AND ACETAMINOPHEN 10; 325 MG/1; MG/1
1 TABLET ORAL EVERY 6 HOURS PRN
Qty: 60 TABLET | Refills: 0 | Status: SHIPPED | OUTPATIENT
Start: 2018-12-21 | End: 2019-03-21 | Stop reason: SDUPTHER

## 2018-12-23 ENCOUNTER — PATIENT MESSAGE (OUTPATIENT)
Dept: FAMILY MEDICINE | Facility: CLINIC | Age: 68
End: 2018-12-23

## 2019-01-22 ENCOUNTER — HOSPITAL ENCOUNTER (OUTPATIENT)
Dept: RADIOLOGY | Facility: OTHER | Age: 69
Discharge: HOME | End: 2019-01-22
Attending: NEUROLOGICAL SURGERY
Payer: COMMERCIAL

## 2019-01-22 ENCOUNTER — OFFICE VISIT (OUTPATIENT)
Dept: FAMILY MEDICINE | Facility: CLINIC | Age: 69
End: 2019-01-22
Payer: COMMERCIAL

## 2019-01-22 ENCOUNTER — PATIENT MESSAGE (OUTPATIENT)
Dept: SPINE | Facility: CLINIC | Age: 69
End: 2019-01-22

## 2019-01-22 ENCOUNTER — OFFICE VISIT (OUTPATIENT)
Dept: SPINE | Facility: CLINIC | Age: 69
End: 2019-01-22
Attending: NEUROLOGICAL SURGERY
Payer: COMMERCIAL

## 2019-01-22 VITALS
WEIGHT: 230.19 LBS | HEIGHT: 73 IN | SYSTOLIC BLOOD PRESSURE: 135 MMHG | BODY MASS INDEX: 30.51 KG/M2 | DIASTOLIC BLOOD PRESSURE: 76 MMHG | OXYGEN SATURATION: 98 % | TEMPERATURE: 99 F | HEART RATE: 57 BPM

## 2019-01-22 DIAGNOSIS — M43.22 FUSION OF SPINE OF CERVICAL REGION: ICD-10-CM

## 2019-01-22 DIAGNOSIS — G47.33 OSA (OBSTRUCTIVE SLEEP APNEA): Primary | ICD-10-CM

## 2019-01-22 DIAGNOSIS — M54.12 LEFT CERVICAL RADICULOPATHY: ICD-10-CM

## 2019-01-22 DIAGNOSIS — M50.30 DDD (DEGENERATIVE DISC DISEASE), CERVICAL: ICD-10-CM

## 2019-01-22 DIAGNOSIS — M06.00 SERONEGATIVE RHEUMATOID ARTHRITIS: ICD-10-CM

## 2019-01-22 DIAGNOSIS — Z98.1 S/P CERVICAL SPINAL FUSION: Primary | ICD-10-CM

## 2019-01-22 PROCEDURE — 99999 PR PBB SHADOW E&M-EST. PATIENT-LVL IV: ICD-10-PCS | Mod: PBBFAC,,, | Performed by: FAMILY MEDICINE

## 2019-01-22 PROCEDURE — 72040 XR CERVICAL SPINE AP LATERAL: ICD-10-PCS | Mod: 26,,, | Performed by: RADIOLOGY

## 2019-01-22 PROCEDURE — 72040 X-RAY EXAM NECK SPINE 2-3 VW: CPT | Mod: TC,FY

## 2019-01-22 PROCEDURE — 99999 PR PBB SHADOW E&M-EST. PATIENT-LVL II: ICD-10-PCS | Mod: PBBFAC,,, | Performed by: NEUROLOGICAL SURGERY

## 2019-01-22 PROCEDURE — 1101F PT FALLS ASSESS-DOCD LE1/YR: CPT | Mod: CPTII,S$GLB,, | Performed by: FAMILY MEDICINE

## 2019-01-22 PROCEDURE — 99024 POSTOP FOLLOW-UP VISIT: CPT | Mod: S$GLB,,, | Performed by: NEUROLOGICAL SURGERY

## 2019-01-22 PROCEDURE — 99214 PR OFFICE/OUTPT VISIT, EST, LEVL IV, 30-39 MIN: ICD-10-PCS | Mod: S$GLB,,, | Performed by: FAMILY MEDICINE

## 2019-01-22 PROCEDURE — 99999 PR PBB SHADOW E&M-EST. PATIENT-LVL II: CPT | Mod: PBBFAC,,, | Performed by: NEUROLOGICAL SURGERY

## 2019-01-22 PROCEDURE — 99999 PR PBB SHADOW E&M-EST. PATIENT-LVL IV: CPT | Mod: PBBFAC,,, | Performed by: FAMILY MEDICINE

## 2019-01-22 PROCEDURE — 99024 PR POST-OP FOLLOW-UP VISIT: ICD-10-PCS | Mod: S$GLB,,, | Performed by: NEUROLOGICAL SURGERY

## 2019-01-22 PROCEDURE — 99214 OFFICE O/P EST MOD 30 MIN: CPT | Mod: S$GLB,,, | Performed by: FAMILY MEDICINE

## 2019-01-22 PROCEDURE — 1101F PR PT FALLS ASSESS DOC 0-1 FALLS W/OUT INJ PAST YR: ICD-10-PCS | Mod: CPTII,S$GLB,, | Performed by: FAMILY MEDICINE

## 2019-01-22 PROCEDURE — 72040 X-RAY EXAM NECK SPINE 2-3 VW: CPT | Mod: 26,,, | Performed by: RADIOLOGY

## 2019-01-22 NOTE — PROGRESS NOTES
CHIEF COMPLAINT:    6-8 week post-op/C4-5 and C5-6 ACDF    HPI:    Franko Buchanan is a 68 y.o.-year-old male who presents today for post-operative follow-up. He is s/p C4-5 and C5-6 ACDF  that was done by Dr. Hitchcock on 11/28/2018. Currently, the patient's symptoms are better since surgery. The patient is complaining of soreness in the neck and left arm. He denies any new onset of weakness. The patient describes the pain as dull. The patient rates the pain 3 on the pain scale. The pain is worse with some activity and better with lying flat in bed. Post-op medications the patient is currently taking are: Oxycodone 10-325mg as needed for pain.    ROS:    NAD    PE:    AAOX3  PERRL  EOMI    Cervical incision:  C/D/I    ROM:   Decreased secondary to pain    Sensation:  Intact to light touch (All 4 extremities)    Strength: Full strength except left deltoid 4/5      Deltoids Biceps Triceps Wrist Ext. Wrist Flex. Hand    RUE         LUE          Hip Flex. Knee Flex. Knee Ext. Dorsi Flex Plantar Flex EHL   RLE         LLE           Gait:  normal    DTR:  2+ and symmetric bilaterally     IMAGING:    XRays: C-spine- ACDF C4-5 and C5-6 with good placement of spacers and instrumentation    CT: none    MRI: none    ASSESSMENT:   Doing better post op. Improving arm weakness    PLAN:   Physical therapy for arm weakness. CT C-spine at 6 months to assess progression of spinal fusion.

## 2019-01-22 NOTE — PROGRESS NOTES
(Portions of this note were dictated using voice recognition software and may contain dictation related errors in spelling/grammar/syntax not found on text review)    CC:   Chief Complaint   Patient presents with    Follow-up     CPAP re-cert       HPI: 68 y.o. male Annual exam in August.  Sleep apnea, had repeat titration study done in September, suggestion of a Pap 8-12 cm of water given some fluctuating CPAP requirements through the night.  A Pap was ordered  Apap usage:  At least 7-8 hours per night, nightly.  Symptoms with therapy:  No adverse events noted  ALLAN symptoms while on APAP.  No morning headaches.  Still gets some fatigue throughout the day, believes it could be from other factors like work stress or pain from arthritis    RA: currently off meds, stable    Fatigue issues d/w pt at last appt. ddx including allan related sx versus depression with mx stressors (wife's illness, work stress, dogs' illness) . Increased cymbalta (taking initially for chronic DJD pain) to 60 mg daily .  Feels like this helps.    In the interim had a C4-5 and C5-6 ACDF in November 2018.  Had a chest x-ray done preop that demonstrated perihilar interstitial prominence nonspecific.  Had a follow-up x-ray done showing normal hilar contours.  Normal cardiac silhouette.  Has stable left hemidiaphragm elevation with no focal consolidation.  Overall doing well from surgery    Right hip pain, known DJD.  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    Sleep apnea        Past Surgical History:   Procedure Laterality Date    BACK SURGERY      1990's and 2009; last by Naldo    C4-5 and C5-6 ACDF  (DISCECTOMY , SPINE , CERVICAL, ANTERIOR APPROACH W/FUSION) Right 11/28/2018    [facility]    COLONOSCOPY N/A 1/30/2018    [facility]    COLONOSCOPY N/A 12/14/2012    [facility]ue [facility] at [facility] (4TH FLR)    COSMETIC SURGERY      Focal Laser       Right eye    HERNIA REPAIR Right     inguinal    SKIN CANCER EXCISION      SPINE SURGERY      l3-4/ l5-s1  (x 2)    TONSILLECTOMY         Family History   Problem Relation Age of Onset    Colon polyps Father     Cancer Father         leukemia    Glaucoma Mother     Thyroid disease Sister         hashimoto    Cancer Sister         renal    No Known Problems Sister     Heart failure Maternal Grandfather     Cataracts Maternal Grandmother     Alzheimer's disease Maternal Grandmother     No Known Problems Maternal Aunt     No Known Problems Maternal Uncle     No Known Problems Paternal Aunt     No Known Problems Paternal Uncle     No Known Problems Paternal Grandmother     Cancer Paternal Grandfather         colon    Diabetes Neg Hx     Heart disease Neg Hx     Amblyopia Neg Hx     Blindness Neg Hx     Macular degeneration Neg Hx     Retinal detachment Neg Hx     Strabismus Neg Hx     Hypertension Neg Hx     Stroke Neg Hx        Social History     Socioeconomic History    Marital status:      Spouse name: Joyce    Number of children: Not on file    Years of education: Not on file    Highest education level: Not on file   Social Needs    Financial resource strain: Not on file    Food insecurity - worry: Not on file    Food insecurity - inability: Not on file    Transportation needs - medical: Not on file    Transportation needs - non-medical: Not on file   Occupational History    Occupation: [employer]     Employer: [employer]   Tobacco Use    Smoking status: Never Smoker    Smokeless tobacco: Never Used   Substance and Sexual  Activity    Alcohol use: Yes     Alcohol/week: 3.0 oz     Types: 6 Standard drinks or equivalent per week     Comment: 3 X WEEK     Drug use: No    Sexual activity: Not on file   Other Topics Concern    Not on file   Social History Narrative    Not on file     Lab Results   Component Value Date    WBC 8.54 11/05/2018    HGB 13.4 (L) 11/05/2018    HCT 40.4 11/05/2018     11/05/2018    CHOL 168 08/01/2018    TRIG 72 08/01/2018    HDL 46 08/01/2018    ALT 34 11/05/2018    AST 43 (H) 11/05/2018     11/05/2018    K 3.9 11/05/2018     11/05/2018    CREATININE 0.9 11/05/2018    CALCIUM 9.9 11/05/2018    ALBUMIN 4.0 11/05/2018    BUN 15 11/05/2018    CO2 28 11/05/2018    TSH 1.818 08/01/2018    PSA 0.49 08/01/2018    INR 1.0 11/20/2018    HGBA1C 5.2 08/01/2018    LDLCALC 107.6 08/01/2018    GLU 72 11/05/2018           ROS:  GENERAL:  Above  SKIN: No rashes, no itching.  HEAD: No headaches.  EYES: No visual changes  EARS: No ear pain or changes in hearing.  NOSE: No congestion or rhinorrhea.  MOUTH & THROAT: No hoarseness, change in voice, or sore throat.  NODES: Denies swollen glands.  CHEST: Denies YAN, cyanosis, wheezing, cough and sputum production.  CARDIOVASCULAR: Denies chest pain, PND, orthopnea.  ABDOMEN: No nausea, vomiting, or changes in bowel function.  URINARY: No flank pain, dysuria or hematuria.  PERIPHERAL VASCULAR: No claudication or cyanosis.  MUSCULOSKELETAL:  Above  NEUROLOGIC: No weakness or numbness.    Vital signs reviewed  PE:   APPEARANCE: Well nourished, well developed, in no acute distress.    HEAD: Normocephalic, atraumatic.  EYES: PERRL. EOMI.   Conjunctivae noninjected.  EARS: TM's intact. Light reflex normal. No retraction or perforation  NOSE: Mucosa pink. Airway clear.  MOUTH & THROAT: No tonsillar enlargement. No pharyngeal erythema or exudate.   NECK: Supple with no cervical lymphadenopathy.  No carotid bruits.  No thyromegaly  CHEST: Good inspiratory effort. Lungs  clear to auscultation with no wheezes or crackles.  CARDIOVASCULAR: Normal S1, S2. No rubs, murmurs, or gallops.  ABDOMEN: Bowel sounds normal. Not distended. Soft. No tenderness or masses. No organomegaly.  EXTREMITIES: No edema  MSK:  Right hip restriction to internal rotation and some discomfort in the groin to external rotation consistent with his known DJD.  He does have some trochanteric tenderness on the right as well (states that he had a bout of trochanteric bursitis and received a steroid shot from Orthopedics in the past which seemed to treat the problem)    IMPRESSION  1. ALLAN (obstructive sleep apnea)    2. Seronegative rheumatoid arthritis    3. DDD (degenerative disc disease), cervical    4. Left cervical radiculopathy            PLAN  ALLAN, compliant with auto Pap nightly around 7-8 hours a night.  Feels like he sleeps well with his overall, has no significant adverse events from the treatment.    He can consult with his [facility] sleep company to have the compliance report downloaded and sent to his insurance company for verification of use.  This note will serve to document the patient has report of his proper use of the therapy    Recovering well after his anterior cervical diskectomy and fusion.    Right hip degenerative disease with pain.  Appointment upcoming with Orthopedics           HEALTH SCREENINGS  Immunizations:  Tdap 2014  PCV 10/17/17  PPSV 2018     Age/Gender Appropriate screenings:  Prostate screening : VERONICA 2017, PSA as above 0.49 in 2018  Colonoscopy 2018.  Internal hemorrhoids, otherwise normal.  Return in 5 years

## 2019-01-23 ENCOUNTER — PATIENT MESSAGE (OUTPATIENT)
Dept: FAMILY MEDICINE | Facility: CLINIC | Age: 69
End: 2019-01-23

## 2019-01-23 ENCOUNTER — HOSPITAL ENCOUNTER (OUTPATIENT)
Dept: RADIOLOGY | Facility: HOSPITAL | Age: 69
Discharge: HOME | End: 2019-01-23
Attending: ORTHOPAEDIC SURGERY
Payer: COMMERCIAL

## 2019-01-23 DIAGNOSIS — M25.551 RIGHT HIP PAIN: Primary | ICD-10-CM

## 2019-01-23 DIAGNOSIS — M25.551 RIGHT HIP PAIN: ICD-10-CM

## 2019-01-23 PROCEDURE — 73502 XR HIP 2 VIEW RIGHT: ICD-10-PCS | Mod: 26,RT,, | Performed by: RADIOLOGY

## 2019-01-23 PROCEDURE — 73502 X-RAY EXAM HIP UNI 2-3 VIEWS: CPT | Mod: TC,FY,RT

## 2019-01-23 PROCEDURE — 73502 X-RAY EXAM HIP UNI 2-3 VIEWS: CPT | Mod: 26,RT,, | Performed by: RADIOLOGY

## 2019-01-24 ENCOUNTER — HOSPITAL ENCOUNTER (OUTPATIENT)
Dept: RADIOLOGY | Facility: HOSPITAL | Age: 69
Discharge: HOME | End: 2019-01-24
Attending: ORTHOPAEDIC SURGERY
Payer: COMMERCIAL

## 2019-01-24 ENCOUNTER — OFFICE VISIT (OUTPATIENT)
Dept: ORTHOPEDICS | Facility: CLINIC | Age: 69
End: 2019-01-24
Payer: COMMERCIAL

## 2019-01-24 VITALS
DIASTOLIC BLOOD PRESSURE: 88 MMHG | HEART RATE: 56 BPM | BODY MASS INDEX: 30.45 KG/M2 | SYSTOLIC BLOOD PRESSURE: 150 MMHG | WEIGHT: 230.81 LBS | TEMPERATURE: 98 F

## 2019-01-24 DIAGNOSIS — M47.26 OSTEOARTHRITIS OF SPINE WITH RADICULOPATHY, LUMBAR REGION: Primary | ICD-10-CM

## 2019-01-24 DIAGNOSIS — M43.9 DEFORMING DORSOPATHY: ICD-10-CM

## 2019-01-24 PROCEDURE — 99204 OFFICE O/P NEW MOD 45 MIN: CPT | Mod: S$GLB,,, | Performed by: ORTHOPAEDIC SURGERY

## 2019-01-24 PROCEDURE — 72148 MRI LUMBAR SPINE WITHOUT CONTRAST: ICD-10-PCS | Mod: 26,,, | Performed by: RADIOLOGY

## 2019-01-24 PROCEDURE — 99999 PR PBB SHADOW E&M-EST. PATIENT-LVL III: CPT | Mod: PBBFAC,,, | Performed by: ORTHOPAEDIC SURGERY

## 2019-01-24 PROCEDURE — 1101F PR PT FALLS ASSESS DOC 0-1 FALLS W/OUT INJ PAST YR: ICD-10-PCS | Mod: CPTII,S$GLB,, | Performed by: ORTHOPAEDIC SURGERY

## 2019-01-24 PROCEDURE — 99204 PR OFFICE/OUTPT VISIT, NEW, LEVL IV, 45-59 MIN: ICD-10-PCS | Mod: S$GLB,,, | Performed by: ORTHOPAEDIC SURGERY

## 2019-01-24 PROCEDURE — 1101F PT FALLS ASSESS-DOCD LE1/YR: CPT | Mod: CPTII,S$GLB,, | Performed by: ORTHOPAEDIC SURGERY

## 2019-01-24 PROCEDURE — 72148 MRI LUMBAR SPINE W/O DYE: CPT | Mod: TC

## 2019-01-24 PROCEDURE — 72148 MRI LUMBAR SPINE W/O DYE: CPT | Mod: 26,,, | Performed by: RADIOLOGY

## 2019-01-24 PROCEDURE — 99999 PR PBB SHADOW E&M-EST. PATIENT-LVL III: ICD-10-PCS | Mod: PBBFAC,,, | Performed by: ORTHOPAEDIC SURGERY

## 2019-01-24 NOTE — LETTER
January 24, 2019      Ines Strong MD  [address]  [address]  [facility] [address]           [facility]  [address]  [facility] [address]  Phone: [phone]          Patient: Franko Buchanan   MR Number: [id number]   YOB: 1950   Date of Visit: 1/24/2019       Dear Dr. Ines Strong:    Thank you for referring Franko Buchanan to me for evaluation. Attached you will find relevant portions of my assessment and plan of care.    If you have questions, please do not hesitate to call me. I look forward to following Franko Buchanan along with you.    Sincerely,    Yadira Shultz, LPN    Enclosure  CC:  No Recipients    If you would like to receive this communication electronically, please contact [email or web address] or [phone] to request more information on [facility] access.    For providers and/or their staff who would like to refer a patient to [facility], please contact us through our one-stop-shop provider referral line, [facility] , at [phone].    If you feel you have received this communication in error or would no longer like to receive these types of communications, please e-mail [email or web address]

## 2019-01-24 NOTE — PROGRESS NOTES
HPI:    Patient is here today for a chief complaint of right hip pain x 10 years progressively worsening over the last year. 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 He says that he's felt worsening stiffness and difficulty with ambulating.  He also has an extensive history of cervical and lumbar back pain. He reports some mild shooting pain down the right leg    Duration: 10 years  Intensity: moderate  Character of pain: sharp  Location: He reports that the pain is predominately  Around the buttocks with no pain in the groin  Patient pain increases with activity.  Pain is worse with weightbearing and the pain interferes with activities of daily living.    He has tried conservative management including NSAIDS and activity modification without relief.        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    Sleep apnea           Current Outpatient Medications:     calcium carbonate 220 mg capsule, Take 250 mg by mouth 2 (two) times daily with meals., Disp: , Rfl:     cholecalciferol, vitamin D3, 2,000 unit Cap, Take 1 capsule by mouth once daily., Disp: , Rfl:     DULoxetine (CYMBALTA) 60 MG capsule, Take 1 capsule (60 mg total) by mouth once daily., Disp: 90 capsule, Rfl: 3    ferrous sulfate 325 (65 FE) MG EC tablet, Take 325 mg by mouth as needed., Disp: , Rfl:     flecainide (TAMBOCOR) 150 MG Tab, Take 2 tablets (300 mg total) by mouth daily as needed., Disp: 20 tablet, Rfl: 6    luliconazole (LUZU) 1 % Crea, Apply 1 application topically once daily., Disp: 60 g, Rfl: 3    metoprolol succinate (TOPROL-XL) 25 MG 24 hr tablet, Take 1 tablet (25 mg total) by mouth once daily., Disp: 90 tablet, Rfl: 3     MULTIVITAMIN W-MINERALS/LUTEIN (CENTRUM SILVER ORAL), Take by mouth., Disp: , Rfl:     omega-3 fatty acids-vitamin E (SUPER EPA) 1,000-5 mg-unit Cap, Take 1 capsule by mouth once daily. , Disp: , Rfl:     oxyCODONE-acetaminophen (PERCOCET)  mg per tablet, Take 1 tablet by mouth every 6 (six) hours as needed (pain 8-10/10)., Disp: 60 tablet, Rfl: 0    triamcinolone acetonide 0.1% (KENALOG) 0.1 % cream, AAA bid, Disp: 60 g, Rfl: 3    diazePAM (VALIUM) 5 MG tablet, Take 1 tablet (5 mg total) by mouth every 6 (six) hours as needed (muscle spasm)., Disp: 60 tablet, Rfl: 0     Review of patient's allergies indicates:  No Known Allergies       ROS  Constitutional: Negative for fever, Negative for weight loss  HENT Negative for congestion  Cardiovascular: Negative chest pain  Respiratory: Negative Shortness of breath  Heme: Negative excessive bleeding  Skin:NegativeItching, Negative breakdown  Musculoskeletal:Positive for back pain, Positive for joint pain, Negative muscle pain, Negative muscle weakness  Neurological: Negative for numbness and paresthesias   Psychiatric/Behavioral: Negative altered mental status, Negative for depression    Physical Exam:   BP (!) 150/88   Pulse (!) 56   Temp 98.3 °F (36.8 °C)   Wt 104.7 kg (230 lb 13.2 oz)   BMI 30.45 kg/m²   General appearance: This is a well-developed, Well nourished male No obvious acute distress.  Psychiatric: normal mood and affect;  pleasant and conversant; judgment and thought content normal  Gait is coordinated. Patient has antalgic gait to the right  Cardiovascular: There are no swelling or varicosities present.   Respiratory: normal respiratory effort   Lymphatic: no adenopathy   Neurologic: alert and oriented to person, place, and time   Deep Tendon Reflexes are normal;  Coordination and Balance: Normal   Musculoskeletal   Neck    ROM shows normal flexion and extension and lateral rotation    Palpation: Non-tender    Stability is normal    Strength  is normal    Skin is normal without masses and lesions    Sensation is intact to light touch   Back   There is Mild back tenderness.   ROM showsnot examined flexion, extension and  rotation    Palpation shows no masses    Stability is normal    Strength to flexion and extension well maintained    Core strength is diminished    Skin shows no rashes or cafe au lait spots;     Sensation is intact to light touch    The Right hip is examined and ROM show flexion 110 , internal rotation 35, external rotation 35. These passive ranges of motion are painful No   Straight leg raising is negative.      The Left hip is examined and ROM show flexion 110 , internal rotation 35, external rotation 35. These passes range is some motion are painful No.  Straight leg raising is negative.      Xrays show severe degenerative changes including subchondral sclerosis, periarticular osteophytes and joint space narrowing.     The bone loss is already quite significant and physical therapy  is contraindicated due to potential joint destruction.     Assessment/Plan:  Hip arthritis right but very mild and unchanged from 2 years ago  Patient's pain is consistent with pathology of the spine. Will get MRI and evaluated by pain management considering the description of his pain and history of spinal issues. Will reassess patient if his pain persists despite intervention by pain management.  Discussed this extensively with the patient and he agrees with this plan.

## 2019-02-01 ENCOUNTER — TELEPHONE (OUTPATIENT)
Dept: SPINE | Facility: CLINIC | Age: 69
End: 2019-02-01

## 2019-02-01 NOTE — TELEPHONE ENCOUNTER
Mr Buchanan says he was seen by Dr [facility] last week and an MRI of Lumbar was order for his lower back to evaluate his hip pain.   Patient says Dr [facility] feels his problem is his lower back and not his hip.  Patient would like for Dr Hitchcock to review his MRI result and come up with a treatment plan.  Patient informed that Dr Hitchcock has clinic on this coming Tuesday.  Understanding verbalized by patient.

## 2019-02-01 NOTE — TELEPHONE ENCOUNTER
----- Message from Marcel Jerez sent at 2/1/2019  2:06 PM CST -----  Contact: self  Needs Advice    Reason for call: MRI         Communication Preference: Phone      Additional Information: Pt states he had a MRI done by Dr [facility] and would like to discuss the results w/ dr Hitchcock

## 2019-02-11 ENCOUNTER — TELEPHONE (OUTPATIENT)
Dept: SPINE | Facility: CLINIC | Age: 69
End: 2019-02-11

## 2019-02-11 DIAGNOSIS — M54.50 LUMBAR PAIN: Primary | ICD-10-CM

## 2019-02-12 ENCOUNTER — TELEPHONE (OUTPATIENT)
Dept: SPINE | Facility: CLINIC | Age: 69
End: 2019-02-12

## 2019-02-12 DIAGNOSIS — Z98.1 S/P CERVICAL SPINAL FUSION: ICD-10-CM

## 2019-02-12 DIAGNOSIS — G89.29 CHRONIC BILATERAL LOW BACK PAIN, WITH SCIATICA PRESENCE UNSPECIFIED: Primary | ICD-10-CM

## 2019-02-12 DIAGNOSIS — R29.898 LEFT ARM WEAKNESS: ICD-10-CM

## 2019-02-12 DIAGNOSIS — M54.5 CHRONIC BILATERAL LOW BACK PAIN, WITH SCIATICA PRESENCE UNSPECIFIED: Primary | ICD-10-CM

## 2019-02-12 NOTE — TELEPHONE ENCOUNTER
For his neck or low back?    Florence Payan, MEAGAN, PA-C  Neurosurgery  [facility]  [facility]

## 2019-02-12 NOTE — TELEPHONE ENCOUNTER
----- Message from Ines Linder sent at 2/12/2019  8:58 AM CST -----  Contact: YANE LAW [[id number]]  Name of Who is Calling: YANE LAW [[id number]]    What is the request in detail: Please call the patient regarding a referral for PT. Patient would like a call back today.       Can the clinic reply by [facility]:no      What Number to Call Back if not in [facility]: [phone]

## 2019-02-13 ENCOUNTER — PATIENT MESSAGE (OUTPATIENT)
Dept: SPINE | Facility: CLINIC | Age: 69
End: 2019-02-13

## 2019-02-13 NOTE — TELEPHONE ENCOUNTER
Please call patient and tell him that neck and low back physical therapy [facility].    MEAGAN Bingham, PA-C  Neurosurgery  [facility]  [facility]

## 2019-02-26 ENCOUNTER — CLINICAL SUPPORT (OUTPATIENT)
Dept: REHABILITATION | Facility: HOSPITAL | Age: 69
End: 2019-02-26
Attending: PHYSICIAN ASSISTANT
Payer: COMMERCIAL

## 2019-02-26 DIAGNOSIS — M25.60 STIFFNESS OF UNSPECIFIED JOINT, NOT ELSEWHERE CLASSIFIED: ICD-10-CM

## 2019-02-26 DIAGNOSIS — M43.6 STIFFNESS OF CERVICAL SPINE: ICD-10-CM

## 2019-02-26 DIAGNOSIS — R26.2 DIFFICULTY WALKING: ICD-10-CM

## 2019-02-26 DIAGNOSIS — R29.898 WEAKNESS OF LEFT UPPER EXTREMITY: Primary | ICD-10-CM

## 2019-02-26 DIAGNOSIS — R29.898 WEAKNESS OF RIGHT LOWER EXTREMITY: ICD-10-CM

## 2019-02-26 DIAGNOSIS — M62.81 MUSCLE WEAKNESS: ICD-10-CM

## 2019-02-26 PROCEDURE — 97162 PT EVAL MOD COMPLEX 30 MIN: CPT

## 2019-02-26 NOTE — PLAN OF CARE
[facility]  [facility] Initial Evaluation    Name: Franko Buchanan  Clinic Number: [id number]    Therapy Diagnosis:   Encounter Diagnoses   Name Primary?    Weakness of left upper extremity Yes    Weakness of right lower extremity     Muscle weakness     Stiffness of cervical spine     Stiffness of unspecified joint, not elsewhere classified     Difficulty walking      Physician: Florence Payan, *    Physician Orders: PT Eval and Treat   Medical Diagnosis from Referral:   M54.5,G89.29 (ICD-10-CM) - Chronic bilateral low back pain, with sciatica presence unspecified   Z98.1 (ICD-10-CM) - S/P cervical spinal fusion   R29.898 (ICD-10-CM) - Left arm weakness   Evaluation Date: 2/26/2019  Authorization Period Expiration: 12-    Plan of Care Expiration: 05-  Visit # / Visits authorized: 1/30    Time In: 11:00 AM  Time Out: 12:05 AM  Total Billable Time: 65 minutes    Precautions: Standard and A-fib, Sleep Apnea, Cervical Fusion (Anterior)    Subjective     Date of onset: UE Issues since surgery in December, LE's Issues over last 6 months  History of current condition - Franko reports: that he had anterior cervical fusion in December 2018 and had weakness in his LUE and had difficulty dressing himself and getting his arm to an overhead position. He still says he has difficulty with manipulating things and drops things at times. He also states that he's had several lumbar discectomy's. He stated that he had some R hip pain but was told by his MD that this pain was being caused by his low back. He still states that his pain is primarily located in his R hip. He states his last discectomy was several years ago around 2013. He states his pain feels like it's in the R hip joint as it feels deep to him. He does get some lower back pain. He states his hip pain does go down the leg.The radiating pain goes down the back of the leg and feels like it's right in the  center of the leg. Staying on his feet throughout the day and this seems to cause the radiating leg pain. He also walks his dogs and this will increase his R leg pain. This pain does slowly dissipate as he rests at home. He does say elevating his leg does seem to make him feel better. He gets up in the morning with a sore back. He describes this as soreness in the low back but does get better as he walks around, but his R hip and LE pain is exacerbated with walking. He states that the pain he gets in the RLE is like a dull aching pain like he overdid it but is unable to physically push on this pain and recreate it.    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    Franko has a current medication list which includes the following prescription(s): calcium carbonate, cholecalciferol (vitamin d3), diazepam, duloxetine, ferrous sulfate, flecainide, luliconazole, metoprolol succinate, folic acid/multivit-min/lutein, omega-3 fatty acids-vitamin e, oxycodone-acetaminophen, and triamcinolone acetonide 0.1%.    Review of patient's allergies indicates:  No Known Allergies     Imaging, MRI studies, X-Rays:   FINDINGS:  Alignment: Grade 1 anterolisthesis noted at L5-S1.    Vertebrae: Multilevel degenerative endplate changes are noted.  No evidence for recent fracture or marrow infiltrative process.    Discs: There is multilevel moderate to severe disc height  loss.    Cord: Normal.  Conus terminates at T12-L1.    Degenerative findings:    T12-L1: No spinal canal stenosis or neural foraminal narrowing.    L1-L2: Left paracentral disc protrusion and mild bilateral facet arthropathy result in mild narrowing of the spinal canal and left lateral recess as well as moderate left, mild right neural foraminal narrowing.    L2-L3: Circumferential disc bulge and mild bilateral facet arthropathy result in moderate spinal canal stenosis and mild bilateral neural foraminal narrowing.    L3-L4: Suspected left laminectomy.  Circumferential disc bulge and mild bilateral facet arthropathy result in severe bilateral neural foraminal narrowing.    L4-L5: Circumferential disc bulge and severe bilateral facet arthropathy result in severe spinal canal stenosis and severe bilateral neural foraminal narrowing.    L5-S1: Suspected left laminectomy.  Uncovering of the intervertebral disc and severe bilateral facet arthropathy result in moderate bilateral neural foraminal narrowing.    Paraspinal muscles & soft tissues: Unremarkable.  Note made of probable bilateral renal cysts.      Impression       1. Multilevel degenerative changes of the lumbar spine as detailed above.  Overall appearance is similar to prior study.      Electronically signed by: Wale Adams MD  Date: 01/25/2019  Time: 08:52     FINDINGS:  Right hip joint space loss with osteophytosis noted, consistent with degenerative changes.  No fracture.  No marrow replacement process.  Alignment is within normal limits.      Impression       Degenerative changes, similar to the 04/03/2017 exam..      Electronically signed by: Ashvin Diop MD  Date: 01/23/2019  Time: 13:26     Prior Therapy: Therapy for cervical spine prior to his fusion surgery, none for lower extremity  Social History: Lives at home, 2-story lives with their spouse  Occupation: OBGYN  Prior Level of Function: Independent prior  Current Level of Function: Difficulty  with manipulation and overhead motions, difficulty standing and walking for long period    Pain:  Current 0/10, worst 9/10, best 0/10   Location: R Buttocks & Posterior Thigh and LUE  Description: Aching and Dull  Aggravating Factors: Standing, Walking, Lifting and Manipulation  Easing Factors: relaxation and rest    Pts goals: that he would like to improve his strength in his LUE in order to manipulate objects better as well as decrease the pain in his RLE so he can stand and walk for longer periods    Objective     Joint Mobility:   LUMBAR Degrees Symptoms   P/A Mobs 2/6   Tight capsule end-feel no pain   Flexion Mobs 2/6   Tight capsule end-feel no pain   Side-Bending Mobs 2/6 Tight capsule end-feel no pain   Rotational Mobs 2/6 Tight capsule end-feel no pain     Cervical Active Range of Motion:    Degrees   Flexion 45   Extension 0   Right Rotation 30   Left Rotation 30   Right Side Bending 10   Left Side Bending 10      Lumbar Active Range of Motion:    Degrees Symptoms   Flexion WFL   Good spinal curvature   Extension 3   Central low back pain   Left Side Bending 5 Low back pain   Right Side Bending 5 R low back RLE pain   Left Rotation   50% No pain   Right Rotation   50% R low back and RLE pain      Upper Extremity Strength   Right Left   Shoulder Flexion 5/5 3+/5   Shoulder Abduction 5/5 3+/5   Shoulder ER 4+/5 3+/5   Shoulder IR 5/5 5/5   Elbow Flexion 5/5 4+/5   Elbow Extension 5/5 4+/5   Wrist Flexion 5/5 5/5   Wrist Extension 5/5 5/5   Dynamometer  62.5 lbs.2 52.5 lbs.     Flexibility:    Right Left   Piriformis Test Positive Positive       Special Tests:   Right Left   CHAKA Negative Negative   FADDIR Negative Negative   Scour Test Negative Negative   Thigh Thrust Test Negative Negative   SI Compression Negative Negative   SI Distraction Negative Negative   Repeated Extension Prone Negative Negative     Sensation:   Right Left   C4 (Upper Traps) Negative Negative   C5 (Lateral Deltoid) Negative  Negative   C6 (Thenar Eminence) Negative Negative   C7 (Palmar 3rd Digit) Negative Negative   C8 (Palmar Medial 5th Digit) Negative Negative   T1 (Anteromedial Forearm) Negative Negative   T2 (Medial Upper Arm) Negative Positive      Right Left   L2 (Superior Anterior Lateral Thigh) Positive Negative   L3 (Anterior Medial Knee) Negative Negative   L4 (Anterior Medial Lower Leg) Negative Negative   L5 (Anterior Lateral Ankle) Negative Negative   S1 (Posterior Calcaneus) Negative Negative   S2 (Posterior Thigh) Negative Negative     Myotomes:   Right Left Positive Muscles   C4 (Levator Scapula) Negative Positive All   C5 (Deltoid & Infraspinatus) Negative Positive All   C6 (Biceps,  Subscapularis, ECRL) Negative Positive All   C7 (Triceps & FCU) Negative Positive All   C8 (EPL & OPL) Negative Positive All   T1 (Interossei) Negative Positive All      Right Left Positive Muscles   L2 (Psoas) Positive Positive Psoas   L3 (Quadriceps & Adductors) Positive Positive All   L4 (Tibialis Anterior) Positive Positive All   L5 (Glute Medius & EHL) Positive Positive All   S1 (Gastroc & Hamstrings) Positive Positive All   S2 (Gluteus Daniel) Positive Positive All     Deep Tendon Reflexes:   Right Left   Biceps 0 0   Triceps 0 0   Brachioradialis 0 0     Reflexes Right Left Comment   L3 - L4 (Patellar Tendon) 2+ 2+ N/A   S1 (Achilles) 2+ 2+ N/A     Neurodynamic testing:   Right Left   UNT Negative Positive   MNT Negative Positive   RNT Negative Positive     Lower Limb Tension Tests Right Left   SLR Positive Negative   Sciatic Nerve Glide Positive Negative   Dural Tension Test Positive Negative       CMS Impairment/Limitation/Restriction for FOTO Combo Survey    Therapist reviewed FOTO scores for Franko Buchanan on 2/26/2019.   FOTO documents entered into [employer] - see Media section.    Limitation Score: 40%  Category: Mobility    Current : CK = at least 40% but < 60% impaired, limited or restricted  Goal: CI = at least 1% but < 20%  impaired, limited or restricted       Assessment   Franko is a 68 y.o. male referred to outpatient Physical Therapy with a medical diagnosis of   M54.5,G89.29 (ICD-10-CM) - Chronic bilateral low back pain, with sciatica presence unspecified   Z98.1 (ICD-10-CM) - S/P cervical spinal fusion   R29.898 (ICD-10-CM) - Left arm weakness   . Pt presents with signs and symptoms consistent with his medical diagnoses. He has lumbar and cervical joint hypomobility, decreased cervical and lumbar AROM, poor mechanics with AROM in both the cervical and lumbar spine. He has impaired myotomes in BLE's, with the R more impaired than L, impaired sensation in the L2 dermatome, and decreased strength in the LUE.    Pt prognosis is Fair.   Pt will benefit from skilled outpatient Physical Therapy to address the deficits stated above and in the chart below, provide pt/family education, and to maximize pt's level of independence.     Plan of care discussed with patient: Yes  Pt's spiritual, cultural and educational needs considered and patient is agreeable to the plan of care and goals as stated below:     Anticipated Barriers for therapy: None    Medical Necessity is demonstrated by the following  History  Co-morbidities and personal factors that may impact the plan of care Co-morbidities:   See PMH for co-morbidities    Personal Factors:   no deficits     moderate   Examination  Body Structures and Functions, activity limitations and participation restrictions that may impact the plan of care Body Regions:   neck  back  lower extremities  upper extremities    Body Systems:    gross symmetry  ROM  strength  gross coordinated movement  motor control    Participation Restrictions:   None    Activity limitations:   Learning and applying knowledge  no deficits    General Tasks and Commands  no deficits    Communication  no deficits    Mobility  lifting and carrying objects  fine hand use (grasping/picking up)  walking    Self care  washing  oneself (bathing, drying, washing hands)  dressing    Domestic Life  shopping  cooking  doing house work (cleaning house, washing dishes, laundry)    Interactions/Relationships  no deficits    Life Areas  employment    Community and Social Life  community life  recreation and leisure         moderate   Clinical Presentation evolving clinical presentation with changing clinical characteristics moderate   Decision Making/ Complexity Score: moderate     Goals:  Short Term Goals: 5 weeks   1. Patient will demonstrate improved lumbar AROM to below levels in order to improve mobility during ambulation and other daily activities:  Extension 10     Left Side Bending 10   Right Side Bending 10   Left Rotation   85%   Right Rotation   85%   2. Patient will demonstrate improved LUE strength to below levels in order to improve ability to manipulate objects on a daily basis:   Left   Shoulder Flexion 4/5   Shoulder Abduction 4/5   Shoulder ER 4/5   Dynamometer  62.5 lbs.   3. Patient will report being able to stand and walk for 1 hour or longer without increased pain in order to improve community participation.  4. Patient will demonstrate improved functional ability by showing 30% or less limitation according to the FOTO.    Long Term Goals: 10 weeks   1. Patient will demonstrate improved lumbar AROM to below levels in order to improve mobility during ambulation and other daily activities:  Left Side Bending 15   Right Side Bending 15   Left Rotation   75%   Right Rotation   75%   2. Patient will demonstrate improved LUE strength to below levels in order to improve ability to manipulate objects on a daily basis:   Left   Shoulder Flexion 4-/5   Shoulder Abduction 4-/5   Shoulder ER 4-/5   Elbow Flexion 5/5   Elbow Extension 5/5   Dynamometer  57.5 lbs.   3. Patient will report being able to stand and walk for 30 minutes or longer without increased pain in order to improve community participation.  4. Patient will  demonstrate improved functional ability by showing 20% or less limitation according to the FOTO.    Plan   Plan of care Certification: 2/26/2019 to 05-.    Outpatient Physical Therapy 2 times weekly for 10 weeks to include the following interventions: Electrical Stimulation (Russian Stimulation), Gait Training, Manual Therapy, Moist Heat/ Ice, Neuromuscular Re-ed, Patient Education, Therapeutic Activites, Therapeutic Exercise and Dry Needling.     Negrito Monsivais, PT

## 2019-03-08 ENCOUNTER — TELEPHONE (OUTPATIENT)
Dept: SPINE | Facility: CLINIC | Age: 69
End: 2019-03-08

## 2019-03-08 NOTE — TELEPHONE ENCOUNTER
----- Message from Cecille Gómez sent at 3/8/2019 12:57 PM CST -----  Contact: pt   Needs Advice    Reason for call: pt is calling to speak with the nurse to reschedule his appt on 3/12/2019 pt is asking to call him asap         Communication Preference: can you please call pt at [phone]    Additional Information: none    LORAINE

## 2019-03-09 ENCOUNTER — PATIENT MESSAGE (OUTPATIENT)
Dept: SPINE | Facility: CLINIC | Age: 69
End: 2019-03-09

## 2019-03-15 ENCOUNTER — CLINICAL SUPPORT (OUTPATIENT)
Dept: REHABILITATION | Facility: HOSPITAL | Age: 69
End: 2019-03-15
Attending: PHYSICIAN ASSISTANT
Payer: COMMERCIAL

## 2019-03-15 DIAGNOSIS — R26.2 DIFFICULTY WALKING: ICD-10-CM

## 2019-03-15 DIAGNOSIS — M25.60 STIFFNESS OF UNSPECIFIED JOINT, NOT ELSEWHERE CLASSIFIED: ICD-10-CM

## 2019-03-15 DIAGNOSIS — M62.81 MUSCLE WEAKNESS: ICD-10-CM

## 2019-03-15 DIAGNOSIS — M43.6 STIFFNESS OF CERVICAL SPINE: ICD-10-CM

## 2019-03-15 PROCEDURE — 97140 MANUAL THERAPY 1/> REGIONS: CPT

## 2019-03-15 PROCEDURE — 97110 THERAPEUTIC EXERCISES: CPT

## 2019-03-15 NOTE — PROGRESS NOTES
Physical Therapy Daily Treatment Note     Name: Franko Buchanan  Clinic Number: [id number]    Therapy Diagnosis:   Encounter Diagnoses   Name Primary?    Muscle weakness     Stiffness of cervical spine     Stiffness of unspecified joint, not elsewhere classified     Difficulty walking      Physician: Florence Payan, *    Visit Date: 3/15/2019    Physician Orders: PT Eval and Treat   Medical Diagnosis from Referral:   M54.5,G89.29 (ICD-10-CM) - Chronic bilateral low back pain, with sciatica presence unspecified   Z98.1 (ICD-10-CM) - S/P cervical spinal fusion   R29.898 (ICD-10-CM) - Left arm weakness   Evaluation Date: 2/26/2019  Authorization Period Expiration: 12-     Plan of Care Expiration: 05-  Visit # / Visits authorized: 2/30     Time In: 0908  Time Out: 1000  Total Billable Time: 52 minutes     Precautions: Standard and A-fib, Sleep Apnea, Cervical Fusion (Anterior)    Subjective     Pt reports: w/ soreness and stiffness in R hip and low back.  He was compliant with home exercise program.  Response to previous treatment: no adverse effects   Functional change: no change     Pain: 0/10  Location: bilateral back      Objective     Franko received therapeutic exercises to develop strength, endurance, ROM, flexibility, posture and core stabilization for 44 minutes including:  TA activation in hook lying 30 x 3 sec hold   PPT 3 x 10 3 sec hold   Bridges 3 x 10   LTR on SB 2 min as oziel   DKTC w/ SB 20 x 5 sec hold  As oziel     Shld flexion standing 3 x 8   scap retraction 30 x 3 sec hold   Shld abduction 3 x 18    Franko received the following manual therapy techniques: Soft tissue Mobilization were applied to the: L upper trap for 8 minutes, including:  STM L upper trap      Home Exercises Provided and Patient Education Provided     Education provided:   - Pt edu on proper exercise technique.      Written Home Exercises Provided: Patient instructed to cont prior HEP.  PTA added  Exercises  were reviewed and Franko was able to demonstrate them prior to the end of the session.  Franko demonstrated good  understanding of the education provided.       Assessment     Pt oziel tx well.  LB stiffness decreased after tx session.  Pt cont to lack core stability and L UE strength.  Pt showed good effort during therex.  Pt gets very fatigued w/ non weighted exercises for UE.  Pt needs cueing to prevent upper trap activation.    Franko is progressing well towards his goals.   Pt prognosis is Good.     Pt will continue to benefit from skilled outpatient physical therapy to address the deficits listed in the problem list box on initial evaluation, provide pt/family education and to maximize pt's level of independence in the home and community environment.     Pt's spiritual, cultural and educational needs considered and pt agreeable to plan of care and goals.     Anticipated barriers to physical therapy: none    Goals: Goals:  Short Term Goals: 5 weeks   1. Patient will demonstrate improved lumbar AROM to below levels in order to improve mobility during ambulation and other daily activities: (progressing, not met)   Extension 10      Left Side Bending 10   Right Side Bending 10   Left Rotation    85%   Right Rotation    85%   2. Patient will demonstrate improved LUE strength to below levels in order to improve ability to manipulate objects on a daily basis:(progressing, not met)     Left   Shoulder Flexion 4/5   Shoulder Abduction 4/5   Shoulder ER 4/5   Dynamometer  62.5 lbs.   3. Patient will report being able to stand and walk for 1 hour or longer without increased pain in order to improve community participation.(progressing, not met)   4. Patient will demonstrate improved functional ability by showing 30% or less limitation according to the FOTO.(progressing, not met)      Long Term Goals: 10 weeks   1. Patient will demonstrate improved lumbar AROM to below levels in order to improve mobility during  ambulation and other daily activities:(progressing, not met)   Left Side Bending 15   Right Side Bending 15   Left Rotation    75%   Right Rotation    75%   2. Patient will demonstrate improved LUE strength to below levels in order to improve ability to manipulate objects on a daily basis:(progressing, not met)     Left   Shoulder Flexion 4-/5   Shoulder Abduction 4-/5   Shoulder ER 4-/5   Elbow Flexion 5/5   Elbow Extension 5/5   Dynamometer  57.5 lbs.   3. Patient will report being able to stand and walk for 30 minutes or longer without increased pain in order to improve community participation.(progressing, not met)   4. Patient will demonstrate improved functional ability by showing 20% or less limitation according to the FOTO.(progressing, not met)       Plan     Cont to progress towards goals set by PT.  Work to increase trunk mobility and UE strength.      Luis Manuel Gonzalez, PTA

## 2019-03-19 ENCOUNTER — HOSPITAL ENCOUNTER (OUTPATIENT)
Dept: RADIOLOGY | Facility: HOSPITAL | Age: 69
Discharge: HOME | End: 2019-03-19
Attending: PHYSICIAN ASSISTANT
Payer: COMMERCIAL

## 2019-03-19 DIAGNOSIS — M54.50 LUMBAR PAIN: ICD-10-CM

## 2019-03-19 PROCEDURE — 72120 XR LUMBAR SPINE AP AND LAT WITH FLEX/EXT: ICD-10-PCS | Mod: 26,,, | Performed by: RADIOLOGY

## 2019-03-19 PROCEDURE — 72100 X-RAY EXAM L-S SPINE 2/3 VWS: CPT | Mod: 26,,, | Performed by: RADIOLOGY

## 2019-03-19 PROCEDURE — 72120 X-RAY BEND ONLY L-S SPINE: CPT | Mod: TC,FY

## 2019-03-19 PROCEDURE — 72100 XR LUMBAR SPINE AP AND LAT WITH FLEX/EXT: ICD-10-PCS | Mod: 26,,, | Performed by: RADIOLOGY

## 2019-03-19 PROCEDURE — 72120 X-RAY BEND ONLY L-S SPINE: CPT | Mod: 26,,, | Performed by: RADIOLOGY

## 2019-03-21 ENCOUNTER — OFFICE VISIT (OUTPATIENT)
Dept: SPINE | Facility: CLINIC | Age: 69
End: 2019-03-21
Payer: COMMERCIAL

## 2019-03-21 VITALS
DIASTOLIC BLOOD PRESSURE: 85 MMHG | SYSTOLIC BLOOD PRESSURE: 143 MMHG | HEIGHT: 73 IN | BODY MASS INDEX: 30.48 KG/M2 | HEART RATE: 64 BPM | WEIGHT: 230 LBS

## 2019-03-21 DIAGNOSIS — G89.29 CHRONIC BILATERAL LOW BACK PAIN WITH RIGHT-SIDED SCIATICA: Primary | ICD-10-CM

## 2019-03-21 DIAGNOSIS — M51.36 DDD (DEGENERATIVE DISC DISEASE), LUMBAR: ICD-10-CM

## 2019-03-21 DIAGNOSIS — M47.26 OTHER SPONDYLOSIS WITH RADICULOPATHY, LUMBAR REGION: ICD-10-CM

## 2019-03-21 DIAGNOSIS — M43.16 SPONDYLOLISTHESIS, LUMBAR REGION: ICD-10-CM

## 2019-03-21 DIAGNOSIS — M48.062 SPINAL STENOSIS, LUMBAR REGION WITH NEUROGENIC CLAUDICATION: ICD-10-CM

## 2019-03-21 DIAGNOSIS — M54.16 LUMBAR RADICULOPATHY: ICD-10-CM

## 2019-03-21 DIAGNOSIS — M54.41 CHRONIC BILATERAL LOW BACK PAIN WITH RIGHT-SIDED SCIATICA: Primary | ICD-10-CM

## 2019-03-21 PROCEDURE — 99214 OFFICE O/P EST MOD 30 MIN: CPT | Mod: S$GLB,,, | Performed by: PHYSICIAN ASSISTANT

## 2019-03-21 PROCEDURE — 99999 PR PBB SHADOW E&M-EST. PATIENT-LVL IV: ICD-10-PCS | Mod: PBBFAC,,, | Performed by: PHYSICIAN ASSISTANT

## 2019-03-21 PROCEDURE — 1101F PT FALLS ASSESS-DOCD LE1/YR: CPT | Mod: CPTII,S$GLB,, | Performed by: PHYSICIAN ASSISTANT

## 2019-03-21 PROCEDURE — 1101F PR PT FALLS ASSESS DOC 0-1 FALLS W/OUT INJ PAST YR: ICD-10-PCS | Mod: CPTII,S$GLB,, | Performed by: PHYSICIAN ASSISTANT

## 2019-03-21 PROCEDURE — 99214 PR OFFICE/OUTPT VISIT, EST, LEVL IV, 30-39 MIN: ICD-10-PCS | Mod: S$GLB,,, | Performed by: PHYSICIAN ASSISTANT

## 2019-03-21 PROCEDURE — 99999 PR PBB SHADOW E&M-EST. PATIENT-LVL IV: CPT | Mod: PBBFAC,,, | Performed by: PHYSICIAN ASSISTANT

## 2019-03-21 RX ORDER — OXYCODONE AND ACETAMINOPHEN 10; 325 MG/1; MG/1
1 TABLET ORAL EVERY 6 HOURS PRN
Qty: 60 TABLET | Refills: 0 | Status: SHIPPED | OUTPATIENT
Start: 2019-03-21 | End: 2019-04-09 | Stop reason: SDUPTHER

## 2019-03-22 ENCOUNTER — PATIENT MESSAGE (OUTPATIENT)
Dept: SPINE | Facility: CLINIC | Age: 69
End: 2019-03-22

## 2019-03-22 ENCOUNTER — TELEPHONE (OUTPATIENT)
Dept: PAIN MEDICINE | Facility: CLINIC | Age: 69
End: 2019-03-22

## 2019-03-22 DIAGNOSIS — M54.41 CHRONIC BILATERAL LOW BACK PAIN WITH RIGHT-SIDED SCIATICA: ICD-10-CM

## 2019-03-22 DIAGNOSIS — M51.36 DDD (DEGENERATIVE DISC DISEASE), LUMBAR: Primary | ICD-10-CM

## 2019-03-22 DIAGNOSIS — G89.29 CHRONIC BILATERAL LOW BACK PAIN WITH RIGHT-SIDED SCIATICA: ICD-10-CM

## 2019-03-22 NOTE — PROGRESS NOTES
"Subjective:     Patient ID:  Franko Buchanan is a 69 y.o. male.    Naldo    Chief Complaint: Back pain, right hip pain and right leg pain    HPI    Franko Buchanan is a 69 y.o. male who presents for follow up for his right hip pain and right leg pain      Epidurals:   10/2010 Lumbar Epidural   11/2011 Lumbar Epidural   05/2013 Lumbar Epidural deferred  9/2015 Cervical spine epidural Dr. Guerra at [facility].     Spinal Surgeries:   09/95 Lumbar Laminectomy L3-L4 Dr. Doss.   03/2012: MIS Lami/discectomy L5-S1 Dr. ZEFERINO Hitchcock.   11/2018: ACDF Dr. Hitchcock    10/2017 Dr. Ramires, Orthopedics at [facility] for hip pain. Recommended right hip replacement    12/2018 Hip pain progressively worse.     1/2019  Dr. [facility], Orthopedics, who said that hip pain is from lumbar spine, not the hip.    Pain in the right hip and leg has been about two years and has gotten worse in the last 3-4 months.  Pain primarily in the right hip and some in the left hip.  Pain radiates to the right hip and buttock region with radiation down the right anterior and lateral leg to the top of the right foot.  Occasional back pain.  Constant right hip pain and the pain in the right leg comes and goes.  Pain worse with standing and walking and better with laying down.  No left leg pain.    Patient takes advil and occasional percocet.      Patient denies any recent accidents or trauma, no saddle anesthesias, and no bowel or bladder incontinence.      Review of Systems:  Constitution: Negative for chills, fever, night sweats and weight loss.   Musculoskeletal: Negative for falls.   Gastrointestinal: Negative for bowel incontinence, nausea and vomiting.   Genitourinary: Negative for bladder incontinence.   Neurological: Negative for disturbances in coordination and loss of balance.      Objective:      Vitals:    03/21/19 1402   BP: (!) 143/85   Pulse: 64   Weight: 104.3 kg (230 lb)   Height: 6' 1" (1.854 m)   PainSc:   7   PainLoc: Back         Physical " Exam:    General:  Franko Buchanan is well-developed, well-nourished, appears stated age, in no acute distress, alert and oriented to person, place, and time.    Pulmonary/Chest:  Respiratory effort normal  Abdominal: Exhibits no distension  Psychiatric:  Normal mood and affect.  Behavior is normal.  Judgement and thought content normal    Musculoskeletal:    Patient arises from a sitting to standing position without difficulty.  Patient walks to the door without evidence of limp, pain, or abnormality of gait. Patient is able to walk on heels and toes without difficulty.    Lumbar ROM:   No back pain in lumbar flexion, extension, right lateral bending, and left lateral bending.    Lumbar Spine Inspection:  Normal with no surgical scars with no visible rashes.    Lumbar Spine Palpation:  No tenderness to low back palpation.    SI Joint Palpation:  No tenderness to SI Joint palpation.    Straight Leg Raise:  Negative right and left SLR.    No pain on right hip internal and external rotation, adduction, abduction, or flexion and extension.    Neurological:  Alert and oriented to person, place, and time    Muscle strength against resistance:     Right Left   Hip flexion  5 / 5 5 / 5   Hip extension 5 / 5 5 / 5   Hip abduction 5 / 5 5 / 5   Hip adduction  5 / 5 5 / 5   Knee extension  5 / 5 5 / 5   Knee flexion 5 / 5 5 / 5   Dorsiflexion  5 / 5 5 / 5   EHL  5 / 5 5 / 5   Plantar flexion  5 / 5 5 / 5   Inversion of the feet 5 / 5 5 / 5   Eversion of the feet  5 / 5 5 / 5     Reflexes:     Right Left   Patellar 2+ 2+   Achilles 2+ 2+     Clonus:  Negative bilaterally    On gross examination of the bilateral upper extremities, patient has full painfree ROM with no signs of clubbing, cyanosis, edema, or weakness.     XRAY/MRI Interpretation:     Lumbar spine ap/lateral/flexion/extension xrays were personally reviewed today.  No fractures.  No movement on flexion and extension.  Multilevel DDD and spondylosis.  Grade one  spondylolisthesis L4-5 and L5-S1.      Lumbar spine MRI was personally reviewed today. Multilevel DDD and spondylosis.  Grade one spondylolisthesis L4-5 and L5-S1.  Previous left laminectomy at L3-4 and L5-S1.  Severe central and bilateral NFS at L4-5.  Bilateral NFS at L3-4 and L5-S1.    Assessment:          1. Chronic bilateral low back pain with right-sided sciatica    2. DDD (degenerative disc disease), lumbar    3. Other spondylosis with radiculopathy, lumbar region    4. Lumbar radiculopathy    5. Spinal stenosis, lumbar region with neurogenic claudication    6. Spondylolisthesis, lumbar region            Plan:          Orders Placed This Encounter    Procedure Order to [facility]    oxyCODONE-acetaminophen (PERCOCET)  mg per tablet       Multilevel DDD and spondylosis.  L4-5 and L5-S1 grade one spondylolisthesis with bilateral NFS at L3-4 and L5-S1 and severe central stenosis at L4-5.    -Caudal FAUZIA through the pain clinic  -Percocet PRN  -Will review further with Dr. Hitchcock when he returns in April    Follow-Up:  Follow-up in about 1 month (around 4/21/2019). If there are any questions prior to this, the patient was instructed to contact the office.       Florence Payan [facility], PA-C  Neurosurgery  [facility]  [facility]

## 2019-03-22 NOTE — TELEPHONE ENCOUNTER
Contacted patient to schedule procedure.    Procedure scheduled, date, time, and instructions given and mailed.    Patient verbalized understanding.

## 2019-03-25 ENCOUNTER — CLINICAL SUPPORT (OUTPATIENT)
Dept: REHABILITATION | Facility: HOSPITAL | Age: 69
End: 2019-03-25
Attending: PHYSICIAN ASSISTANT
Payer: COMMERCIAL

## 2019-03-25 DIAGNOSIS — M43.6 STIFFNESS OF CERVICAL SPINE: ICD-10-CM

## 2019-03-25 DIAGNOSIS — M62.81 MUSCLE WEAKNESS: ICD-10-CM

## 2019-03-25 DIAGNOSIS — M25.60 STIFFNESS OF UNSPECIFIED JOINT, NOT ELSEWHERE CLASSIFIED: ICD-10-CM

## 2019-03-25 DIAGNOSIS — R26.2 DIFFICULTY WALKING: ICD-10-CM

## 2019-03-25 PROCEDURE — 97110 THERAPEUTIC EXERCISES: CPT

## 2019-03-25 PROCEDURE — 97140 MANUAL THERAPY 1/> REGIONS: CPT

## 2019-03-25 NOTE — PROGRESS NOTES
Physical Therapy Daily Treatment Note     Name: Franko Buchanan  Clinic Number: [id number]    Therapy Diagnosis:   Encounter Diagnoses   Name Primary?    Muscle weakness     Stiffness of cervical spine     Stiffness of unspecified joint, not elsewhere classified     Difficulty walking      Physician: Florence Payan, *    Visit Date: 3/25/2019    Physician Orders: PT Eval and Treat   Medical Diagnosis from Referral:   M54.5,G89.29 (ICD-10-CM) - Chronic bilateral low back pain, with sciatica presence unspecified   Z98.1 (ICD-10-CM) - S/P cervical spinal fusion   R29.898 (ICD-10-CM) - Left arm weakness   Evaluation Date: 2/26/2019  Authorization Period Expiration: 12-     Plan of Care Expiration: 05-  Visit # / Visits authorized: 3/30     Time In: 0905  Time Out: 1000  Total Billable Time: 53 minutes     Precautions: Standard and A-fib, Sleep Apnea, Cervical Fusion (Anterior)    Subjective     Pt states feeling stiff and sore in low back but no c/o pn.    He was compliant with home exercise program.  Response to previous treatment: no adverse effects   Functional change: no change     Pain: 0/10  Location: bilateral back      Objective     Franko received therapeutic exercises to develop strength, endurance, ROM, flexibility, posture and core stabilization for 45 minutes including:  TA activation in hook lying 30 x 5 sec hold   PPT 3 x 10 3 sec hold   Bridges 3 x 10   LTR on SB 3 min as oziel   Hook lying abd/add isometric 3 x 10 3 sec hold   DKTC w/ SB 3 min w/ 5 sec hold  Hip abduction iso in hook lying with pilates ring 3 x 10 3 sec hold    scap retraction 3 x 10 3 sec hold w/ orange band   Shld abduction 3 x 10    Franko received the following manual therapy techniques: Soft tissue Mobilization were applied to the: L upper trap for 8 minutes, including:  STM L upper trap      Home Exercises Provided and Patient Education Provided     Education provided:   - Pt edu on proper exercise  technique.      Written Home Exercises Provided: Patient instructed to cont prior HEP.  PTA added PPT, LTR, SKTC to HEP.    Exercises were reviewed and Franko was able to demonstrate them prior to the end of the session.  Franko demonstrated good  understanding of the education provided.       Assessment   Pt showed increased scap control and shld strength during therex.  Pt cont to have some UE and core weakness.  Pt oziel tx well w/ no c/o pn.      Franko is progressing well towards his goals.   Pt prognosis is Good.     Pt will continue to benefit from skilled outpatient physical therapy to address the deficits listed in the problem list box on initial evaluation, provide pt/family education and to maximize pt's level of independence in the home and community environment.     Pt's spiritual, cultural and educational needs considered and pt agreeable to plan of care and goals.     Anticipated barriers to physical therapy: none    Goals: Goals:  Short Term Goals: 5 weeks   1. Patient will demonstrate improved lumbar AROM to below levels in order to improve mobility during ambulation and other daily activities: (progressing, not met)   Extension 10      Left Side Bending 10   Right Side Bending 10   Left Rotation    85%   Right Rotation    85%   2. Patient will demonstrate improved LUE strength to below levels in order to improve ability to manipulate objects on a daily basis:(progressing, not met)     Left   Shoulder Flexion 4/5   Shoulder Abduction 4/5   Shoulder ER 4/5   Dynamometer  62.5 lbs.   3. Patient will report being able to stand and walk for 1 hour or longer without increased pain in order to improve community participation.(progressing, not met)   4. Patient will demonstrate improved functional ability by showing 30% or less limitation according to the FOTO.(progressing, not met)      Long Term Goals: 10 weeks   1. Patient will demonstrate improved lumbar AROM to below levels in order to improve  mobility during ambulation and other daily activities:(progressing, not met)   Left Side Bending 15   Right Side Bending 15   Left Rotation    75%   Right Rotation    75%   2. Patient will demonstrate improved LUE strength to below levels in order to improve ability to manipulate objects on a daily basis:(progressing, not met)     Left   Shoulder Flexion 4-/5   Shoulder Abduction 4-/5   Shoulder ER 4-/5   Elbow Flexion 5/5   Elbow Extension 5/5   Dynamometer  57.5 lbs.   3. Patient will report being able to stand and walk for 30 minutes or longer without increased pain in order to improve community participation.(progressing, not met)   4. Patient will demonstrate improved functional ability by showing 20% or less limitation according to the FOTO.(progressing, not met)       Plan     Cont to progress towards goals set by PT.  Work to increase trunk mobility and UE strength.      Luis Manuel Gonzalez, PTA

## 2019-03-29 ENCOUNTER — CLINICAL SUPPORT (OUTPATIENT)
Dept: REHABILITATION | Facility: HOSPITAL | Age: 69
End: 2019-03-29
Attending: PHYSICIAN ASSISTANT
Payer: COMMERCIAL

## 2019-03-29 DIAGNOSIS — R26.2 DIFFICULTY WALKING: ICD-10-CM

## 2019-03-29 DIAGNOSIS — M43.6 STIFFNESS OF CERVICAL SPINE: ICD-10-CM

## 2019-03-29 DIAGNOSIS — M25.60 STIFFNESS OF UNSPECIFIED JOINT, NOT ELSEWHERE CLASSIFIED: ICD-10-CM

## 2019-03-29 DIAGNOSIS — M62.81 MUSCLE WEAKNESS: ICD-10-CM

## 2019-03-29 PROCEDURE — 97140 MANUAL THERAPY 1/> REGIONS: CPT

## 2019-03-29 PROCEDURE — 97110 THERAPEUTIC EXERCISES: CPT

## 2019-03-29 NOTE — PROGRESS NOTES
Physical Therapy Daily Treatment Note     Name: Franko Buchanan  Clinic Number: [id number]    Therapy Diagnosis:   Encounter Diagnoses   Name Primary?    Muscle weakness     Stiffness of cervical spine     Stiffness of unspecified joint, not elsewhere classified     Difficulty walking      Physician: Florence Payan, *    Visit Date: 3/29/2019    Physician Orders: PT Eval and Treat   Medical Diagnosis from Referral:   M54.5,G89.29 (ICD-10-CM) - Chronic bilateral low back pain, with sciatica presence unspecified   Z98.1 (ICD-10-CM) - S/P cervical spinal fusion   R29.898 (ICD-10-CM) - Left arm weakness   Evaluation Date: 2/26/2019  Authorization Period Expiration: 12-     Plan of Care Expiration: 05-  Visit # / Visits authorized: 4/30     Time In: 9:00 AM  Time Out: 10:00 AM  Total Billable Time: 46 minutes     Precautions: Standard and A-fib, Sleep Apnea, Cervical Fusion (Anterior)    Subjective     Pt states that he's been getting really fatigued in his legs during the day and still has significant difficulty with manipulation in his LUE. He states it feels different then before when he had his lumbar surgery and now his legs feel diffusely weak.    He was compliant with home exercise program.    Response to previous treatment: No adverse impact  Functional change: No significant change    Objective     Franko received the following manual therapy techniques for 16 minutes, incdluing:   - Hook-Lying Lumbar Distraction w/ Mob Belt  - B Long-Axis Hip Distraciton  - B Lumbar Roll Stretch  - L Upper Trap Stretch  - Sub-Occipital Flexion Stretch  - Sub-Occipital Flexion Stretch with Side-Bending R    Franko received therapeutic exercises to develop strength, endurance, ROM, flexibility, posture and core stabilization for 30 minutes including:  - Hook-Lying Chin Tucks - 20x10 sec.  - Hook-Lying LTR's w/ Hip ADD Ball Squeeze - 3 min.  - B Supine Active Quad & Contralateral HS Stretch @ EOB - 10x10  sec. Each  - Hook-Lying Bridges w/ Clamshell Iso. - 3x8 [place] TB  - Hook-Lying Phsyioball Abdominal Presses - 2 min.  - B Prone Aquaman (Multifidus Exercise) - 2 min.  - Split Stance Shoulder Rows - 2 min. [place]oon TB  - B Standing Eccentric Scaption - 4x1 min. 1 lbs. DB's  BELOW NOT PERFORMED TODAY  DKTC w/ SB 3 min w/ 5 sec hold  Hip abduction iso in hook lying with pilates ring 3 x 10 3 sec hold      Home Exercises Provided and Patient Education Provided     Education provided:   - Patient educated on proper exercise technique.      Written Home Exercises Provided: Patient instructed to cont prior HEP.  PTA added PPT, LTR, SKTC to HEP.    Exercises were reviewed and Franko was able to demonstrate them prior to the end of the session.  Franko demonstrated good  understanding of the education provided.     Assessment     - The patient has significant sub-occipital muscle tightness present with sub-occipital flexion stretching as well as significant tightness of the L Upper Trapezius and Levator Scapula muscles. The patient also has significant joint hypomobility and soft-tissue tightness present in the lumbar spine during lumbar roll stretching. The patient also demonstrates significant hamstring and quadriceps tightness, the quadriceps tightness contributing to difficulty with hip extension mobility during bridges.    Franko is progressing well towards his goals.     Pt prognosis is Good.     Pt will continue to benefit from skilled outpatient physical therapy to address the deficits listed in the problem list box on initial evaluation, provide pt/family education and to maximize pt's level of independence in the home and community environment.     Pt's spiritual, cultural and educational needs considered and pt agreeable to plan of care and goals.     Anticipated barriers to physical therapy: none    Goals: Goals:  Short Term Goals: 5 weeks   1. Patient will demonstrate improved lumbar AROM to below levels in  order to improve mobility during ambulation and other daily activities: (progressing, not met)   Extension 10      Left Side Bending 10   Right Side Bending 10   Left Rotation    85%   Right Rotation    85%   2. Patient will demonstrate improved LUE strength to below levels in order to improve ability to manipulate objects on a daily basis:(progressing, not met)     Left   Shoulder Flexion 4/5   Shoulder Abduction 4/5   Shoulder ER 4/5   Dynamometer  62.5 lbs.   3. Patient will report being able to stand and walk for 1 hour or longer without increased pain in order to improve community participation.(progressing, not met)   4. Patient will demonstrate improved functional ability by showing 30% or less limitation according to the FOTO.(progressing, not met)      Long Term Goals: 10 weeks   1. Patient will demonstrate improved lumbar AROM to below levels in order to improve mobility during ambulation and other daily activities:(progressing, not met)   Left Side Bending 15   Right Side Bending 15   Left Rotation    75%   Right Rotation    75%   2. Patient will demonstrate improved LUE strength to below levels in order to improve ability to manipulate objects on a daily basis:(progressing, not met)     Left   Shoulder Flexion 4-/5   Shoulder Abduction 4-/5   Shoulder ER 4-/5   Elbow Flexion 5/5   Elbow Extension 5/5   Dynamometer  57.5 lbs.   3. Patient will report being able to stand and walk for 30 minutes or longer without increased pain in order to improve community participation.(progressing, not met)   4. Patient will demonstrate improved functional ability by showing 20% or less limitation according to the FOTO.(progressing, not met)       Plan     Cont to progress towards goals set by PT.  Work to increase trunk mobility and UE strength.      Negrito Monsivais, PT, DPT

## 2019-04-01 ENCOUNTER — CLINICAL SUPPORT (OUTPATIENT)
Dept: REHABILITATION | Facility: HOSPITAL | Age: 69
End: 2019-04-01
Attending: PHYSICIAN ASSISTANT
Payer: COMMERCIAL

## 2019-04-01 DIAGNOSIS — M62.81 MUSCLE WEAKNESS: ICD-10-CM

## 2019-04-01 DIAGNOSIS — R26.2 DIFFICULTY WALKING: ICD-10-CM

## 2019-04-01 DIAGNOSIS — M43.6 STIFFNESS OF CERVICAL SPINE: ICD-10-CM

## 2019-04-01 DIAGNOSIS — M25.60 STIFFNESS OF UNSPECIFIED JOINT, NOT ELSEWHERE CLASSIFIED: ICD-10-CM

## 2019-04-01 PROCEDURE — 97140 MANUAL THERAPY 1/> REGIONS: CPT

## 2019-04-01 PROCEDURE — 97110 THERAPEUTIC EXERCISES: CPT

## 2019-04-01 NOTE — PROGRESS NOTES
Physical Therapy Daily Treatment Note     Name: Franko Buchanan  Clinic Number: [id number]    Therapy Diagnosis:   Encounter Diagnoses   Name Primary?    Muscle weakness     Stiffness of cervical spine     Stiffness of unspecified joint, not elsewhere classified     Difficulty walking      Physician: Florence Payan, *    Visit Date: 4/1/2019    Physician Orders: PT Eval and Treat   Medical Diagnosis from Referral:   M54.5,G89.29 (ICD-10-CM) - Chronic bilateral low back pain, with sciatica presence unspecified   Z98.1 (ICD-10-CM) - S/P cervical spinal fusion   R29.898 (ICD-10-CM) - Left arm weakness   Evaluation Date: 2/26/2019  Authorization Period Expiration: 12-     Plan of Care Expiration: 05-  Visit # / Visits authorized: 5/30     Time In: 0903  Time Out: 1000  Total Billable Time: 53 minutes     Precautions: Standard and A-fib, Sleep Apnea, Cervical Fusion (Anterior)    Subjective   Pt reports w/ numbness and stiffness in hands as well as soreness in low back currently.  Pt mentioned that neck stiffness and pn has decreased since beginning therapy.      He was compliant with home exercise program.    Response to previous treatment: No adverse impact  Functional change: No significant change    Objective   FOTO: 4/1/19  Upper arm 50% limitation   Lumbar Spine 64% limitation     Franko received the following manual therapy techniques for 8 minutes, incdluing:   - B Long-Axis Hip Distraciton  - Sub- occipital release     Not performed today:   - Hook-Lying Lumbar Distraction w/ Mob Belt Np  - B Lumbar Roll Stretch NP  - Sub-Occipital Flexion Stretch Np  - Sub-Occipital Flexion Stretch with Side-Bending R NP    Farnko received therapeutic exercises to develop strength, endurance, ROM, flexibility, posture and core stabilization for 45 minutes including:    - Hook-Lying Chin Tucks - 20x10 sec.  - L Upper Trap Stretch 4 x 30 sec   - Hook-Lying LTR's w/ Hip ADD Ball Squeeze - 3 min.  -  Hook-Lying Bridges w/ Clamshell Iso. - 3x10 3 sec hold Orange TB  - Hook-Lying Phsyioball Abdominal Presses - 2 min.  - Split Stance Shoulder Rows - 2 min. Maroon TB  - B Standing Eccentric Scaption - 4x1 min. 1 lbs. DB's    BELOW NOT PERFORMED TODAY  DKTC w/ SB 3 min w/ 5 sec hold  Hip abduction iso in hook lying with pilates ring 3 x 10 3 sec hold    - B Supine Active Quad & Contralateral HS Stretch @ EOB - 10x10 sec. Each  - B Prone Aquaman (Multifidus Exercise) - 2 min. np    Home Exercises Provided and Patient Education Provided     Education provided:   - Patient educated on proper exercise technique.      Written Home Exercises Provided: Patient instructed to cont prior HEP.  PTA added PPT, LTR, SKTC to HEP.    Exercises were reviewed and Franko was able to demonstrate them prior to the end of the session.  Franko demonstrated good  understanding of the education provided.     Assessment   Pt oziel tx well.  Pn and stiffness decreased after tx session.  Pt showed increased hip endurance during therex.  Pt cont to lack core stability, correct posture, and scap stability.        Franko is progressing well towards his goals.     Pt prognosis is Good.     Pt will continue to benefit from skilled outpatient physical therapy to address the deficits listed in the problem list box on initial evaluation, provide pt/family education and to maximize pt's level of independence in the home and community environment.     Pt's spiritual, cultural and educational needs considered and pt agreeable to plan of care and goals.     Anticipated barriers to physical therapy: none    Goals: Goals:  Short Term Goals: 5 weeks   1. Patient will demonstrate improved lumbar AROM to below levels in order to improve mobility during ambulation and other daily activities: (progressing, not met)   Extension 10      Left Side Bending 10   Right Side Bending 10   Left Rotation    85%   Right Rotation    85%   2. Patient will demonstrate  improved LUE strength to below levels in order to improve ability to manipulate objects on a daily basis:(progressing, not met)     Left   Shoulder Flexion 4/5   Shoulder Abduction 4/5   Shoulder ER 4/5   Dynamometer  62.5 lbs.   3. Patient will report being able to stand and walk for 1 hour or longer without increased pain in order to improve community participation.(progressing, not met)   4. Patient will demonstrate improved functional ability by showing 30% or less limitation according to the FOTO.(progressing, not met)      Long Term Goals: 10 weeks   1. Patient will demonstrate improved lumbar AROM to below levels in order to improve mobility during ambulation and other daily activities:(progressing, not met)   Left Side Bending 15   Right Side Bending 15   Left Rotation    75%   Right Rotation    75%   2. Patient will demonstrate improved LUE strength to below levels in order to improve ability to manipulate objects on a daily basis:(progressing, not met)     Left   Shoulder Flexion 4-/5   Shoulder Abduction 4-/5   Shoulder ER 4-/5   Elbow Flexion 5/5   Elbow Extension 5/5   Dynamometer  57.5 lbs.   3. Patient will report being able to stand and walk for 30 minutes or longer without increased pain in order to improve community participation.(progressing, not met)   4. Patient will demonstrate improved functional ability by showing 20% or less limitation according to the FOTO.(progressing, not met)       Plan     Cont to progress towards goals set by PT.  Work to increase trunk mobility and UE strength.      Luis Manuel Gonzalez, PTA

## 2019-04-05 ENCOUNTER — PATIENT MESSAGE (OUTPATIENT)
Dept: FAMILY MEDICINE | Facility: CLINIC | Age: 69
End: 2019-04-05

## 2019-04-05 ENCOUNTER — CLINICAL SUPPORT (OUTPATIENT)
Dept: REHABILITATION | Facility: HOSPITAL | Age: 69
End: 2019-04-05
Attending: PHYSICIAN ASSISTANT
Payer: COMMERCIAL

## 2019-04-05 DIAGNOSIS — R26.2 DIFFICULTY WALKING: ICD-10-CM

## 2019-04-05 DIAGNOSIS — M62.81 MUSCLE WEAKNESS: ICD-10-CM

## 2019-04-05 DIAGNOSIS — R53.83 FATIGUE, UNSPECIFIED TYPE: Primary | ICD-10-CM

## 2019-04-05 DIAGNOSIS — M43.6 STIFFNESS OF CERVICAL SPINE: ICD-10-CM

## 2019-04-05 DIAGNOSIS — M06.9 RHEUMATOID ARTHRITIS, INVOLVING UNSPECIFIED SITE, UNSPECIFIED RHEUMATOID FACTOR PRESENCE: ICD-10-CM

## 2019-04-05 DIAGNOSIS — D64.9 ANEMIA, UNSPECIFIED TYPE: ICD-10-CM

## 2019-04-05 DIAGNOSIS — M25.60 STIFFNESS OF UNSPECIFIED JOINT, NOT ELSEWHERE CLASSIFIED: ICD-10-CM

## 2019-04-05 PROCEDURE — 97110 THERAPEUTIC EXERCISES: CPT

## 2019-04-05 PROCEDURE — 97140 MANUAL THERAPY 1/> REGIONS: CPT

## 2019-04-05 NOTE — TELEPHONE ENCOUNTER
"  No problem i'll order the following labs and you can set them up at your convenience  *My nurse Azalia said that we may not need to schedule a specific appt and the lab could see the orders and "schedule" them to be done then whenever you go in.. Don't worry about fasting but if you are that's fine.    Loyd        Orders Placed This Encounter      CBC auto differential      Comprehensive metabolic panel      Vitamin D      Vitamin B12      Ferritin      Sedimentation rate      C-reactive protein      TSH      Iron and TIBC      ===View-only below this line===      ----- Message -----     From: Franko Buchanan     Sent: 4/5/2019  7:54 AM CDT       To: Loyd Garcia MD  Subject: [facility]-Urgent [facility]    Loyd-  I have been experiencing worsening fatigue. Nodding off at my desk, unable to complete reviews of labs, etc. Things that I had no trouble with even a couple of months ago. I am exhausted by the end of a routine office day.  I have ongoing back problems and I'm not bouncing back from the cervical fusion as well as I hoped I would. I have been in PT at [facility] for help with this and have a lumbar injection scheduled next week.  I went to [place] and did some A-CPAP adjustments. Also, I have adjusted my office schedule to lessen the work load.  Would you please place orders for lab work for me? I think we had better check general parameters but also would like labs related to RA history done. The way I have been feeling sometimes makes me wonder if there isn't a relapse trying to start up. If you think other things need checking regarding weakness and fatigue, please add to lab order.  I want to clear the elements that can be assessed with bloodwork, then if anything lights up, we can get started on that. I can set up an office appointment with you whenever you recommend.  Please let me know so I can get over to the lab possibly today or tomorrow.  Thank you very much,  Franko Buchanan. MD  ([id number])    "

## 2019-04-05 NOTE — PROGRESS NOTES
Physical Therapy Daily Treatment Note     Name: Franko Buchanan  Clinic Number: [id number]    Therapy Diagnosis:   Encounter Diagnoses   Name Primary?    Muscle weakness     Stiffness of cervical spine     Stiffness of unspecified joint, not elsewhere classified     Difficulty walking      Physician: Florence Payan, *    Visit Date: 4/5/2019    Physician Orders: PT Eval and Treat   Medical Diagnosis from Referral:   M54.5,G89.29 (ICD-10-CM) - Chronic bilateral low back pain, with sciatica presence unspecified   Z98.1 (ICD-10-CM) - S/P cervical spinal fusion   R29.898 (ICD-10-CM) - Left arm weakness   Evaluation Date: 2/26/2019  Authorization Period Expiration: 12-     Plan of Care Expiration: 05-  Visit # / Visits authorized:65/30     Time In: 0905  Time Out: 1000  Total Billable Time: 55 minutes     Precautions: Standard and A-fib, Sleep Apnea, Cervical Fusion (Anterior)    Subjective   Pt states having 2/10 pn in neck and 4/10 pn in low back.  Pt memntioned having less low back pn at the end of the day on non working days.   He was compliant with home exercise program.    Response to previous treatment: No adverse impact  Functional change: No significant change    Objective       Franko received the following manual therapy techniques for 10 minutes, incdluing:   - B Long-Axis Hip Distraciton  - Sub- occipital release     Not performed today:   - Hook-Lying Lumbar Distraction w/ Mob Belt Np  - B Lumbar Roll Stretch NP  - Sub-Occipital Flexion Stretch Np  - Sub-Occipital Flexion Stretch with Side-Bending R NP    Franko received therapeutic exercises to develop strength, endurance, ROM, flexibility, posture and core stabilization for 45 minutes including:    - Hook-Lying Chin Tucks - 20x10 sec.  - L Upper Trap Stretch 4 x 30 sec   - Hook-Lying LTR's w/ Hip ADD Ball Squeeze - 3 min.  - Hook-Lying Bridges w/ Clamshell Iso. - 3x10 3 sec hold Orange TB  - Hook-Lying Phsyioball Abdominal  Presses - 2 min.  - Split Stance Shoulder Rows - 2 min. Luis Alberto TB  - Shld ex orange badn 30x   - B Standing Eccentric Scaption - 4x1 min. 1 lbs. DB's    BELOW NOT PERFORMED TODAY  DKTC w/ SB 3 min w/ 5 sec hold  Hip abduction iso in hook lying with pilates ring 3 x 10 3 sec hold    - B Supine Active Quad & Contralateral HS Stretch @ EOB - 10x10 sec. Each  - B Prone Aquaman (Multifidus Exercise) - 2 min. np    Home Exercises Provided and Patient Education Provided     Education provided:   - Patient educated on proper exercise technique.      Written Home Exercises Provided: Patient instructed to cont prior HEP.  PTA added PPT, LTR, SKTC to HEP.    Exercises were reviewed and Franko was able to demonstrate them prior to the end of the session.  Franko demonstrated good  understanding of the education provided.     Assessment     Pt displayed increased strength during therex.  Pt cont to lack some endurance and ROM.  Pt oziel tx well.  pn level decreased some after tx session.      Franko is progressing well towards his goals.     Pt prognosis is Good.     Pt will continue to benefit from skilled outpatient physical therapy to address the deficits listed in the problem list box on initial evaluation, provide pt/family education and to maximize pt's level of independence in the home and community environment.     Pt's spiritual, cultural and educational needs considered and pt agreeable to plan of care and goals.     Anticipated barriers to physical therapy: none    Goals: Goals:  Short Term Goals: 5 weeks   1. Patient will demonstrate improved lumbar AROM to below levels in order to improve mobility during ambulation and other daily activities: (progressing, not met)   Extension 10      Left Side Bending 10   Right Side Bending 10   Left Rotation    85%   Right Rotation    85%   2. Patient will demonstrate improved LUE strength to below levels in order to improve ability to manipulate objects on a daily  basis:(progressing, not met)     Left   Shoulder Flexion 4/5   Shoulder Abduction 4/5   Shoulder ER 4/5   Dynamometer  62.5 lbs.   3. Patient will report being able to stand and walk for 1 hour or longer without increased pain in order to improve community participation.(progressing, not met)   4. Patient will demonstrate improved functional ability by showing 30% or less limitation according to the FOTO.(progressing, not met)      Long Term Goals: 10 weeks   1. Patient will demonstrate improved lumbar AROM to below levels in order to improve mobility during ambulation and other daily activities:(progressing, not met)   Left Side Bending 15   Right Side Bending 15   Left Rotation    75%   Right Rotation    75%   2. Patient will demonstrate improved LUE strength to below levels in order to improve ability to manipulate objects on a daily basis:(progressing, not met)     Left   Shoulder Flexion 4-/5   Shoulder Abduction 4-/5   Shoulder ER 4-/5   Elbow Flexion 5/5   Elbow Extension 5/5   Dynamometer  57.5 lbs.   3. Patient will report being able to stand and walk for 30 minutes or longer without increased pain in order to improve community participation.(progressing, not met)   4. Patient will demonstrate improved functional ability by showing 20% or less limitation according to the FOTO.(progressing, not met)       Plan     Cont to progress towards goals set by PT.  Work to increase trunk mobility and UE strength.      Luis Manuel Gonzalez, PTA

## 2019-04-06 ENCOUNTER — LAB VISIT (OUTPATIENT)
Dept: LAB | Facility: HOSPITAL | Age: 69
End: 2019-04-06
Attending: FAMILY MEDICINE
Payer: COMMERCIAL

## 2019-04-06 DIAGNOSIS — M06.9 RHEUMATOID ARTHRITIS, INVOLVING UNSPECIFIED SITE, UNSPECIFIED RHEUMATOID FACTOR PRESENCE: ICD-10-CM

## 2019-04-06 DIAGNOSIS — R53.83 FATIGUE, UNSPECIFIED TYPE: ICD-10-CM

## 2019-04-06 DIAGNOSIS — D64.9 ANEMIA, UNSPECIFIED TYPE: ICD-10-CM

## 2019-04-06 LAB
25(OH)D3+25(OH)D2 SERPL-MCNC: 27 NG/ML (ref 30–96)
ALBUMIN SERPL BCP-MCNC: 3.8 G/DL (ref 3.5–5.2)
ALP SERPL-CCNC: 98 U/L (ref 55–135)
ALT SERPL W/O P-5'-P-CCNC: 21 U/L (ref 10–44)
ANION GAP SERPL CALC-SCNC: 8 MMOL/L (ref 8–16)
AST SERPL-CCNC: 28 U/L (ref 10–40)
BASOPHILS # BLD AUTO: 0.01 K/UL (ref 0–0.2)
BASOPHILS NFR BLD: 0.2 % (ref 0–1.9)
BILIRUB SERPL-MCNC: 0.9 MG/DL (ref 0.1–1)
BUN SERPL-MCNC: 16 MG/DL (ref 8–23)
CALCIUM SERPL-MCNC: 9.5 MG/DL (ref 8.7–10.5)
CHLORIDE SERPL-SCNC: 104 MMOL/L (ref 95–110)
CO2 SERPL-SCNC: 25 MMOL/L (ref 23–29)
CREAT SERPL-MCNC: 0.9 MG/DL (ref 0.5–1.4)
CRP SERPL-MCNC: 1.8 MG/L (ref 0–8.2)
DIFFERENTIAL METHOD: ABNORMAL
EOSINOPHIL # BLD AUTO: 0.2 K/UL (ref 0–0.5)
EOSINOPHIL NFR BLD: 3.2 % (ref 0–8)
ERYTHROCYTE [DISTWIDTH] IN BLOOD BY AUTOMATED COUNT: 14 % (ref 11.5–14.5)
ERYTHROCYTE [SEDIMENTATION RATE] IN BLOOD BY WESTERGREN METHOD: 11 MM/HR (ref 0–10)
EST. GFR  (AFRICAN AMERICAN): >60 ML/MIN/1.73 M^2
EST. GFR  (NON AFRICAN AMERICAN): >60 ML/MIN/1.73 M^2
FERRITIN SERPL-MCNC: 74 NG/ML (ref 20–300)
GLUCOSE SERPL-MCNC: 122 MG/DL (ref 70–110)
HCT VFR BLD AUTO: 39.8 % (ref 40–54)
HGB BLD-MCNC: 13.5 G/DL (ref 14–18)
IRON SERPL-MCNC: 104 UG/DL (ref 45–160)
LYMPHOCYTES # BLD AUTO: 2.5 K/UL (ref 1–4.8)
LYMPHOCYTES NFR BLD: 38.3 % (ref 18–48)
MCH RBC QN AUTO: 29.9 PG (ref 27–31)
MCHC RBC AUTO-ENTMCNC: 33.9 G/DL (ref 32–36)
MCV RBC AUTO: 88 FL (ref 82–98)
MONOCYTES # BLD AUTO: 0.6 K/UL (ref 0.3–1)
MONOCYTES NFR BLD: 8.4 % (ref 4–15)
NEUTROPHILS # BLD AUTO: 3.3 K/UL (ref 1.8–7.7)
NEUTROPHILS NFR BLD: 49.6 % (ref 38–73)
PLATELET # BLD AUTO: 202 K/UL (ref 150–350)
PMV BLD AUTO: 10.2 FL (ref 9.2–12.9)
POTASSIUM SERPL-SCNC: 4.1 MMOL/L (ref 3.5–5.1)
PROT SERPL-MCNC: 7.1 G/DL (ref 6–8.4)
RBC # BLD AUTO: 4.52 M/UL (ref 4.6–6.2)
SATURATED IRON: 30 % (ref 20–50)
SODIUM SERPL-SCNC: 137 MMOL/L (ref 136–145)
TOTAL IRON BINDING CAPACITY: 345 UG/DL (ref 250–450)
TRANSFERRIN SERPL-MCNC: 233 MG/DL (ref 200–375)
TSH SERPL DL<=0.005 MIU/L-ACNC: 3.2 UIU/ML (ref 0.4–4)
VIT B12 SERPL-MCNC: 231 PG/ML (ref 210–950)
WBC # BLD AUTO: 6.64 K/UL (ref 3.9–12.7)

## 2019-04-06 PROCEDURE — 82306 VITAMIN D 25 HYDROXY: CPT

## 2019-04-06 PROCEDURE — 85025 COMPLETE CBC W/AUTO DIFF WBC: CPT

## 2019-04-06 PROCEDURE — 83540 ASSAY OF IRON: CPT

## 2019-04-06 PROCEDURE — 85652 RBC SED RATE AUTOMATED: CPT

## 2019-04-06 PROCEDURE — 84443 ASSAY THYROID STIM HORMONE: CPT

## 2019-04-06 PROCEDURE — 80053 COMPREHEN METABOLIC PANEL: CPT

## 2019-04-06 PROCEDURE — 86140 C-REACTIVE PROTEIN: CPT

## 2019-04-06 PROCEDURE — 36415 COLL VENOUS BLD VENIPUNCTURE: CPT

## 2019-04-06 PROCEDURE — 82728 ASSAY OF FERRITIN: CPT

## 2019-04-06 PROCEDURE — 82607 VITAMIN B-12: CPT

## 2019-04-07 ENCOUNTER — PATIENT MESSAGE (OUTPATIENT)
Dept: FAMILY MEDICINE | Facility: CLINIC | Age: 69
End: 2019-04-07

## 2019-04-07 NOTE — TELEPHONE ENCOUNTER
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 If you are currently doing this, you may double your dose to see if this is helpful.    Also B12 level was low end of normal. Some people with b12 level 200-400 may have true deficiency despite "normal" lab. We could do a more indepth blood test called Methylmalonic acid level to distinguish true B12 deficiency, although it may be easier to take over the counter b12 supplement 1000 mcg daily. It is excretable so it's not a big deal if your levels go high with this, but perhaps if after 3-6 months if levels are high it may be ok to stop the supplement.    Rheumatoid testing for sed rate and CRP were actually down (esr was minimally hi but lower than last check and CRP was normal, suggesting against a significant RA flare.     Finally the glucose was mildly high but only matters if you were fasting. If nonfasting glucose it is in normal range.     Please let me know if you have any questions.    Loyd             "

## 2019-04-08 ENCOUNTER — CLINICAL SUPPORT (OUTPATIENT)
Dept: REHABILITATION | Facility: HOSPITAL | Age: 69
End: 2019-04-08
Attending: PHYSICIAN ASSISTANT
Payer: COMMERCIAL

## 2019-04-08 DIAGNOSIS — M43.6 STIFFNESS OF CERVICAL SPINE: ICD-10-CM

## 2019-04-08 DIAGNOSIS — R26.2 DIFFICULTY WALKING: ICD-10-CM

## 2019-04-08 DIAGNOSIS — M25.60 STIFFNESS OF UNSPECIFIED JOINT, NOT ELSEWHERE CLASSIFIED: ICD-10-CM

## 2019-04-08 DIAGNOSIS — M62.81 MUSCLE WEAKNESS: ICD-10-CM

## 2019-04-08 PROCEDURE — 97140 MANUAL THERAPY 1/> REGIONS: CPT

## 2019-04-08 PROCEDURE — 97110 THERAPEUTIC EXERCISES: CPT

## 2019-04-08 NOTE — PROGRESS NOTES
"  Physical Therapy Daily Treatment Note     Name: Franko Buchanan  Clinic Number: [id number]    Therapy Diagnosis:   Encounter Diagnoses   Name Primary?    Muscle weakness     Stiffness of cervical spine     Stiffness of unspecified joint, not elsewhere classified     Difficulty walking      Physician: Florence Payan, *    Visit Date: 4/8/2019    Physician Orders: PT Eval and Treat   Medical Diagnosis from Referral:   M54.5,G89.29 (ICD-10-CM) - Chronic bilateral low back pain, with sciatica presence unspecified   Z98.1 (ICD-10-CM) - S/P cervical spinal fusion   R29.898 (ICD-10-CM) - Left arm weakness   Evaluation Date: 2/26/2019  Authorization Period Expiration: 12-     Plan of Care Expiration: 05-  Visit # / Visits authorized:7/30     Time In: 0905  Time Out: 1002   Total Billable Time: 55 minutes     Precautions: Standard and A-fib, Sleep Apnea, Cervical Fusion (Anterior)    Subjective   Pt reports feeling stiff and sore in neck and low back.    He was compliant with home exercise program.    Response to previous treatment: No adverse effects   Functional change: No significant change    Objective       Franko received the following manual therapy techniques for 10 minutes, incdluing:   - B Long-Axis Hip Distraciton  - Sub- occipital release     Not performed today:   - Hook-Lying Lumbar Distraction w/ Mob Belt Np  - B Lumbar Roll Stretch NP  - Sub-Occipital Flexion Stretch Np  - Sub-Occipital Flexion Stretch with Side-Bending R NP    Franko received therapeutic exercises to develop strength, endurance, ROM, flexibility, posture and core stabilization for 45 minutes including:    - Hook-Lying Chin Tucks - 30x10 sec.  - L Upper Trap Stretch 4 x 30 sec   - Hook-Lying LTR's w/ Hip ADD Ball Squeeze - 3 min.  - Hook-Lying Bridges w/ Clamshell Iso. - 3x10 3 sec hold Orange TB  - Hook-Lying Phsyioball Abdominal Presses - 2 min.  - Step ups 3" 3 x 10 B   - Split Stance Shoulder Rows - 2 min. Maroon " TB  - Shld ex green band 30x   - B Standing Eccentric Scaption - 4x1 min. 1 lbs. DB's    BELOW NOT PERFORMED TODAY  DKTC w/ SB 3 min w/ 5 sec hold  Hip abduction iso in hook lying with pilates ring 3 x 10 3 sec hold    - B Supine Active Quad & Contralateral HS Stretch @ EOB - 10x10 sec. Each  - B Prone Aquaman (Multifidus Exercise) - 2 min. np    Home Exercises Provided and Patient Education Provided     Education provided:   - Patient educated on proper exercise technique.      Written Home Exercises Provided: Patient instructed to cont prior HEP.  PTA added PPT, LTR, SKTC to HEP.    Exercises were reviewed and Franko was able to demonstrate them prior to the end of the session.  Franko demonstrated good  understanding of the education provided.     Assessment   Pt had no adverse effects from tx.  Pt cont to have some UE/LE weakness.  Pt demonstrated improved UR muscular endurance during therex.        Franko is progressing well towards his goals.     Pt prognosis is Good.     Pt will continue to benefit from skilled outpatient physical therapy to address the deficits listed in the problem list box on initial evaluation, provide pt/family education and to maximize pt's level of independence in the home and community environment.     Pt's spiritual, cultural and educational needs considered and pt agreeable to plan of care and goals.     Anticipated barriers to physical therapy: none    Goals: Goals:  Short Term Goals: 5 weeks   1. Patient will demonstrate improved lumbar AROM to below levels in order to improve mobility during ambulation and other daily activities: (progressing, not met)   Extension 10      Left Side Bending 10   Right Side Bending 10   Left Rotation    85%   Right Rotation    85%   2. Patient will demonstrate improved LUE strength to below levels in order to improve ability to manipulate objects on a daily basis:(progressing, not met)     Left   Shoulder Flexion 4/5   Shoulder Abduction 4/5    Shoulder ER 4/5   Dynamometer  62.5 lbs.   3. Patient will report being able to stand and walk for 1 hour or longer without increased pain in order to improve community participation.(progressing, not met)   4. Patient will demonstrate improved functional ability by showing 30% or less limitation according to the FOTO.(progressing, not met)      Long Term Goals: 10 weeks   1. Patient will demonstrate improved lumbar AROM to below levels in order to improve mobility during ambulation and other daily activities:(progressing, not met)   Left Side Bending 15   Right Side Bending 15   Left Rotation    75%   Right Rotation    75%   2. Patient will demonstrate improved LUE strength to below levels in order to improve ability to manipulate objects on a daily basis:(progressing, not met)     Left   Shoulder Flexion 4-/5   Shoulder Abduction 4-/5   Shoulder ER 4-/5   Elbow Flexion 5/5   Elbow Extension 5/5   Dynamometer  57.5 lbs.   3. Patient will report being able to stand and walk for 30 minutes or longer without increased pain in order to improve community participation.(progressing, not met)   4. Patient will demonstrate improved functional ability by showing 20% or less limitation according to the FOTO.(progressing, not met)       Plan     Cont to progress towards goals set by PT.  Cont to improve trunk mobility and UE strength.      Luis Manuel Gonzalez, PTA

## 2019-04-09 ENCOUNTER — OFFICE VISIT (OUTPATIENT)
Dept: SPINE | Facility: CLINIC | Age: 69
End: 2019-04-09
Payer: COMMERCIAL

## 2019-04-09 VITALS
HEART RATE: 61 BPM | WEIGHT: 230 LBS | DIASTOLIC BLOOD PRESSURE: 89 MMHG | HEIGHT: 73 IN | TEMPERATURE: 98 F | BODY MASS INDEX: 30.48 KG/M2 | SYSTOLIC BLOOD PRESSURE: 151 MMHG

## 2019-04-09 DIAGNOSIS — G89.29 CHRONIC BILATERAL LOW BACK PAIN WITH BILATERAL SCIATICA: Primary | ICD-10-CM

## 2019-04-09 DIAGNOSIS — M54.41 CHRONIC BILATERAL LOW BACK PAIN WITH BILATERAL SCIATICA: Primary | ICD-10-CM

## 2019-04-09 DIAGNOSIS — M43.16 SPONDYLOLISTHESIS, LUMBAR REGION: ICD-10-CM

## 2019-04-09 DIAGNOSIS — M51.36 DDD (DEGENERATIVE DISC DISEASE), LUMBAR: ICD-10-CM

## 2019-04-09 DIAGNOSIS — M54.16 LUMBAR RADICULOPATHY: ICD-10-CM

## 2019-04-09 DIAGNOSIS — M47.26 OTHER SPONDYLOSIS WITH RADICULOPATHY, LUMBAR REGION: ICD-10-CM

## 2019-04-09 DIAGNOSIS — M54.42 CHRONIC BILATERAL LOW BACK PAIN WITH BILATERAL SCIATICA: Primary | ICD-10-CM

## 2019-04-09 DIAGNOSIS — M48.062 SPINAL STENOSIS, LUMBAR REGION WITH NEUROGENIC CLAUDICATION: ICD-10-CM

## 2019-04-09 PROCEDURE — 1100F PR PT FALLS ASSESS DOC 2+ FALLS/FALL W/INJURY/YR: ICD-10-PCS | Mod: CPTII,S$GLB,, | Performed by: PHYSICIAN ASSISTANT

## 2019-04-09 PROCEDURE — 99214 OFFICE O/P EST MOD 30 MIN: CPT | Mod: S$GLB,,, | Performed by: PHYSICIAN ASSISTANT

## 2019-04-09 PROCEDURE — 3288F FALL RISK ASSESSMENT DOCD: CPT | Mod: CPTII,S$GLB,, | Performed by: PHYSICIAN ASSISTANT

## 2019-04-09 PROCEDURE — 99999 PR PBB SHADOW E&M-EST. PATIENT-LVL IV: CPT | Mod: PBBFAC,,, | Performed by: PHYSICIAN ASSISTANT

## 2019-04-09 PROCEDURE — 3288F PR FALLS RISK ASSESSMENT DOCUMENTED: ICD-10-PCS | Mod: CPTII,S$GLB,, | Performed by: PHYSICIAN ASSISTANT

## 2019-04-09 PROCEDURE — 99214 PR OFFICE/OUTPT VISIT, EST, LEVL IV, 30-39 MIN: ICD-10-PCS | Mod: S$GLB,,, | Performed by: PHYSICIAN ASSISTANT

## 2019-04-09 PROCEDURE — 99999 PR PBB SHADOW E&M-EST. PATIENT-LVL IV: ICD-10-PCS | Mod: PBBFAC,,, | Performed by: PHYSICIAN ASSISTANT

## 2019-04-09 PROCEDURE — 1100F PTFALLS ASSESS-DOCD GE2>/YR: CPT | Mod: CPTII,S$GLB,, | Performed by: PHYSICIAN ASSISTANT

## 2019-04-09 RX ORDER — OXYCODONE AND ACETAMINOPHEN 10; 325 MG/1; MG/1
1 TABLET ORAL EVERY 6 HOURS PRN
Qty: 60 TABLET | Refills: 0 | Status: SHIPPED | OUTPATIENT
Start: 2019-04-09 | End: 2019-05-24 | Stop reason: SDUPTHER

## 2019-04-09 NOTE — PROGRESS NOTES
"Subjective:     Patient ID:  Franko Buchanan is a 69 y.o. male.    Naldo    Chief Complaint: Back and bilateral leg pain, right greater than left    HPI    Franko Buchanan is a 69 y.o. male who presents for follow up to discuss surgical options with Dr. Hitchcock    Pain in the right hip and leg has been about two years and has gotten worse in the last 3-4 months.  Pain primarily in the right hip and some in the left hip.  Pain radiates to the right hip and buttock region with radiation down the right anterior and lateral leg to the top of the right foot.  Occasional back pain.  Constant right hip pain and the pain in the right leg comes and goes.  Pain worse with standing and walking and better with laying down.  No left leg pain.    Patient takes advil and occasional percocet.    He was not able to get the caudal FAUZIA done today because he didn't have someone with him.  He would like to hold off on this for now.     Patient denies any recent accidents or trauma, no saddle anesthesias, and no bowel or bladder incontinence.      Review of Systems:  Constitution: Negative for chills, fever, night sweats and weight loss.   Musculoskeletal: Negative for falls.   Gastrointestinal: Negative for bowel incontinence, nausea and vomiting.   Genitourinary: Negative for bladder incontinence.   Neurological: Negative for disturbances in coordination and loss of balance.      Objective:      Vitals:    04/09/19 1506   BP: (!) 151/89   Pulse: 61   Temp: 97.8 °F (36.6 °C)   TempSrc: Oral   Weight: 104.3 kg (230 lb)   Height: 6' 1" (1.854 m)   PainSc:   8   PainLoc: Back         Physical Exam:    General:  Franko Buchanan is well-developed, well-nourished, appears stated age, in no acute distress, alert and oriented to person, place, and time.    Patient sits comfortably in the exam room and answers questions appropriately. Grossly patient is able to move bilateral lower extremities without difficulty.     XRAY/MRI Interpretation: "      Lumbar spine ap/lateral/flexion/extension xrays were personally reviewed today and also reviewed with Dr. Hitchcock.  No fractures.  No movement on flexion and extension.  Multilevel DDD and spondylosis.  Grade one spondylolisthesis L4-5 and L5-S1.       Lumbar spine MRI was personally reviewed today and also reviewed with Dr. Hitchcock. Multilevel DDD and spondylosis.  Grade one spondylolisthesis L4-5 and L5-S1.  Previous left laminectomy at L3-4 and L5-S1.  Severe central and bilateral NFS at L4-5.  Bilateral NFS at L3-4 and L5-S1.    Assessment:          1. Chronic bilateral low back pain with bilateral sciatica    2. DDD (degenerative disc disease), lumbar    3. Other spondylosis with radiculopathy, lumbar region    4. Lumbar radiculopathy    5. Spinal stenosis, lumbar region with neurogenic claudication    6. Spondylolisthesis, lumbar region            Plan:          Orders Placed This Encounter    oxyCODONE-acetaminophen (PERCOCET)  mg per tablet     Multilevel DDD and spondylosis.  L4-5 and L5-S1 grade one spondylolisthesis with bilateral NFS at L3-4 and L5-S1 and severe central stenosis at L4-5.     -Percocet PRN  -Dr. Hitchcock recommends a L3-4, L4-5 XLIF, L5-S1 TLIF, L4-5 laminectomy, and L3-S1 posterior instrumentation.  All of the attendant risks and benefits of the surgery were discussed in detail to the patient by Dr. Hitchcock and the patient wishes to proceed with surgery.    All of the above discussed and reviewed with Dr. Hitchcock who came in to see the patient today.    Follow-Up:  Follow up if symptoms worsen or fail to improve. If there are any questions prior to this, the patient was instructed to contact the office.       Florence Payan, [facility], PA-C  Neurosurgery  [facility]  [facility]

## 2019-04-10 ENCOUNTER — PATIENT MESSAGE (OUTPATIENT)
Dept: SPINE | Facility: CLINIC | Age: 69
End: 2019-04-10

## 2019-04-12 ENCOUNTER — PATIENT MESSAGE (OUTPATIENT)
Dept: FAMILY MEDICINE | Facility: CLINIC | Age: 69
End: 2019-04-12

## 2019-04-12 ENCOUNTER — CLINICAL SUPPORT (OUTPATIENT)
Dept: REHABILITATION | Facility: HOSPITAL | Age: 69
End: 2019-04-12
Attending: PHYSICIAN ASSISTANT
Payer: COMMERCIAL

## 2019-04-12 DIAGNOSIS — E53.8 LOW SERUM VITAMIN B12: ICD-10-CM

## 2019-04-12 DIAGNOSIS — D64.9 ANEMIA, UNSPECIFIED TYPE: Primary | ICD-10-CM

## 2019-04-12 DIAGNOSIS — M62.81 MUSCLE WEAKNESS: ICD-10-CM

## 2019-04-12 DIAGNOSIS — M25.60 STIFFNESS OF UNSPECIFIED JOINT, NOT ELSEWHERE CLASSIFIED: ICD-10-CM

## 2019-04-12 DIAGNOSIS — M43.6 STIFFNESS OF CERVICAL SPINE: ICD-10-CM

## 2019-04-12 DIAGNOSIS — R26.2 DIFFICULTY WALKING: ICD-10-CM

## 2019-04-12 DIAGNOSIS — R73.09 ABNORMAL GLUCOSE: Primary | ICD-10-CM

## 2019-04-12 PROCEDURE — 97110 THERAPEUTIC EXERCISES: CPT

## 2019-04-12 PROCEDURE — 97140 MANUAL THERAPY 1/> REGIONS: CPT

## 2019-04-12 NOTE — LETTER
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 This is a statement of his physical health in relation to his work practice abilities. He has had progressive issues with neck and back pain along with arm weakness.  He is under the care of a neurosurgeon as well.  He recently had cervical diskectomy done in November 2018 for progressive upper extremity  pain and weakness.  He has had persistent issues with this even after his surgery, along with persistent lumbar radiculopathy issues that have caused pain and weakness in his legs and for which he is undergoing treatment with neurosurgery.  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    Please contact me if you have any further questions or concerns.      Sincerely,          Loyd Garcia MD

## 2019-04-12 NOTE — PROGRESS NOTES
Physical Therapy Daily Treatment Note     Name: Franko Buchanan  Clinic Number: [id number]    Therapy Diagnosis:   Encounter Diagnoses   Name Primary?    Muscle weakness     Stiffness of cervical spine     Stiffness of unspecified joint, not elsewhere classified     Difficulty walking      Physician: Florence Payan, *    Visit Date: 4/12/2019    Physician Orders: PT Eval and Treat   Medical Diagnosis from Referral:   M54.5,G89.29 (ICD-10-CM) - Chronic bilateral low back pain, with sciatica presence unspecified   Z98.1 (ICD-10-CM) - S/P cervical spinal fusion   R29.898 (ICD-10-CM) - Left arm weakness   Evaluation Date: 2/26/2019  Authorization Period Expiration: 12-     Plan of Care Expiration: 05-  Visit # / Visits authorized: 8/30     Time In: 10:02 AM  Time Out: 10:59 AM   Total Billable Time: 55 minutes     Precautions: Standard and A-fib, Sleep Apnea, Cervical Fusion (Anterior)    Subjective     Pt reports that he still has the most stiffness and pain in the mornings.       He was compliant with home exercise program.    Response to previous treatment: No adverse effects   Functional change: No significant change    Objective     Franko received the following manual therapy techniques for 10 minutes, incdluing:   - Hook-Lying Lumbar Distraction w/ Mob Belt  - Sub-Occipital Release  BELOW NOT PERFORMED TODAY  - B Lumbar Roll Stretch  - Sub-Occipital Flexion Stretch  - Sub-Occipital Flexion Stretch with Side-Bending R  - B Long-Axis Hip Distraciton    Franko received therapeutic exercises to develop strength, endurance, ROM, flexibility, posture and core stabilization for 45 minutes including:  - Hook-Lying Chin Tucks - 30x10 sec.  - Hook-Lying LTR's w/ Hip ADD Ball Squeeze - 3 min.  - B Supine Active Quad & Contralateral HS Stretch @ EOB - 10 sec. Holds 2 Min. Each  - Hook-Lying Bridges w/ Clamshell Iso. - 3 min. @ 2 sec. Green TB  - Hook-Lying Phsyioball Abdominal Presses - 3 min.  - B  Side-Lying Clamshells - 2 min. Each w/ Green TB  - B Lateral Step-Ups - 2 min. Each on Yellow Box  - Split Stance Shoulder Rows - 3 min. Green TB  - Split Stance Shoulder Ext. - 3 min. Green TB  - Split Stance Shoulder Scaption - 2 min. [place] TB  BELOW NOT PERFORMED TODAY  - DKTC w/ SB 3 min w/ 5 sec hold  - Hip abduction iso in hook lying with pilates ring 3 x 10 3 sec hold    - L Upper Trap Stretch - 4x30 sec .  - B Standing Eccentric Scaption - 4x1 min. 1 lbs. DB's  - B Prone Aquaman (Multifidus Exercise) - 2 min. np    Home Exercises Provided and Patient Education Provided     Education provided:   - Patient educated on proper exercise technique. Patient was given more exercises to do at home per his request.    Written Home Exercises Provided: Patient instructed to cont prior HEP.   Exercises were reviewed and Franko was able to demonstrate them prior to the end of the session.  Franko demonstrated good  understanding of the education provided.     Assessment     - The patient is demonstrating improved activity tolerance compared to previous treatment session as he is able to more easily transfer and walk as well as perform his exercises with less fatigue. He does still have significant L shoulder weakness and fatigues quickly. He is still compensating significantly with excessive scapular elevation while performing scaption.    Franko is progressing well towards his goals.     Pt prognosis is Good.     Pt will continue to benefit from skilled outpatient physical therapy to address the deficits listed in the problem list box on initial evaluation, provide pt/family education and to maximize pt's level of independence in the home and community environment.     Pt's spiritual, cultural and educational needs considered and pt agreeable to plan of care and goals.     Anticipated barriers to physical therapy: none    Goals: Goals:  Short Term Goals: 5 weeks   1. Patient will demonstrate improved lumbar AROM to  below levels in order to improve mobility during ambulation and other daily activities: (progressing, not met)   Extension 10      Left Side Bending 10   Right Side Bending 10   Left Rotation    85%   Right Rotation    85%   2. Patient will demonstrate improved LUE strength to below levels in order to improve ability to manipulate objects on a daily basis:(progressing, not met)     Left   Shoulder Flexion 4/5   Shoulder Abduction 4/5   Shoulder ER 4/5   Dynamometer  62.5 lbs.   3. Patient will report being able to stand and walk for 1 hour or longer without increased pain in order to improve community participation.(progressing, not met)   4. Patient will demonstrate improved functional ability by showing 30% or less limitation according to the FOTO.(progressing, not met)      Long Term Goals: 10 weeks   1. Patient will demonstrate improved lumbar AROM to below levels in order to improve mobility during ambulation and other daily activities:(progressing, not met)   Left Side Bending 15   Right Side Bending 15   Left Rotation    75%   Right Rotation    75%   2. Patient will demonstrate improved LUE strength to below levels in order to improve ability to manipulate objects on a daily basis:(progressing, not met)     Left   Shoulder Flexion 4-/5   Shoulder Abduction 4-/5   Shoulder ER 4-/5   Elbow Flexion 5/5   Elbow Extension 5/5   Dynamometer  57.5 lbs.   3. Patient will report being able to stand and walk for 30 minutes or longer without increased pain in order to improve community participation.(progressing, not met)   4. Patient will demonstrate improved functional ability by showing 20% or less limitation according to the FOTO.(progressing, not met)     Plan     Continue to progress towards LTG's.  Continue to improve trunk mobility and UE strength.      Negrito Monsivais, PT, DPT

## 2019-04-15 ENCOUNTER — CLINICAL SUPPORT (OUTPATIENT)
Dept: REHABILITATION | Facility: HOSPITAL | Age: 69
End: 2019-04-15
Attending: PHYSICIAN ASSISTANT
Payer: COMMERCIAL

## 2019-04-15 ENCOUNTER — PATIENT MESSAGE (OUTPATIENT)
Dept: FAMILY MEDICINE | Facility: CLINIC | Age: 69
End: 2019-04-15

## 2019-04-15 DIAGNOSIS — M25.60 STIFFNESS OF UNSPECIFIED JOINT, NOT ELSEWHERE CLASSIFIED: ICD-10-CM

## 2019-04-15 DIAGNOSIS — M43.6 STIFFNESS OF CERVICAL SPINE: ICD-10-CM

## 2019-04-15 DIAGNOSIS — M62.81 MUSCLE WEAKNESS: ICD-10-CM

## 2019-04-15 DIAGNOSIS — R26.2 DIFFICULTY WALKING: ICD-10-CM

## 2019-04-15 PROCEDURE — 97140 MANUAL THERAPY 1/> REGIONS: CPT

## 2019-04-15 PROCEDURE — 97110 THERAPEUTIC EXERCISES: CPT

## 2019-04-15 NOTE — PROGRESS NOTES
Physical Therapy Daily Treatment Note     Name: Franko Buchanan  Clinic Number: [id number]    Therapy Diagnosis:   Encounter Diagnoses   Name Primary?    Muscle weakness     Stiffness of cervical spine     Stiffness of unspecified joint, not elsewhere classified     Difficulty walking      Physician: Florence Payan, *    Visit Date: 4/15/2019    Physician Orders: PT Eval and Treat   Medical Diagnosis from Referral:   M54.5,G89.29 (ICD-10-CM) - Chronic bilateral low back pain, with sciatica presence unspecified   Z98.1 (ICD-10-CM) - S/P cervical spinal fusion   R29.898 (ICD-10-CM) - Left arm weakness   Evaluation Date: 2/26/2019  Authorization Period Expiration: 12-     Plan of Care Expiration: 05-  Visit # / Visits authorized: 9/30     Time In: 1005  Time Out: 1100  Total Billable Time: 55 minutes     Precautions: Standard and A-fib, Sleep Apnea, Cervical Fusion (Anterior)    Subjective     Pt cont to have stiffness and discomfort in R hip, low back, and neck but is improving.    He was compliant with home exercise program.    Response to previous treatment: No adverse effects   Functional change: No significant change    Pain: 3/10   Neck    Objective     Franko received the following manual therapy techniques for 10 minutes, incdluing:   - Hook-Lying Lumbar Distraction w/ Mob Belt  - Sub-Occipital Release    BELOW NOT PERFORMED TODAY  - B Lumbar Roll Stretch  - Sub-Occipital Flexion Stretch  - Sub-Occipital Flexion Stretch with Side-Bending R  - B Long-Axis Hip Distraciton    Franko received therapeutic exercises to develop strength, endurance, ROM, flexibility, posture and core stabilization for 45 minutes including:  - Hook-Lying Chin Tucks - 30x10 sec.  - Hook-Lying LTR's w/ Hip ADD Ball Squeeze - 3 min.  - Hook-Lying Bridges w/ Clamshell Iso. - 1 min x 3 . @ 2 sec. Blue band   - B Side-Lying Clamshells - 2 min. Each w/ Green TB  - B Lateral Step-Ups - 2 min. Each on Yellow Box  -  Split Stance Shoulder Rows - 1 min x 3. blue TB  - Split Stance Shoulder Ext. - 3 min. Green TB  - Split Stance Shoulder Scaption - 3 min. [place] TB    BELOW NOT PERFORMED TODAY  - DKTC w/ SB 3 min w/ 5 sec hold  - B Supine Active Quad & Contralateral HS Stretch @ EOB - 10 sec. Holds 2 Min. Each  - Hook-Lying Phsyioball Abdominal Presses - 3 min.  - Hip abduction iso in hook lying with pilates ring 3 x 10 3 sec hold    - L Upper Trap Stretch - 4x30 sec .  - B Standing Eccentric Scaption - 4x1 min. 1 lbs. DB's  - B Prone Aquaman (Multifidus Exercise) - 2 min. np    Home Exercises Provided and Patient Education Provided     Education provided:   - Patient educated on proper exercise technique. Patient was given more exercises to do at home per his request.    Written Home Exercises Provided: Patient instructed to cont prior HEP.   Exercises were reviewed and Franko was able to demonstrate them prior to the end of the session.  Franko demonstrated good  understanding of the education provided.     Assessment   Pt showed increased strength and endurance during therex.  Pt cont to lack some UE/LE strength.  Pt oziel tx well w/ no c/o pn.  Pt experienced numbness in L fingers and arm w/ UE exercises.      Franko is progressing well towards his goals.     Pt prognosis is Good.     Pt will continue to benefit from skilled outpatient physical therapy to address the deficits listed in the problem list box on initial evaluation, provide pt/family education and to maximize pt's level of independence in the home and community environment.     Pt's spiritual, cultural and educational needs considered and pt agreeable to plan of care and goals.     Anticipated barriers to physical therapy: none    Goals: Goals:  Short Term Goals: 5 weeks   1. Patient will demonstrate improved lumbar AROM to below levels in order to improve mobility during ambulation and other daily activities: (progressing, not met)   Extension 10      Left Side  Bending 10   Right Side Bending 10   Left Rotation    85%   Right Rotation    85%   2. Patient will demonstrate improved LUE strength to below levels in order to improve ability to manipulate objects on a daily basis:(progressing, not met)     Left   Shoulder Flexion 4/5   Shoulder Abduction 4/5   Shoulder ER 4/5   Dynamometer  62.5 lbs.   3. Patient will report being able to stand and walk for 1 hour or longer without increased pain in order to improve community participation.(progressing, not met)   4. Patient will demonstrate improved functional ability by showing 30% or less limitation according to the FOTO.(progressing, not met)      Long Term Goals: 10 weeks   1. Patient will demonstrate improved lumbar AROM to below levels in order to improve mobility during ambulation and other daily activities:(progressing, not met)   Left Side Bending 15   Right Side Bending 15   Left Rotation    75%   Right Rotation    75%   2. Patient will demonstrate improved LUE strength to below levels in order to improve ability to manipulate objects on a daily basis:(progressing, not met)     Left   Shoulder Flexion 4-/5   Shoulder Abduction 4-/5   Shoulder ER 4-/5   Elbow Flexion 5/5   Elbow Extension 5/5   Dynamometer  57.5 lbs.   3. Patient will report being able to stand and walk for 30 minutes or longer without increased pain in order to improve community participation.(progressing, not met)   4. Patient will demonstrate improved functional ability by showing 20% or less limitation according to the FOTO.(progressing, not met)     Plan     Continue to progress towards goals set by PT.   Continue to improve trunk mobility and UE strength.      Luis Manuel Gonzalez, PTA

## 2019-04-15 NOTE — TELEPHONE ENCOUNTER
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 I can certainly indicate this in a letter. Depending on if they need any additional information you may even be able to have Dr. Hitchcock provide any supplemental information from the neurosurgical perspective.     Also,regarding your blood count, the ferritin level and iron saturation were normal range.  I think you can continue your current iron regimen as you have but I do not think you necessarily need to increase it.  I will recheck a CBC and also check a methylmalonic acid level in case you would benefit from some additional B12 supplementation given your low normal B12 level from recent testing. (i'll also add a folate level--not really useful for fatigue workups but in light of the mild anemia it's reasonable to rule this out as an issue)    Also I looked back in the chart and your hemoglobin is  pretty much at baseline.  It seems like you tend to run in the 13 range fairly chronically.  Since you are caught up on your colon ca screening, this may just be your normal. There is also the possibility of anemia of chronic disease (which may actually be more likely given that iron levels are normal and this condition can commonly happen in chronic inflammatory states like RA).  It does not seem to be low enough to where I am concerned about surgical implications, but certainly if there is something like B12 deficiency that is accounting for it based on the lab work above this is reasonable to supplement.    I'll put the updated lab orders in the computer and you can get these done at anytime.     Loyd          ----- Message -----     From: Franko Buchanan     Sent: 4/12/2019  1:09 PM CDT       To: Loyd Garcia MD  Subject: Non-Urgent Medical    I will be having another back procedure done by Neurosurgeon Dr. Hitchcock on August 7 at [facility]. (L3-S1 fusion) Dr. Hitchcock recommends we try to reduce my anemia prior to surgery.  "Your recommendations? I take an iron supplement twice a week but will increase this to daily so that I can raise my blood count.   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 It has even affected my ability to do office exams. 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 When I am honest with myself about my present physical problems, the ability to continue full time work is, regrettably, a reality.   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 This request relates to maintaining an insurance policy agreed to in a divorce settlement. 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 This letter would document "officially" my current health status in regard to maintaining this policy. Thank you so very much.    Adam Buchanan  "

## 2019-04-16 ENCOUNTER — TELEPHONE (OUTPATIENT)
Dept: SPINE | Facility: CLINIC | Age: 69
End: 2019-04-16

## 2019-04-16 DIAGNOSIS — M48.061 SPINAL STENOSIS OF LUMBAR REGION WITHOUT NEUROGENIC CLAUDICATION: ICD-10-CM

## 2019-04-16 DIAGNOSIS — Z98.1 S/P LUMBAR FUSION: Primary | ICD-10-CM

## 2019-04-16 DIAGNOSIS — M48.04 SPINAL STENOSIS OF THORACIC REGION: Primary | ICD-10-CM

## 2019-04-17 ENCOUNTER — PATIENT MESSAGE (OUTPATIENT)
Dept: SPINE | Facility: CLINIC | Age: 69
End: 2019-04-17

## 2019-04-17 NOTE — TELEPHONE ENCOUNTER
Please email Dr. Buchanan the letter from Dr. Hitchcock.    Florence Payan, MEAGAN, PA-C  Neurosurgery  [facility]  [facility]

## 2019-04-18 ENCOUNTER — LAB VISIT (OUTPATIENT)
Dept: LAB | Facility: HOSPITAL | Age: 69
End: 2019-04-18
Attending: FAMILY MEDICINE
Payer: COMMERCIAL

## 2019-04-18 DIAGNOSIS — E53.8 LOW SERUM VITAMIN B12: ICD-10-CM

## 2019-04-18 DIAGNOSIS — D64.9 ANEMIA, UNSPECIFIED TYPE: ICD-10-CM

## 2019-04-18 DIAGNOSIS — Z01.818 PRE-OP TESTING: ICD-10-CM

## 2019-04-18 DIAGNOSIS — R73.09 ABNORMAL GLUCOSE: ICD-10-CM

## 2019-04-18 LAB
APTT BLDCRRT: 30 SEC (ref 21–32)
BASOPHILS # BLD AUTO: 0.02 K/UL (ref 0–0.2)
BASOPHILS NFR BLD: 0.2 % (ref 0–1.9)
DIFFERENTIAL METHOD: ABNORMAL
EOSINOPHIL # BLD AUTO: 0.2 K/UL (ref 0–0.5)
EOSINOPHIL NFR BLD: 2 % (ref 0–8)
ERYTHROCYTE [DISTWIDTH] IN BLOOD BY AUTOMATED COUNT: 15.1 % (ref 11.5–14.5)
ESTIMATED AVG GLUCOSE: 105 MG/DL (ref 68–131)
FOLATE SERPL-MCNC: 16.2 NG/ML (ref 4–24)
HBA1C MFR BLD HPLC: 5.3 % (ref 4–5.6)
HCT VFR BLD AUTO: 41.9 % (ref 40–54)
HGB BLD-MCNC: 13.9 G/DL (ref 14–18)
INR PPP: 1 (ref 0.8–1.2)
LYMPHOCYTES # BLD AUTO: 3.1 K/UL (ref 1–4.8)
LYMPHOCYTES NFR BLD: 36 % (ref 18–48)
MCH RBC QN AUTO: 30 PG (ref 27–31)
MCHC RBC AUTO-ENTMCNC: 33.2 G/DL (ref 32–36)
MCV RBC AUTO: 91 FL (ref 82–98)
MONOCYTES # BLD AUTO: 0.6 K/UL (ref 0.3–1)
MONOCYTES NFR BLD: 6.5 % (ref 4–15)
NEUTROPHILS # BLD AUTO: 4.7 K/UL (ref 1.8–7.7)
NEUTROPHILS NFR BLD: 55.3 % (ref 38–73)
PLATELET # BLD AUTO: 228 K/UL (ref 150–350)
PMV BLD AUTO: 10.6 FL (ref 9.2–12.9)
PROTHROMBIN TIME: 10.7 SEC (ref 9–12.5)
RBC # BLD AUTO: 4.63 M/UL (ref 4.6–6.2)
WBC # BLD AUTO: 8.49 K/UL (ref 3.9–12.7)

## 2019-04-18 PROCEDURE — 83921 ORGANIC ACID SINGLE QUANT: CPT

## 2019-04-18 PROCEDURE — 83036 HEMOGLOBIN GLYCOSYLATED A1C: CPT

## 2019-04-18 PROCEDURE — 36415 COLL VENOUS BLD VENIPUNCTURE: CPT

## 2019-04-18 PROCEDURE — 85610 PROTHROMBIN TIME: CPT

## 2019-04-18 PROCEDURE — 85730 THROMBOPLASTIN TIME PARTIAL: CPT

## 2019-04-18 PROCEDURE — 85025 COMPLETE CBC W/AUTO DIFF WBC: CPT

## 2019-04-18 PROCEDURE — 82746 ASSAY OF FOLIC ACID SERUM: CPT

## 2019-04-23 LAB — METHYLMALONATE SERPL-SCNC: 0.21 UMOL/L

## 2019-04-26 ENCOUNTER — CLINICAL SUPPORT (OUTPATIENT)
Dept: REHABILITATION | Facility: HOSPITAL | Age: 69
End: 2019-04-26
Attending: PHYSICIAN ASSISTANT
Payer: COMMERCIAL

## 2019-04-26 DIAGNOSIS — M25.60 STIFFNESS OF UNSPECIFIED JOINT, NOT ELSEWHERE CLASSIFIED: ICD-10-CM

## 2019-04-26 DIAGNOSIS — M43.6 STIFFNESS OF CERVICAL SPINE: ICD-10-CM

## 2019-04-26 DIAGNOSIS — M62.81 MUSCLE WEAKNESS: ICD-10-CM

## 2019-04-26 DIAGNOSIS — R26.2 DIFFICULTY WALKING: ICD-10-CM

## 2019-04-26 PROCEDURE — 97140 MANUAL THERAPY 1/> REGIONS: CPT

## 2019-04-26 PROCEDURE — 97110 THERAPEUTIC EXERCISES: CPT

## 2019-04-26 NOTE — PROGRESS NOTES
Physical Therapy Daily Treatment Note     Name: Franko Buchanan  Clinic Number: [id number]    Therapy Diagnosis:   Encounter Diagnoses   Name Primary?    Muscle weakness     Stiffness of cervical spine     Stiffness of unspecified joint, not elsewhere classified     Difficulty walking      Physician: Florence Payan, *    Visit Date: 4/26/2019    Physician Orders: PT Eval and Treat   Medical Diagnosis from Referral:   M54.5,G89.29 (ICD-10-CM) - Chronic bilateral low back pain, with sciatica presence unspecified   Z98.1 (ICD-10-CM) - S/P cervical spinal fusion   R29.898 (ICD-10-CM) - Left arm weakness   Evaluation Date: 2/26/2019  Authorization Period Expiration: 12-     Plan of Care Expiration: 05-  Visit # / Visits authorized: 10/30     Time In: 10:02 AM  Time Out: 10:56 AM  Total Billable Time: 49 minutes     Precautions: Standard and A-fib, Sleep Apnea, Cervical Fusion (Anterior)    Subjective     Pt reports that he's progressing but it's slower than he would like.    He was compliant with home exercise program.    Response to previous treatment: No adverse effects   Functional change: No significant change    Objective     Franko received the following manual therapy techniques for 9 minutes, incdluing:   - Hook-Lying Lumbar Distraction w/ Mob Belt  - Sub-Occipital Release  BELOW NOT PERFORMED TODAY  - B Lumbar Roll Stretch  - Sub-Occipital Flexion Stretch  - Sub-Occipital Flexion Stretch with Side-Bending R  - B Long-Axis Hip Distraciton    Franko received therapeutic exercises to develop strength, endurance, ROM, flexibility, posture and core stabilization for 40 minutes including:  - Hook-Lying Chin Tucks - 20x10 sec.  - B Side-Lying Open Book Stretch w/ Foam Roll - 15x10 sec.  - Hook-Lying LTR's w/ Hip ADD Ball Squeeze - 3 min.  - Hook-Lying Bridges w/ Clamshell Iso. - 2 min @ 2 sec. Blue TB  - B Side-Lying Clamshells - 2 min. Each w/ Blue TB  - B Hammer Leg Extensions - 3 min. w/ 5  lbs.  - B Split Stance Shoulder Rows - 3 min. [place] TB  - B Split Stance Shoulder Ext. - 3 min. Blue TB  - B Split Stance Shoulder Scaption - 3 min. [place] TB  BELOW NOT PERFORMED TODAY  - B Lateral Step-Ups - 2 min. Each on Yellow Box  - DKTC w/ SB 3 min w/ 5 sec hold  - B Supine Active Quad & Contralateral HS Stretch @ EOB - 10 sec. Holds 2 Min. Each  - Hook-Lying Phsyioball Abdominal Presses - 3 min.  - Hip abduction iso in hook lying with pilates ring 3 x 10 3 sec hold    - L Upper Trap Stretch - 4x30 sec .  - B Standing Eccentric Scaption - 4x1 min. 1 lbs. DB's  - B Prone Aquaman (Multifidus Exercise) - 2 min. np    Home Exercises Provided and Patient Education Provided     Education provided:   - Patient educated on proper exercise technique. Patient was given more exercises to do at home per his request.    Written Home Exercises Provided: Patient instructed to cont prior HEP.   Exercises were reviewed and Franko was able to demonstrate them prior to the end of the session.  Franko demonstrated good  understanding of the education provided.     Assessment     - Patient still has significant difficulty with shoulder scaption on the L side secondary to muscle weakness and neurovascular fatigue. He compensates with the common form of excessive upper trap and levator scap activation. He does demonstrate an improved ability to perform a proper chin tuck and maintain this contraction.    Franko is progressing well towards his goals.     Pt prognosis is Good.     Pt will continue to benefit from skilled outpatient physical therapy to address the deficits listed in the problem list box on initial evaluation, provide pt/family education and to maximize pt's level of independence in the home and community environment.     Pt's spiritual, cultural and educational needs considered and pt agreeable to plan of care and goals.     Anticipated barriers to physical therapy: none    Goals: Goals:  Short Term Goals: 5 weeks    1. Patient will demonstrate improved lumbar AROM to below levels in order to improve mobility during ambulation and other daily activities: (progressing, not met)   Extension 10      Left Side Bending 10   Right Side Bending 10   Left Rotation    85%   Right Rotation    85%   2. Patient will demonstrate improved LUE strength to below levels in order to improve ability to manipulate objects on a daily basis:(progressing, not met)     Left   Shoulder Flexion 4/5   Shoulder Abduction 4/5   Shoulder ER 4/5   Dynamometer  62.5 lbs.   3. Patient will report being able to stand and walk for 1 hour or longer without increased pain in order to improve community participation.(progressing, not met)   4. Patient will demonstrate improved functional ability by showing 30% or less limitation according to the FOTO.(progressing, not met)      Long Term Goals: 10 weeks   1. Patient will demonstrate improved lumbar AROM to below levels in order to improve mobility during ambulation and other daily activities:(progressing, not met)   Left Side Bending 15   Right Side Bending 15   Left Rotation    75%   Right Rotation    75%   2. Patient will demonstrate improved LUE strength to below levels in order to improve ability to manipulate objects on a daily basis:(progressing, not met)     Left   Shoulder Flexion 4-/5   Shoulder Abduction 4-/5   Shoulder ER 4-/5   Elbow Flexion 5/5   Elbow Extension 5/5   Dynamometer  57.5 lbs.   3. Patient will report being able to stand and walk for 30 minutes or longer without increased pain in order to improve community participation.(progressing, not met)   4. Patient will demonstrate improved functional ability by showing 20% or less limitation according to the FOTO.(progressing, not met)     Plan     Continue to progress towards goals set by PT.   Continue to improve trunk mobility and UE strength.      Negrito Monsivais, PT

## 2019-04-29 ENCOUNTER — CLINICAL SUPPORT (OUTPATIENT)
Dept: REHABILITATION | Facility: HOSPITAL | Age: 69
End: 2019-04-29
Attending: PHYSICIAN ASSISTANT
Payer: COMMERCIAL

## 2019-04-29 DIAGNOSIS — M43.6 STIFFNESS OF CERVICAL SPINE: ICD-10-CM

## 2019-04-29 DIAGNOSIS — M62.81 MUSCLE WEAKNESS: ICD-10-CM

## 2019-04-29 DIAGNOSIS — M25.60 STIFFNESS OF UNSPECIFIED JOINT, NOT ELSEWHERE CLASSIFIED: ICD-10-CM

## 2019-04-29 DIAGNOSIS — R26.2 DIFFICULTY WALKING: ICD-10-CM

## 2019-04-29 PROCEDURE — 97110 THERAPEUTIC EXERCISES: CPT

## 2019-04-29 PROCEDURE — 97140 MANUAL THERAPY 1/> REGIONS: CPT

## 2019-04-29 NOTE — PROGRESS NOTES
Physical Therapy Daily Treatment Note     Name: Franko Buchanan  Clinic Number: [id number]    Therapy Diagnosis:   Encounter Diagnoses   Name Primary?    Muscle weakness     Stiffness of cervical spine     Stiffness of unspecified joint, not elsewhere classified     Difficulty walking      Physician: Florence Payan, *    Visit Date: 4/29/2019    Physician Orders: PT Eval and Treat   Medical Diagnosis from Referral:   M54.5,G89.29 (ICD-10-CM) - Chronic bilateral low back pain, with sciatica presence unspecified   Z98.1 (ICD-10-CM) - S/P cervical spinal fusion   R29.898 (ICD-10-CM) - Left arm weakness   Evaluation Date: 2/26/2019  Authorization Period Expiration: 12-     Plan of Care Expiration: 05-  Visit # / Visits authorized: 11/30     Time In: 1000  Time Out: 1056  Total Billable Time:   55 minutes     Precautions: Standard and A-fib, Sleep Apnea, Cervical Fusion (Anterior)    Subjective     Pt states feeling okay w/ no c/o pn in neck currently.    He was compliant with home exercise program.    Response to previous treatment: No adverse effects   Functional change: No significant change    Objective     Franko received the following manual therapy techniques for 9 minutes, incdluing:   - Hook-Lying Lumbar Distraction w/ Mob Belt  - Sub-Occipital Release  BELOW NOT PERFORMED TODAY  - B Lumbar Roll Stretch  - Sub-Occipital Flexion Stretch  - Sub-Occipital Flexion Stretch with Side-Bending R  - B Long-Axis Hip Distraciton    Franko received therapeutic exercises to develop strength, endurance, ROM, flexibility, posture and core stabilization for 45 minutes including:  - Hook-Lying Chin Tucks - 20x10 sec.  - B Side-Lying Open Book Stretch - 15x10 sec.  - Hook-Lying LTR's w/ Hip ADD Ball Squeeze - 3 min.  - Hook-Lying Bridges w/ Clamshell Iso. - 2 min @ 2 sec. Blue TB  - B Side-Lying Clamshells - 2 min. Each w/ Blue TB  - B Hammer Leg Extensions - 3 min. w/ 5 lbs.  - B Split Stance Shoulder  Rows - 3 min. Maroon TB  - B Split Stance Shoulder Ext. - 3 min. Blue TB  - B Split Stance Shoulder Scaption - 3 min. Maroon TB  BELOW NOT PERFORMED TODAY  - B Lateral Step-Ups - 2 min. Each on Yellow Box  - DKTC w/ SB 3 min w/ 5 sec hold  - B Supine Active Quad & Contralateral HS Stretch @ EOB - 10 sec. Holds 2 Min. Each  - Hook-Lying Phsyioball Abdominal Presses - 3 min.  - Hip abduction iso in hook lying with pilates ring 3 x 10 3 sec hold    - L Upper Trap Stretch - 4x30 sec .  - B Standing Eccentric Scaption - 4x1 min. 1 lbs. DB's  - B Prone Aquaman (Multifidus Exercise) - 2 min. np    Home Exercises Provided and Patient Education Provided     Education provided:   - Patient educated on proper exercise technique. Patient was given more exercises to do at home per his request.    Written Home Exercises Provided: Patient instructed to cont prior HEP.   Exercises were reviewed and Franko was able to demonstrate them prior to the end of the session.  Franko demonstrated good  understanding of the education provided.     Assessment   Pt demonstrated improved endurance during therex.  Pt oziel tx well w/ no c/o pn.  Pt cont to lakc some strength.      Franko is progressing well towards his goals.     Pt prognosis is Good.     Pt will continue to benefit from skilled outpatient physical therapy to address the deficits listed in the problem list box on initial evaluation, provide pt/family education and to maximize pt's level of independence in the home and community environment.     Pt's spiritual, cultural and educational needs considered and pt agreeable to plan of care and goals.     Anticipated barriers to physical therapy: none    Goals: Goals:  Short Term Goals: 5 weeks   1. Patient will demonstrate improved lumbar AROM to below levels in order to improve mobility during ambulation and other daily activities: (progressing, not met)   Extension 10      Left Side Bending 10   Right Side Bending 10   Left  Rotation    85%   Right Rotation    85%   2. Patient will demonstrate improved LUE strength to below levels in order to improve ability to manipulate objects on a daily basis:(progressing, not met)     Left   Shoulder Flexion 4/5   Shoulder Abduction 4/5   Shoulder ER 4/5   Dynamometer  62.5 lbs.   3. Patient will report being able to stand and walk for 1 hour or longer without increased pain in order to improve community participation.(progressing, not met)   4. Patient will demonstrate improved functional ability by showing 30% or less limitation according to the FOTO.(progressing, not met)      Long Term Goals: 10 weeks   1. Patient will demonstrate improved lumbar AROM to below levels in order to improve mobility during ambulation and other daily activities:(progressing, not met)   Left Side Bending 15   Right Side Bending 15   Left Rotation    75%   Right Rotation    75%   2. Patient will demonstrate improved LUE strength to below levels in order to improve ability to manipulate objects on a daily basis:(progressing, not met)     Left   Shoulder Flexion 4-/5   Shoulder Abduction 4-/5   Shoulder ER 4-/5   Elbow Flexion 5/5   Elbow Extension 5/5   Dynamometer  57.5 lbs.   3. Patient will report being able to stand and walk for 30 minutes or longer without increased pain in order to improve community participation.(progressing, not met)   4. Patient will demonstrate improved functional ability by showing 20% or less limitation according to the FOTO.(progressing, not met)     Plan     Continue to progress towards goals set by PT.   Continue to improve trunk mobility and UE strength.      Luis Manuel Gonzalez, PTA

## 2019-05-06 ENCOUNTER — CLINICAL SUPPORT (OUTPATIENT)
Dept: REHABILITATION | Facility: HOSPITAL | Age: 69
End: 2019-05-06
Attending: PHYSICIAN ASSISTANT
Payer: COMMERCIAL

## 2019-05-06 DIAGNOSIS — M25.60 STIFFNESS OF UNSPECIFIED JOINT, NOT ELSEWHERE CLASSIFIED: ICD-10-CM

## 2019-05-06 DIAGNOSIS — M43.6 STIFFNESS OF CERVICAL SPINE: ICD-10-CM

## 2019-05-06 DIAGNOSIS — M62.81 MUSCLE WEAKNESS: ICD-10-CM

## 2019-05-06 DIAGNOSIS — R26.2 DIFFICULTY WALKING: ICD-10-CM

## 2019-05-06 PROCEDURE — 97140 MANUAL THERAPY 1/> REGIONS: CPT

## 2019-05-06 PROCEDURE — 97110 THERAPEUTIC EXERCISES: CPT

## 2019-05-06 NOTE — PROGRESS NOTES
Physical Therapy Daily Treatment Note     Name: Franko Buchanan  Clinic Number: [id number]    Therapy Diagnosis:   Encounter Diagnoses   Name Primary?    Muscle weakness     Stiffness of cervical spine     Stiffness of unspecified joint, not elsewhere classified     Difficulty walking      Physician: Florence Payan, *    Visit Date: 5/6/2019    Physician Orders: PT Eval and Treat   Medical Diagnosis from Referral:   M54.5,G89.29 (ICD-10-CM) - Chronic bilateral low back pain, with sciatica presence unspecified   Z98.1 (ICD-10-CM) - S/P cervical spinal fusion   R29.898 (ICD-10-CM) - Left arm weakness   Evaluation Date: 2/26/2019  Authorization Period Expiration: 12-     Plan of Care Expiration: 05-  Visit # / Visits authorized: 12/30     Time In: 1001  Time Out: 1100  Total Billable Time:  59 minutes     Precautions: Standard and A-fib, Sleep Apnea, Cervical Fusion (Anterior)    Subjective     Pt reports w/ soreness in low back and some stiffness in neck.  Pt mentioned volunteering at a dog show this weekend.      He was compliant with home exercise program.    Response to previous treatment: No adverse effects   Functional change: No significant change    Objective     Franko received the following manual therapy techniques for 9 minutes, incdluing:   - Sub-Occipital Release  BELOW NOT PERFORMED TODAY  - B Lumbar Roll Stretch  - Sub-Occipital Flexion Stretch  - Sub-Occipital Flexion Stretch with Side-Bending R  - B Long-Axis Hip Distraciton  - Hook-Lying Lumbar Distraction w/ Mob Belt    Franko received therapeutic exercises to develop strength, endurance, ROM, flexibility, posture and core stabilization for 50 minutes including:  - Hook-Lying Chin Tucks - 20x10 sec.  - B Side-Lying Open Book Stretch - 15x10 sec.  - Hook-Lying LTR's w/ Hip ADD Ball Squeeze - 3 min.  - Hook-Lying Bridges w/ Clamshell Iso. - 2.5 min @ 2 sec. Blue TB  - B Side-Lying Clamshells - 2 min. Each w/ Blue TB  - B  Hammer Leg Extensions - 3 min. w/ 5 lbs.  - B Split Stance Shoulder Rows - 3 min. Maroon TB  - B Split Stance Shoulder Ext. - 3 min. Blue TB  - B Split Stance Shoulder Scaption - 3 min. Maroon TB  B Shld ER 1 min ea orange band     BELOW NOT PERFORMED TODAY  - B Lateral Step-Ups - 2 min. Each on Yellow Box  - DKTC w/ SB 3 min w/ 5 sec hold  - B Supine Active Quad & Contralateral HS Stretch @ EOB - 10 sec. Holds 2 Min. Each  - Hook-Lying Phsyioball Abdominal Presses - 3 min.  - Hip abduction iso in hook lying with pilates ring 3 x 10 3 sec hold    - L Upper Trap Stretch - 4x30 sec .  - B Standing Eccentric Scaption - 4x1 min. 1 lbs. DB's  - B Prone Aquaman (Multifidus Exercise) - 2 min. np    Home Exercises Provided and Patient Education Provided     Education provided:   - Patient educated on proper exercise technique. Patient was given more exercises to do at home per his request.    Written Home Exercises Provided: Patient instructed to cont prior HEP.   Exercises were reviewed and Franko was able to demonstrate them prior to the end of the session.  Franko demonstrated good  understanding of the education provided.     Assessment     Pt had in=mproved mobility in neck and back after tx session.  Pn level decreased in low back as well.  Pt showed increased endurance during therex.  Pt cont to lack some strength and endurance.    Franko is progressing well towards his goals.     Pt prognosis is Good.     Pt will continue to benefit from skilled outpatient physical therapy to address the deficits listed in the problem list box on initial evaluation, provide pt/family education and to maximize pt's level of independence in the home and community environment.     Pt's spiritual, cultural and educational needs considered and pt agreeable to plan of care and goals.     Anticipated barriers to physical therapy: none    Goals: Goals:  Short Term Goals: 5 weeks   1. Patient will demonstrate improved lumbar AROM to below  levels in order to improve mobility during ambulation and other daily activities: (progressing, not met)   Extension 10      Left Side Bending 10   Right Side Bending 10   Left Rotation    85%   Right Rotation    85%   2. Patient will demonstrate improved LUE strength to below levels in order to improve ability to manipulate objects on a daily basis:(progressing, not met)     Left   Shoulder Flexion 4/5   Shoulder Abduction 4/5   Shoulder ER 4/5   Dynamometer  62.5 lbs.   3. Patient will report being able to stand and walk for 1 hour or longer without increased pain in order to improve community participation.(progressing, not met)   4. Patient will demonstrate improved functional ability by showing 30% or less limitation according to the FOTO.(progressing, not met)      Long Term Goals: 10 weeks   1. Patient will demonstrate improved lumbar AROM to below levels in order to improve mobility during ambulation and other daily activities:(progressing, not met)   Left Side Bending 15   Right Side Bending 15   Left Rotation    75%   Right Rotation    75%   2. Patient will demonstrate improved LUE strength to below levels in order to improve ability to manipulate objects on a daily basis:(progressing, not met)     Left   Shoulder Flexion 4-/5   Shoulder Abduction 4-/5   Shoulder ER 4-/5   Elbow Flexion 5/5   Elbow Extension 5/5   Dynamometer  57.5 lbs.   3. Patient will report being able to stand and walk for 30 minutes or longer without increased pain in order to improve community participation.(progressing, not met)   4. Patient will demonstrate improved functional ability by showing 20% or less limitation according to the FOTO.(progressing, not met)     Plan     Continue to progress towards goals set by PT.   Continue to improve trunk mobility and UE strength.      Luis Manuel Gonzalez, PTA

## 2019-05-07 ENCOUNTER — PATIENT MESSAGE (OUTPATIENT)
Dept: FAMILY MEDICINE | Facility: CLINIC | Age: 69
End: 2019-05-07

## 2019-05-10 ENCOUNTER — CLINICAL SUPPORT (OUTPATIENT)
Dept: REHABILITATION | Facility: HOSPITAL | Age: 69
End: 2019-05-10
Attending: FAMILY MEDICINE
Payer: COMMERCIAL

## 2019-05-10 DIAGNOSIS — M25.60 STIFFNESS OF UNSPECIFIED JOINT, NOT ELSEWHERE CLASSIFIED: ICD-10-CM

## 2019-05-10 DIAGNOSIS — R26.2 DIFFICULTY WALKING: ICD-10-CM

## 2019-05-10 DIAGNOSIS — M62.81 MUSCLE WEAKNESS: ICD-10-CM

## 2019-05-10 DIAGNOSIS — M43.6 STIFFNESS OF CERVICAL SPINE: ICD-10-CM

## 2019-05-10 PROCEDURE — 97110 THERAPEUTIC EXERCISES: CPT

## 2019-05-10 PROCEDURE — 97140 MANUAL THERAPY 1/> REGIONS: CPT

## 2019-05-10 NOTE — PROGRESS NOTES
Physical Therapy Daily Treatment Note     Name: Franko Buchanan  Clinic Number: [id number]    Therapy Diagnosis:   Encounter Diagnoses   Name Primary?    Muscle weakness     Stiffness of cervical spine     Stiffness of unspecified joint, not elsewhere classified     Difficulty walking      Physician: Florence Payan, *    Visit Date: 5/10/2019    Physician Orders: PT Eval and Treat   Medical Diagnosis from Referral:   M54.5,G89.29 (ICD-10-CM) - Chronic bilateral low back pain, with sciatica presence unspecified   Z98.1 (ICD-10-CM) - S/P cervical spinal fusion   R29.898 (ICD-10-CM) - Left arm weakness   Evaluation Date: 2/26/2019  Authorization Period Expiration: 12-     Plan of Care Expiration: 05-  Visit # / Visits authorized: 13/30     Time In: 1003  Time Out: 1100  Total Billable Time:  29 minutes     Precautions: Standard and A-fib, Sleep Apnea, Cervical Fusion (Anterior)    Subjective     Pt states feeling well w/ no c/o pn in neck or back.      He was compliant with home exercise program.    Response to previous treatment: No adverse effects   Functional change: No significant change    Objective     Franko received the following manual therapy techniques for 9 minutes, incdluing:   - Sub-Occipital Release  BELOW NOT PERFORMED TODAY  - B Lumbar Roll Stretch  - Sub-Occipital Flexion Stretch  - Sub-Occipital Flexion Stretch with Side-Bending R  - B Long-Axis Hip Distraciton  - Hook-Lying Lumbar Distraction w/ Mob Belt    Franko received therapeutic exercises to develop strength, endurance, ROM, flexibility, posture and core stabilization for 20 minutes including:  - Hook-Lying Chin Tucks - 20x10 sec.  - B Side-Lying Open Book Stretch - 15x10 sec.  - Hook-Lying LTR's w/ Hip ADD Ball Squeeze - 3 min.  - Hook-Lying Bridges w/ Clamshell Iso. - 3 min @ 2 sec. Blue TB  - B Side-Lying Clamshells - 2 min. Each w/ Blue TB  - B Hammer Leg Extensions - 3 min. w/ 5 lbs.  - B Split Stance Shoulder  Rows - 3 min. Maroon TB  - B Split Stance Shoulder Ext. - 3 min. Maroon TB      BELOW NOT PERFORMED TODAY  - B Split Stance Shoulder Scaption - 3 min. Maroon TB  - B Shld ER 1 min ea orange band     Home Exercises Provided and Patient Education Provided     Education provided:   - Patient educated on proper exercise technique. Patient was given more exercises to do at home per his request.    Written Home Exercises Provided: Patient instructed to cont prior HEP.   Exercises were reviewed and Franko was able to demonstrate them prior to the end of the session.  Franko demonstrated good  understanding of the education provided.     Assessment   Pt displayed increased neck strength during therex.  Pt cont to have some UE weakness.  Pt oziel tx well w/ no c/o pn.      Franko is progressing well towards his goals.     Pt prognosis is Good.     Pt will continue to benefit from skilled outpatient physical therapy to address the deficits listed in the problem list box on initial evaluation, provide pt/family education and to maximize pt's level of independence in the home and community environment.     Pt's spiritual, cultural and educational needs considered and pt agreeable to plan of care and goals.     Anticipated barriers to physical therapy: none    Goals: Goals:  Short Term Goals: 5 weeks   1. Patient will demonstrate improved lumbar AROM to below levels in order to improve mobility during ambulation and other daily activities: (progressing, not met)   Extension 10      Left Side Bending 10   Right Side Bending 10   Left Rotation    85%   Right Rotation    85%   2. Patient will demonstrate improved LUE strength to below levels in order to improve ability to manipulate objects on a daily basis:(progressing, not met)     Left   Shoulder Flexion 4/5   Shoulder Abduction 4/5   Shoulder ER 4/5   Dynamometer  62.5 lbs.   3. Patient will report being able to stand and walk for 1 hour or longer without increased pain in  order to improve community participation.(progressing, not met)   4. Patient will demonstrate improved functional ability by showing 30% or less limitation according to the FOTO.(progressing, not met)      Long Term Goals: 10 weeks   1. Patient will demonstrate improved lumbar AROM to below levels in order to improve mobility during ambulation and other daily activities:(progressing, not met)   Left Side Bending 15   Right Side Bending 15   Left Rotation    75%   Right Rotation    75%   2. Patient will demonstrate improved LUE strength to below levels in order to improve ability to manipulate objects on a daily basis:(progressing, not met)     Left   Shoulder Flexion 4-/5   Shoulder Abduction 4-/5   Shoulder ER 4-/5   Elbow Flexion 5/5   Elbow Extension 5/5   Dynamometer  57.5 lbs.   3. Patient will report being able to stand and walk for 30 minutes or longer without increased pain in order to improve community participation.(progressing, not met)   4. Patient will demonstrate improved functional ability by showing 20% or less limitation according to the FOTO.(progressing, not met)     Plan     Continue to progress towards goals set by PT.   Work to increase scap stability and UE strength next visit.       Luis Manuel Gonzalez, PTA

## 2019-05-13 ENCOUNTER — CLINICAL SUPPORT (OUTPATIENT)
Dept: REHABILITATION | Facility: HOSPITAL | Age: 69
End: 2019-05-13
Attending: PHYSICIAN ASSISTANT
Payer: COMMERCIAL

## 2019-05-13 DIAGNOSIS — M43.6 STIFFNESS OF CERVICAL SPINE: ICD-10-CM

## 2019-05-13 DIAGNOSIS — R26.2 DIFFICULTY WALKING: ICD-10-CM

## 2019-05-13 DIAGNOSIS — M25.60 STIFFNESS OF UNSPECIFIED JOINT, NOT ELSEWHERE CLASSIFIED: ICD-10-CM

## 2019-05-13 DIAGNOSIS — M62.81 MUSCLE WEAKNESS: ICD-10-CM

## 2019-05-13 PROCEDURE — 97110 THERAPEUTIC EXERCISES: CPT | Performed by: PHYSICAL THERAPIST

## 2019-05-13 PROCEDURE — 97140 MANUAL THERAPY 1/> REGIONS: CPT | Performed by: PHYSICAL THERAPIST

## 2019-05-13 NOTE — PLAN OF CARE
Physical Therapy Daily Treatment Note     Name: Franko Buchanan  Clinic Number: [id number]    Therapy Diagnosis:   Encounter Diagnoses   Name Primary?    Muscle weakness     Stiffness of cervical spine     Stiffness of unspecified joint, not elsewhere classified     Difficulty walking      Physician: Florence Payan, *    Visit Date: 5/13/2019    Physician Orders: PT Eval and Treat   Medical Diagnosis from Referral:   M54.5,G89.29 (ICD-10-CM) - Chronic bilateral low back pain, with sciatica presence unspecified   Z98.1 (ICD-10-CM) - S/P cervical spinal fusion   R29.898 (ICD-10-CM) - Left arm weakness   Evaluation Date: 2/26/2019  Authorization Period Expiration: 12-     Plan of Care Expiration: 05-  New Plan of Care Expiration: 9/16/2019  Visit # / Visits authorized: 13/30     Time In: 1003  Time Out: 1100  Total Billable Time:  57 minutes     Precautions: Standard and A-fib, Sleep Apnea, Cervical Fusion (Anterior)    Subjective     Pt states he has progressed well with therapy to the cervical and lumbar spine. He notes weakness in his hips and lingering low back aches. He occasionally gets symptoms down his RLE but not currently. He is scheduled to retire in June and then go on a road trip for [place] and [place]. He will only be able to attend therapy a couple more times prior to his lumbar fusion.   He was compliant with home exercise program.    Response to previous treatment: No adverse effects   Functional change: No significant change    Objective     Lumbar Range of Motion:    Limitations Pain   Flexion 35   no        Extension 0   yes        Left Side Bending 5 no        Right Side Bending 5 no            Lower Extremity Strength  Right LE  Left LE    Quadriceps: 4/5 Quadriceps: 4/5   Hamstrings: 4+/5 Hamstrings: 4+/5   Iliospoas: 3+/5 Iliospoas: 3+/5   Hip extension:  4-/5 Hip extension: 4-/5   PGM: 4-/5 PGM: 4-/5   Hip ER:  4-/5 Hip ER: 4-/5   Hip IR: 4/5 Hip IR: 4/5   Ankle  dorsiflexion: 4+/5 Ankle dorsiflexion: 4+/5   Ankle plantarflexion: 4+/5 Ankle plantarflexion: 4+/5     Sensation:Intact      Joint Mobility:   -Shear testing:L3-L4 hypermobile  -Segmental mobility testing:Limited mobility L2-L3 flexion    Palpation:   -Erector Spinae: No noticeable tone  -Multifidi activation: Fair    LUE Strength: grossly 4+/5 except for flexion, abduction and ER 4-/5. Limited  strength on L      Franko received the following manual therapy techniques for 25 minutes, incdluing:   - Sub-Occipital Release  - Flexion mobilization to lower c-spine  - Lumbar sidelying gapping   -Lumbar flexion Gr III mobilization L2-L4  -Long axis hip distraction on R  BELOW NOT PERFORMED TODAY  - B Lumbar Roll Stretch  - Sub-Occipital Flexion Stretch  - Sub-Occipital Flexion Stretch with Side-Bending R  - Hook-Lying Lumbar Distraction w/ Mob Belt    Franko received therapeutic exercises to develop strength, endurance, ROM, flexibility, posture and core stabilization for 32 minutes including:  - Hook-Lying Chin Tucks - 20x10 sec.  - B Side-Lying Open Book Stretch - 15x10 sec.  - Hook-Lying LTR's w/ Hip ADD Ball Squeeze - 3 min.  - Hook-Lying Bridges w/ Clamshell Iso. - 3 min @ 2 sec. Blue TB  - B Side-Lying Clamshells - 2 min. Each w/ Blue TB  - B LAQ - 3 min. w/ 5 lbs.  -Iliopsoas leg raises 3 x 12  - B Split Stance Shoulder Rows - 3 min. Maroon TB  - B Split Stance Shoulder Ext. - 3 min. Maroon TB      BELOW NOT PERFORMED TODAY  - B Split Stance Shoulder Scaption - 3 min. Maroon TB  - B Shld ER 1 min ea orange band     Home Exercises Provided and Patient Education Provided     Education provided:   - Patient educated on proper chin tuck form to avoid cervical shear forces    Written Home Exercises Provided: Patient instructed to cont prior HEP.   Exercises were reviewed and Franko was able to demonstrate them prior to the end of the session.  Franko demonstrated good  understanding of the education provided.      Assessment   Franko has progressed with strengthening to UE and LE. He did not have any current radicular symptoms and glut strength is progressed. He remains very weak to the L2 myotome and was progressed today. His cervical spine mobility was good and exercise was adjusted to reduce further injury.  He will continue to benefit from strengthening and stabilization prior to undergoing fusion this summer.     Franko is progressing well towards his goals.     Pt prognosis is Good.     Pt will continue to benefit from skilled outpatient physical therapy to address the deficits listed in the problem list box on initial evaluation, provide pt/family education and to maximize pt's level of independence in the home and community environment.     Pt's spiritual, cultural and educational needs considered and pt agreeable to plan of care and goals.     Anticipated barriers to physical therapy: none    Goals: Goals:  Short Term Goals: 5 weeks   1. Patient will demonstrate improved lumbar AROM to below levels in order to improve mobility during ambulation and other daily activities: (progressing, not met)   Extension 10      Left Side Bending 10   Right Side Bending 10   Left Rotation    85%   Right Rotation    85%   2. Patient will demonstrate improved LUE strength to below levels in order to improve ability to manipulate objects on a daily basis:(progressing, not met)     Left   Shoulder Flexion 4/5   Shoulder Abduction 4/5   Shoulder ER 4/5   Dynamometer  62.5 lbs.   3. Patient will report being able to stand and walk for 1 hour or longer without increased pain in order to improve community participation.(progressing, not met)   4. Patient will demonstrate improved functional ability by showing 30% or less limitation according to the FOTO.(progressing, not met)      Long Term Goals: 10 weeks   1. Patient will demonstrate improved lumbar AROM to below levels in order to improve mobility during ambulation and other  daily activities:(progressing, not met)   Left Side Bending 15   Right Side Bending 15   Left Rotation    75%   Right Rotation    75%   2. Patient will demonstrate improved LUE strength to below levels in order to improve ability to manipulate objects on a daily basis:(progressing, not met)     Left   Shoulder Flexion 4-/5   Shoulder Abduction 4-/5   Shoulder ER 4-/5   Elbow Flexion 5/5   Elbow Extension 5/5   Dynamometer  57.5 lbs.   3. Patient will report being able to stand and walk for 30 minutes or longer without increased pain in order to improve community participation.(progressing, not met)   4. Patient will demonstrate improved functional ability by showing 20% or less limitation according to the FOTO.(progressing, not met)       Plan     Updated Certification Period: 5/13/2019 to 9/16/2019  Recommended Treatment Plan: 2 times per week for 8 weeks: Cervical/Lumbar Traction, Manual Therapy, Moist Heat/ Ice, Neuromuscular Re-ed, Patient Education, Self Care, Therapeutic Activites and Therapeutic Exercise  Other Recommendations: Dry needling as necessary    Edison Faria, PT DPT  5/13/2019      I CERTIFY THE NEED FOR THESE SERVICES FURNISHED UNDER THIS PLAN OF TREATMENT AND WHILE UNDER MY CARE    Physician's comments:        Physician's Signature: ___________________________________________________

## 2019-05-24 ENCOUNTER — PATIENT MESSAGE (OUTPATIENT)
Dept: SPINE | Facility: CLINIC | Age: 69
End: 2019-05-24

## 2019-05-24 ENCOUNTER — TELEPHONE (OUTPATIENT)
Dept: SPINE | Facility: CLINIC | Age: 69
End: 2019-05-24

## 2019-05-24 DIAGNOSIS — M54.41 CHRONIC BILATERAL LOW BACK PAIN WITH BILATERAL SCIATICA: Primary | ICD-10-CM

## 2019-05-24 DIAGNOSIS — G89.29 CHRONIC BILATERAL LOW BACK PAIN WITH BILATERAL SCIATICA: Primary | ICD-10-CM

## 2019-05-24 DIAGNOSIS — M54.42 CHRONIC BILATERAL LOW BACK PAIN WITH BILATERAL SCIATICA: Primary | ICD-10-CM

## 2019-05-24 RX ORDER — OXYCODONE AND ACETAMINOPHEN 10; 325 MG/1; MG/1
1 TABLET ORAL EVERY 6 HOURS PRN
Qty: 60 TABLET | Refills: 0 | Status: ON HOLD | OUTPATIENT
Start: 2019-05-24 | End: 2019-08-09 | Stop reason: HOSPADM

## 2019-05-24 NOTE — TELEPHONE ENCOUNTER
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    Florence Payan, MEAGAN, PA-C  Neurosurgery  [facility]  [facility]

## 2019-05-27 DIAGNOSIS — G89.29 CHRONIC BILATERAL LOW BACK PAIN WITH BILATERAL SCIATICA: ICD-10-CM

## 2019-05-27 DIAGNOSIS — M54.42 CHRONIC BILATERAL LOW BACK PAIN WITH BILATERAL SCIATICA: ICD-10-CM

## 2019-05-27 DIAGNOSIS — L81.7 PIGMENTED PURPURIC DERMATOSIS: ICD-10-CM

## 2019-05-27 DIAGNOSIS — M54.41 CHRONIC BILATERAL LOW BACK PAIN WITH BILATERAL SCIATICA: ICD-10-CM

## 2019-05-27 RX ORDER — TRIAMCINOLONE ACETONIDE 1 MG/G
CREAM TOPICAL
Qty: 60 G | Refills: 3 | Status: SHIPPED | OUTPATIENT
Start: 2019-05-27 | End: 2020-12-18

## 2019-05-27 RX ORDER — OXYCODONE AND ACETAMINOPHEN 10; 325 MG/1; MG/1
1 TABLET ORAL EVERY 6 HOURS PRN
Qty: 60 TABLET | Refills: 0 | OUTPATIENT
Start: 2019-05-27

## 2019-07-28 ENCOUNTER — PATIENT MESSAGE (OUTPATIENT)
Dept: SPINE | Facility: CLINIC | Age: 69
End: 2019-07-28

## 2019-07-29 ENCOUNTER — PATIENT MESSAGE (OUTPATIENT)
Dept: SURGERY | Facility: OTHER | Age: 69
End: 2019-07-29

## 2019-07-29 ENCOUNTER — OFFICE VISIT (OUTPATIENT)
Dept: DERMATOLOGY | Facility: CLINIC | Age: 69
End: 2019-07-29
Payer: COMMERCIAL

## 2019-07-29 ENCOUNTER — OFFICE VISIT (OUTPATIENT)
Dept: FAMILY MEDICINE | Facility: CLINIC | Age: 69
End: 2019-07-29
Payer: COMMERCIAL

## 2019-07-29 VITALS
TEMPERATURE: 98 F | BODY MASS INDEX: 30.21 KG/M2 | SYSTOLIC BLOOD PRESSURE: 128 MMHG | HEART RATE: 62 BPM | OXYGEN SATURATION: 97 % | HEIGHT: 73 IN | DIASTOLIC BLOOD PRESSURE: 80 MMHG | WEIGHT: 227.94 LBS

## 2019-07-29 DIAGNOSIS — I48.0 PAROXYSMAL ATRIAL FIBRILLATION: ICD-10-CM

## 2019-07-29 DIAGNOSIS — L81.4 LENTIGO: ICD-10-CM

## 2019-07-29 DIAGNOSIS — L73.9 FOLLICULITIS: Primary | ICD-10-CM

## 2019-07-29 DIAGNOSIS — R73.09 ABNORMAL GLUCOSE: ICD-10-CM

## 2019-07-29 DIAGNOSIS — Z01.818 PREOP EXAMINATION: ICD-10-CM

## 2019-07-29 DIAGNOSIS — D18.00 ANGIOMA: ICD-10-CM

## 2019-07-29 DIAGNOSIS — D22.9 NEVUS: ICD-10-CM

## 2019-07-29 DIAGNOSIS — Z85.828 PERSONAL HISTORY OF SKIN CANCER: ICD-10-CM

## 2019-07-29 DIAGNOSIS — Z11.1 TUBERCULOSIS SCREENING: ICD-10-CM

## 2019-07-29 DIAGNOSIS — D64.9 ANEMIA, UNSPECIFIED TYPE: ICD-10-CM

## 2019-07-29 DIAGNOSIS — L30.9 DERMATITIS: ICD-10-CM

## 2019-07-29 DIAGNOSIS — L82.1 SK (SEBORRHEIC KERATOSIS): ICD-10-CM

## 2019-07-29 DIAGNOSIS — L90.5 SCAR: ICD-10-CM

## 2019-07-29 DIAGNOSIS — Z00.00 ROUTINE GENERAL MEDICAL EXAMINATION AT A HEALTH CARE FACILITY: Primary | ICD-10-CM

## 2019-07-29 DIAGNOSIS — G47.33 OSA ON CPAP: ICD-10-CM

## 2019-07-29 PROCEDURE — 93010 ELECTROCARDIOGRAM REPORT: CPT | Mod: S$GLB,,, | Performed by: INTERNAL MEDICINE

## 2019-07-29 PROCEDURE — 93005 ELECTROCARDIOGRAM TRACING: CPT | Mod: S$GLB,,, | Performed by: FAMILY MEDICINE

## 2019-07-29 PROCEDURE — 99999 PR PBB SHADOW E&M-EST. PATIENT-LVL IV: ICD-10-PCS | Mod: PBBFAC,,, | Performed by: FAMILY MEDICINE

## 2019-07-29 PROCEDURE — 1101F PR PT FALLS ASSESS DOC 0-1 FALLS W/OUT INJ PAST YR: ICD-10-PCS | Mod: CPTII,S$GLB,, | Performed by: DERMATOLOGY

## 2019-07-29 PROCEDURE — 99999 PR PBB SHADOW E&M-EST. PATIENT-LVL IV: CPT | Mod: PBBFAC,,, | Performed by: FAMILY MEDICINE

## 2019-07-29 PROCEDURE — 93005 EKG 12-LEAD: ICD-10-PCS | Mod: S$GLB,,, | Performed by: FAMILY MEDICINE

## 2019-07-29 PROCEDURE — 1101F PT FALLS ASSESS-DOCD LE1/YR: CPT | Mod: CPTII,S$GLB,, | Performed by: DERMATOLOGY

## 2019-07-29 PROCEDURE — 99397 PR PREVENTIVE VISIT,EST,65 & OVER: ICD-10-PCS | Mod: 25,S$GLB,, | Performed by: FAMILY MEDICINE

## 2019-07-29 PROCEDURE — 99214 OFFICE O/P EST MOD 30 MIN: CPT | Mod: S$GLB,,, | Performed by: DERMATOLOGY

## 2019-07-29 PROCEDURE — 99999 PR PBB SHADOW E&M-EST. PATIENT-LVL II: ICD-10-PCS | Mod: PBBFAC,,, | Performed by: DERMATOLOGY

## 2019-07-29 PROCEDURE — 93010 EKG 12-LEAD: ICD-10-PCS | Mod: S$GLB,,, | Performed by: INTERNAL MEDICINE

## 2019-07-29 PROCEDURE — 99397 PER PM REEVAL EST PAT 65+ YR: CPT | Mod: 25,S$GLB,, | Performed by: FAMILY MEDICINE

## 2019-07-29 PROCEDURE — 99214 PR OFFICE/OUTPT VISIT, EST, LEVL IV, 30-39 MIN: ICD-10-PCS | Mod: S$GLB,,, | Performed by: DERMATOLOGY

## 2019-07-29 PROCEDURE — 99999 PR PBB SHADOW E&M-EST. PATIENT-LVL II: CPT | Mod: PBBFAC,,, | Performed by: DERMATOLOGY

## 2019-07-29 RX ORDER — KETOCONAZOLE 20 MG/G
CREAM TOPICAL
Qty: 60 G | Refills: 3 | Status: SHIPPED | OUTPATIENT
Start: 2019-07-29

## 2019-07-29 NOTE — PROGRESS NOTES
Subjective:       Patient ID:  Franko Buchanan is a 69 y.o. male who presents for   Chief Complaint   Patient presents with    Skin Check    Lesion     High risk pt with hx NMSC here to check for the development of new lesions.  C/o lesion left cheek x 1 month.  Denies pain or bleeding.  No tx.  Dark.  Also has recurrent rashes axillae and panus x years.  tx w TAC cream.  Helps.       Review of Systems   Constitutional: Negative for fever, chills, fatigue and malaise.   Skin: Positive for itching, rash and dry skin.   Hematologic/Lymphatic: Does not bruise/bleed easily.        Objective:    Physical Exam   Constitutional: He appears well-developed and well-nourished. No distress.   Neurological: He is alert and oriented to person, place, and time. He is not disoriented.   Psychiatric: He has a normal mood and affect.   Skin:   Areas Examined (abnormalities noted in diagram):   Scalp / Hair Palpated and Inspected  Head / Face Inspection Performed  Neck Inspection Performed  Chest / Axilla Inspection Performed  Abdomen Inspection Performed  Genitals / Buttocks / Groin Inspection Performed  Back Inspection Performed  RUE Inspected  LUE Inspection Performed  RLE Inspected  Nails and Digits Inspection Performed                           Diagram Legend     Erythematous scaling macule/papule c/w actinic keratosis       Vascular papule c/w angioma      Pigmented verrucoid papule/plaque c/w seborrheic keratosis      Yellow umbilicated papule c/w sebaceous hyperplasia      Irregularly shaped tan macule c/w lentigo     1-2 mm smooth white papules consistent with Milia      Movable subcutaneous cyst with punctum c/w epidermal inclusion cyst      Subcutaneous movable cyst c/w pilar cyst      Firm pink to brown papule c/w dermatofibroma      Pedunculated fleshy papule(s) c/w skin tag(s)      Evenly pigmented macule c/w junctional nevus     Mildly variegated pigmented, slightly irregular-bordered macule c/w mildly atypical  nevus      Flesh colored to evenly pigmented papule c/w intradermal nevus       Pink pearly papule/plaque c/w basal cell carcinoma      Erythematous hyperkeratotic cursted plaque c/w SCC      Surgical scar with no sign of skin cancer recurrence      Open and closed comedones      Inflammatory papules and pustules      Verrucoid papule consistent consistent with wart     Erythematous eczematous patches and plaques     Dystrophic onycholytic nail with subungual debris c/w onychomycosis     Umbilicated papule    Erythematous-base heme-crusted tan verrucoid plaque consistent with inflamed seborrheic keratosis     Erythematous Silvery Scaling Plaque c/w Psoriasis     See annotation      Assessment / Plan:        Folliculitis  HIbicleans biw    Dermatitis  -     ketoconazole (NIZORAL) 2 % cream; Apply to the affected areas on under arms twice daily as needed for flare.  Dispense: 60 g; Refill: 3    Lentigo  This is a benign hyperpigmented sun induced lesion. Daily sun protection will reduce the number of new lesions. Treatment of these benign lesions are considered cosmetic.  The nature of sun-induced photo-aging and skin cancers is discussed.  Sun avoidance, protective clothing, and the use of 30-SPF sunscreens is advised. Observe closely for skin damage/changes, and call if such occurs.    Nevus  Discussed ABCDE's of nevi.  Monitor for new mole or moles that are becoming bigger, darker, irritated, or developing irregular borders. Brochure provided.    SK (seborrheic keratosis)  These are benign inherited growths without a malignant potential. Reassurance given to patient. No treatment is necessary.     Angioma  These are benign vascular lesions that are inherited.  Treatment is not necessary.    Personal history of skin cancer  Scar  Pt with history of non melanoma skin cancer  Total body skin examination performed today including at least 12 points as noted in physical examination. No suspicious lesions noted.              Follow up in about 1 year (around 7/29/2020).

## 2019-07-29 NOTE — PROGRESS NOTES
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 Will notice hip pain and radiating leg pain with standing for a while in the OR during surgery.    Surgeon is Dr. Hitchcock    Medical history includes:   sleep apnea, aPap  Rheumatoid arthritis, stable off medications  Fatigue, multifactorial with possible situational depression and anxiety symptoms, was on Cymbalta 60 daily although currently has weaned off and feels well  Atrial fibrillation, paroxysmal, on flecainide as a pill in the pocket treatment, rarely needs this.  On metoprolol as well for rate control.    Prior cardiac testing:  \  Nuclear stress test 2013, negative for ischemia  Echo Doppler 2013 with LVEF of 60-65%, normal diastolic function, moderate LAE, trivial to mild tricuspid regurgitation, mild mitral regurgitation  EKG 11/06/2018:  Normal sinus rhythm    Functional status:  Recently went hiking on vacation was able to hike [place] without any significant shortness of breath or chest pain. No pre-existing coronary artery disease history.     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    Sleep apnea         Past Surgical History:   Procedure Laterality Date    BACK SURGERY      1990's and 2009; last by Naldo    C4-5 and C5-6 ACDF (DISCECTOMY , SPINE , CERVICAL, ANTERIOR APPROACH W/FUSION) Right 11/28/2018    [facility]    COLONOSCOPY N/A 1/30/2018    [facility]O    COLONOSCOPY N/A 12/14/2012    [facility] ([facility])    COSMETIC SURGERY      Focal Laser       Right eye    HERNIA REPAIR Right     inguinal    SKIN CANCER EXCISION      SPINE SURGERY      l3-4/ l5-s1  (x 2)    TONSILLECTOMY         Family History   Problem Relation Age of Onset    Colon polyps Father     Cancer Father         leukemia    Glaucoma Mother     Thyroid disease Sister         hashimoto    Cancer Sister         renal    No Known Problems Sister     Heart failure Maternal Grandfather     Cataracts Maternal Grandmother     Alzheimer's disease Maternal Grandmother     No Known Problems Maternal Aunt     No Known Problems Maternal Uncle     No Known Problems Paternal Aunt     No Known Problems Paternal Uncle     No Known Problems Paternal Grandmother     Cancer Paternal Grandfather         colon    Diabetes Neg Hx     Heart disease Neg Hx     Amblyopia Neg Hx     Blindness Neg Hx     Macular degeneration Neg Hx     Retinal detachment Neg Hx     Strabismus Neg Hx     Hypertension Neg Hx     Stroke Neg Hx        Social History     Tobacco Use    Smoking status: Never Smoker    Smokeless tobacco: Never Used   Substance Use Topics    Alcohol use: Yes     Alcohol/week: 3.0 oz     Types: 6 Standard drinks or equivalent per week     Frequency: 2-4 times a month     Drinks per session: 1 or 2     Binge frequency: Never     Comment: 3 X WEEK     Drug use: No     Lab Results   Component Value Date    WBC 8.49 04/18/2019    HGB 13.9 (L) 04/18/2019    HCT 41.9 04/18/2019    MCV 91 04/18/2019     04/18/2019     CHOL 168 08/01/2018    TRIG 72 08/01/2018    HDL 46 08/01/2018    ALT 21 04/06/2019    AST 28 04/06/2019    BILITOT 0.9 04/06/2019    ALKPHOS 98 04/06/2019     04/06/2019    K 4.1 04/06/2019     04/06/2019    CREATININE 0.9 04/06/2019    CALCIUM 9.5 04/06/2019    ALBUMIN 3.8 04/06/2019    BUN 16 04/06/2019    CO2 25 04/06/2019    TSH 3.204 04/06/2019    PSA 0.49 08/01/2018    INR 1.0 04/18/2019    HGBA1C 5.3 04/18/2019    LDLCALC 107.6 08/01/2018     (H) 04/06/2019           ROS:  GENERAL: No fever, chills, fatigability or weight loss.  SKIN: No rashes, no itching.  HEAD: No headaches.  EYES: No visual changes  EARS: No ear pain or changes in hearing.  NOSE: No congestion or rhinorrhea.  MOUTH & THROAT: No hoarseness, change in voice, or sore throat.  NODES: Denies swollen glands.  CHEST: Denies YAN, cyanosis, wheezing, cough and sputum production.  CARDIOVASCULAR: Denies chest pain, PND, orthopnea.  ABDOMEN: No nausea, vomiting, or changes in bowel function.  URINARY: No flank pain, dysuria or hematuria.  PERIPHERAL VASCULAR: No claudication or cyanosis.  MUSCULOSKELETAL:  Above  NEUROLOGIC: No weakness or numbness.    Vital signs reviewed  PE:   APPEARANCE: Well nourished, well developed, in no acute distress.    HEAD: Normocephalic, atraumatic.  EYES: PERRL. EOMI.   Conjunctivae noninjected.  EARS: TM's intact. Light reflex normal. No retraction or perforation  NOSE: Mucosa pink. Airway clear.  MOUTH & THROAT: No tonsillar enlargement. No pharyngeal erythema or exudate.   NECK: Supple with no cervical lymphadenopathy.  Carotid bruits.  No thyromegaly  CHEST: Good inspiratory effort. Lungs clear to auscultation with no wheezes or crackles.  CARDIOVASCULAR: Normal S1, S2. No rubs, murmurs, or gallops.  ABDOMEN: Bowel sounds normal. Not distended. Soft. No tenderness or masses. No organomegaly.  EXTREMITIES: No edema      IMPRESSION  1. Routine general medical examination at a health care facility     2. Tuberculosis screening    3. Preop examination    4. Abnormal glucose    5. Anemia, unspecified type    6. ALLAN on CPAP    7. Paroxysmal atrial fibrillation            PLAN  Orders Placed This Encounter   Procedures    X-Ray Chest PA And Lateral    QUANTIFERON GOLD TB    Comprehensive metabolic panel    CBC auto differential    Lipid panel    Hemoglobin A1c    APTT    Protime-INR    EKG 12-lead     Overall clinically stable for upcoming surgery, needs preop labs, EKG, chest x-ray above.  Will send note to his surgeon after the above is completed and reviewed.  No concern for any symptomatic coronary artery disease on today's assessment.    EKG personally reviewed demonstrating:  Sinus bradycardia, rate of 50, otherwise normal axis and intervals.  No acute ST or T-wave changes    Needs QuantiFERON gold testing for work purposes        HEALTH SCREENINGS  Immunizations:  Tdap 2014  PCV 10/17/17  PPSV 2018     Age/Gender Appropriate screenings:  Prostate screening : PSA as above 0.49 in 2018.  Defers retesting this year  Colonoscopy 2018.  Internal hemorrhoids, otherwise normal.  Return in 5 years

## 2019-07-30 ENCOUNTER — PATIENT MESSAGE (OUTPATIENT)
Dept: FAMILY MEDICINE | Facility: CLINIC | Age: 69
End: 2019-07-30

## 2019-07-30 ENCOUNTER — HOSPITAL ENCOUNTER (OUTPATIENT)
Dept: RADIOLOGY | Facility: HOSPITAL | Age: 69
Discharge: HOME | End: 2019-07-30
Attending: FAMILY MEDICINE
Payer: COMMERCIAL

## 2019-07-30 DIAGNOSIS — D64.9 ANEMIA, UNSPECIFIED TYPE: ICD-10-CM

## 2019-07-30 DIAGNOSIS — G47.33 OSA ON CPAP: ICD-10-CM

## 2019-07-30 DIAGNOSIS — Z00.00 ROUTINE GENERAL MEDICAL EXAMINATION AT A HEALTH CARE FACILITY: ICD-10-CM

## 2019-07-30 DIAGNOSIS — I48.0 PAROXYSMAL ATRIAL FIBRILLATION: ICD-10-CM

## 2019-07-30 DIAGNOSIS — R73.09 ABNORMAL GLUCOSE: ICD-10-CM

## 2019-07-30 DIAGNOSIS — Z01.818 PREOP EXAMINATION: ICD-10-CM

## 2019-07-30 DIAGNOSIS — Z11.1 TUBERCULOSIS SCREENING: ICD-10-CM

## 2019-07-30 PROCEDURE — 71046 XR CHEST PA AND LATERAL: ICD-10-PCS | Mod: 26,,, | Performed by: RADIOLOGY

## 2019-07-30 PROCEDURE — 71046 X-RAY EXAM CHEST 2 VIEWS: CPT | Mod: 26,,, | Performed by: RADIOLOGY

## 2019-07-30 PROCEDURE — 71046 X-RAY EXAM CHEST 2 VIEWS: CPT | Mod: TC,FY

## 2019-08-02 ENCOUNTER — HOSPITAL ENCOUNTER (OUTPATIENT)
Dept: PREADMISSION TESTING | Facility: OTHER | Age: 69
Discharge: HOME | End: 2019-08-02
Attending: NEUROLOGICAL SURGERY
Payer: COMMERCIAL

## 2019-08-02 ENCOUNTER — TELEPHONE (OUTPATIENT)
Dept: SPINE | Facility: CLINIC | Age: 69
End: 2019-08-02

## 2019-08-02 ENCOUNTER — ANESTHESIA EVENT (OUTPATIENT)
Dept: SURGERY | Facility: HOSPITAL | Age: 69
DRG: 460 | End: 2019-08-02
Payer: COMMERCIAL

## 2019-08-02 VITALS
HEART RATE: 68 BPM | OXYGEN SATURATION: 96 % | SYSTOLIC BLOOD PRESSURE: 135 MMHG | TEMPERATURE: 98 F | WEIGHT: 222 LBS | BODY MASS INDEX: 29.42 KG/M2 | DIASTOLIC BLOOD PRESSURE: 80 MMHG | HEIGHT: 73 IN

## 2019-08-02 DIAGNOSIS — M43.20 FUSION OF SPINE, UNSPECIFIED SPINAL REGION: Primary | ICD-10-CM

## 2019-08-02 RX ORDER — PREGABALIN 75 MG/1
75 CAPSULE ORAL
Status: CANCELLED | OUTPATIENT
Start: 2019-08-02 | End: 2019-08-02

## 2019-08-02 RX ORDER — CELECOXIB 200 MG/1
400 CAPSULE ORAL
Status: CANCELLED | OUTPATIENT
Start: 2019-08-02 | End: 2019-08-02

## 2019-08-02 RX ORDER — ACETAMINOPHEN 500 MG
1000 TABLET ORAL
Status: CANCELLED | OUTPATIENT
Start: 2019-08-02 | End: 2019-08-02

## 2019-08-02 RX ORDER — LIDOCAINE HYDROCHLORIDE 10 MG/ML
0.5 INJECTION, SOLUTION EPIDURAL; INFILTRATION; INTRACAUDAL; PERINEURAL ONCE
Status: CANCELLED | OUTPATIENT
Start: 2019-08-02 | End: 2019-08-02

## 2019-08-02 RX ORDER — MIDAZOLAM HYDROCHLORIDE 1 MG/ML
2 INJECTION INTRAMUSCULAR; INTRAVENOUS
Status: CANCELLED | OUTPATIENT
Start: 2019-08-02 | End: 2019-08-02

## 2019-08-02 RX ORDER — ASPIRIN 325 MG
325 TABLET ORAL DAILY
COMMUNITY

## 2019-08-02 RX ORDER — SODIUM CHLORIDE, SODIUM LACTATE, POTASSIUM CHLORIDE, CALCIUM CHLORIDE 600; 310; 30; 20 MG/100ML; MG/100ML; MG/100ML; MG/100ML
INJECTION, SOLUTION INTRAVENOUS CONTINUOUS
Status: CANCELLED | OUTPATIENT
Start: 2019-08-02

## 2019-08-02 NOTE — DISCHARGE INSTRUCTIONS
PRE-ADMIT TESTING -  [phone]    [address]  [place] [facility]          Your surgery has been scheduled at [facility]. We are pleased to have the opportunity to serve you. For Further Information please call [phone].    On the day of surgery please report to the Information Desk on the 1st floor.    · CONTACT YOUR PHYSICIAN'S OFFICE THE DAY PRIOR TO YOUR SURGERY TO OBTAIN YOUR ARRIVAL TIME.     · The evening before surgery do not eat anything after 9 p.m. ( this includes hard candy, chewing gum and mints).  You may only have GATORADE, POWERADE AND WATER  from 9 p.m. until you leave your home.   DO NOT DRINK ANY LIQUIDS ON THE WAY TO THE HOSPITAL.      SPECIAL MEDICATION INSTRUCTIONS: TAKE medications checked off by the Anesthesiologist on your Medication List.    Angiogram Patients: Take medications as instructed by your physician, including aspirin.     Surgery Patients:    If you take ASPIRIN - Your PHYSICIAN/SURGEON will need to inform you IF/OR when you need to stop taking aspirin prior to your surgery.     Do Not take any medications containing IBUPROFEN.  Do Not Wear any make-up or dark nail polish   (especially eye make-up) to surgery. If you come to surgery with makeup on you will be required to remove the makeup or nail polish.    Do not shave your surgical area at least 5 days prior to your surgery. The surgical prep will be performed at the hospital according to Infection Control regulations.    Leave all valuables at home.   Do Not wear any jewelry or watches, including any metal in body piercings. Jewelry must be removed prior to coming to the hospital.  There is a possibility that rings that are unable to be removed may be cut off if they are on the surgical extremity.    Contact Lens must be removed before surgery. Either do not wear the contact lens or bring a case and solution for storage.  Please bring a container for eyeglasses or dentures as required.  Bring  any paperwork your physician has provided, such as consent forms,  history and physicals, doctor's orders, etc.   Bring comfortable clothes that are loose fitting to wear upon discharge. Take into consideration the type of surgery being performed.  Maintain your diet as advised per your physician the day prior to surgery.      Adequate rest the night before surgery is advised.   Park in the Parking lot behind the hospital or in the [facility] across the street from the parking lot. Parking is complimentary.  If you will be discharged the same day as your procedure, please arrange for a responsible adult to drive you home or to accompany you if traveling by taxi.   YOU WILL NOT BE PERMITTED TO DRIVE OR TO LEAVE THE HOSPITAL ALONE AFTER SURGERY.   It is strongly recommended that you arrange for someone to remain with you for the first 24 hrs following your surgery.    The Surgeon will speak to your family/visitor after your surgery regarding the outcome of your surgery and post op care.  The Surgeon may speak to you after your surgery, but there is a possibility you may not remember the details.  Please check with your family members regarding the conversation with the Surgeon.      Thank you for your cooperation.  The Staff of [facility].                Bathing Instructions with Hibiclens     Shower the evening before and morning of your procedure with Hibiclens:   Wash your face with water and your regular face wash/soap   Apply Hibiclens directly on your skin or on a wet washcloth and wash gently. When showering: Move away from the shower stream when applying Hibiclens to avoid rinsing off too soon.   Rinse thoroughly with warm water   Do not dilute Hibiclens         Dry off as usual, do not use any deodorant, powder, body lotions, perfume, after shave or cologne.

## 2019-08-02 NOTE — ANESTHESIA PREPROCEDURE EVALUATION
08/02/2019  Franko Buchanan is a 69 y.o., male.    Anesthesia Evaluation    I have reviewed the Patient Summary Reports.    I have reviewed the Nursing Notes.   I have reviewed the Medications.     Review of Systems  Anesthesia Hx:  No problems with previous Anesthesia Denies Hx of Anesthetic complications  History of prior surgery of interest to airway management or planning: Previous anesthesia: General ACF 1 yr ago with general anesthesia.  Procedure performed at an [facility] Facility. Denies Family Hx of Anesthesia complications.   Denies Personal Hx of Anesthesia complications.   Social:  Non-Smoker, Social Alcohol Use    Hematology/Oncology:  Hematology Normal   Oncology Normal     EENT/Dental:EENT/Dental Normal   Cardiovascular:   Exercise tolerance: good Dysrhythmias atrial fibrillation    Pulmonary:   Sleep Apnea, CPAP    Renal/:  Renal/ Normal     Hepatic/GI:  Hepatic/GI Normal    Musculoskeletal:   Arthritis     Neurological:   Neuromuscular Disease, (cervical radiculopathy)    Endocrine:  Endocrine Normal    Dermatological:  Skin Normal    Psych:  Psychiatric Normal           Physical Exam  General:  Well nourished    Airway/Jaw/Neck:  Airway Findings: Mouth Opening: Normal Tongue: Normal  Mallampati: II  Jaw/Neck Findings:  Neck ROM: Extension Decreased, Mod.      Dental:  Dental Findings: Molar caps        Mental Status:  Mental Status Findings:  Cooperative, Alert and Oriented         Anesthesia Plan  Type of Anesthesia, risks & benefits discussed:  Anesthesia Type:  general  Patient's Preference:   Intra-op Monitoring Plan: standard ASA monitors  Intra-op Monitoring Plan Comments:   Post Op Pain Control Plan: multimodal analgesia and per primary service following discharge from PACU  Post Op Pain Control Plan Comments:   Induction:   IV  Beta Blocker:  Patient is not currently on a  Beta-Blocker (No further documentation required).       Informed Consent: Patient understands risks and agrees with Anesthesia plan.  Questions answered. Anesthesia consent signed with patient.  ASA Score: 2     Day of Surgery Review of History & Physical:    H&P update referred to the surgeon.     Anesthesia Plan Notes: [facility] Gyn from [facility],labs ok in [facility]        Ready For Surgery From Anesthesia Perspective.

## 2019-08-05 ENCOUNTER — PATIENT MESSAGE (OUTPATIENT)
Dept: SPINE | Facility: CLINIC | Age: 69
End: 2019-08-05

## 2019-08-05 ENCOUNTER — TELEPHONE (OUTPATIENT)
Dept: SPINE | Facility: CLINIC | Age: 69
End: 2019-08-05

## 2019-08-05 DIAGNOSIS — M43.22 CERVICAL VERTEBRAL FUSION: Primary | ICD-10-CM

## 2019-08-05 NOTE — TELEPHONE ENCOUNTER
Spoke to patient advising of new location for surgery. Advised patient he is to arrive to main campus on [facility] to 2nd floor DOS for 6 am. Informed patient he is to have nothing by mouth after midnight. Patient verbalized and expressed understanding.

## 2019-08-06 ENCOUNTER — TELEPHONE (OUTPATIENT)
Dept: SPINE | Facility: CLINIC | Age: 69
End: 2019-08-06

## 2019-08-06 PROBLEM — M48.00 SPINAL STENOSIS: Status: ACTIVE | Noted: 2019-08-06

## 2019-08-06 NOTE — SUBJECTIVE & OBJECTIVE
No medications prior to admission.       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    Sleep apnea      Past Surgical History:   Procedure Laterality Date    BACK SURGERY      1990's and 2009; last by Naldo    C4-5 and C5-6 ACDF (DISCECTOMY , SPINE , CERVICAL, ANTERIOR APPROACH W/FUSION) Right 11/28/2018    [facility] OR    COLONOSCOPY N/A 1/30/2018    [facility] ENDO    COLONOSCOPY N/A 12/14/2012    Performed by [facility] at [facility] ENDO (4TH FLR)    COSMETIC SURGERY      Focal Laser       Right eye    HERNIA REPAIR Right     inguinal    SKIN CANCER EXCISION      SPINE SURGERY      l3-4/ l5-s1  (x 2)    TONSILLECTOMY       Family History     Problem Relation (Age of Onset)    Alzheimer's disease Maternal Grandmother    Cancer Father, Sister, Paternal Grandfather    Cataracts Maternal Grandmother    Colon polyps Father    Glaucoma Mother    Heart failure Maternal Grandfather    No Known Problems Sister, Maternal Aunt, Maternal Uncle, Paternal Aunt, Paternal Uncle, Paternal Grandmother    Thyroid disease Sister        Tobacco Use    Smoking status: Never Smoker    Smokeless tobacco: Never Used   Substance and Sexual Activity    Alcohol use: Yes     Alcohol/week: 3.0 oz     Types: 6 Standard drinks or equivalent per week     Frequency: 2-4 times a month     Drinks per session: 1 or 2     Binge frequency: Never     Comment: 3 X WEEK     Drug use: No    Sexual activity: Not on file     Review of Systems   Constitutional: Negative for  activity change, chills, diaphoresis, fatigue and fever.   HENT: Negative for tinnitus, trouble swallowing and voice change.    Eyes: Negative for photophobia and visual disturbance.   Respiratory: Negative for cough, choking, chest tightness, shortness of breath, wheezing and stridor.    Cardiovascular: Negative for chest pain, palpitations and leg swelling.   Gastrointestinal: Negative for abdominal distention, abdominal pain, constipation, diarrhea, nausea and vomiting.   Endocrine: Negative for polydipsia, polyphagia and polyuria.   Genitourinary: Negative for dysuria, frequency, hematuria and urgency.   Musculoskeletal: Positive for back pain and gait problem. Negative for neck pain and neck stiffness.   Skin: Negative for color change, pallor, rash and wound.   Neurological: Negative for dizziness, tremors, seizures, syncope, facial asymmetry, speech difficulty, weakness, light-headedness, numbness and headaches.   Hematological: Negative for adenopathy. Does not bruise/bleed easily.   Psychiatric/Behavioral: Negative for agitation, behavioral problems and confusion.     Objective:     Weight: 100.7 kg (222 lb)  Body mass index is 29.29 kg/m².  Vital Signs (Most Recent):    Vital Signs (24h Range):                    Neurosurgery Physical Exam  General: AOx3, GCS E4V5M6  CNII-XII: Intact on fine exam, PERRL, visual fields grossly intact, EOMi, facial sensation preserved, no facial assymetry, tongue/uvula/palate midline, shoulder shrug equal, no pronator drift  Extremities: 5/5 motor throughout, sensorium intact throughout, coordination intact throughout, DTRs 2+, no pathological reflexes, no sensory level present    Significant Labs:  No results for input(s): GLU, NA, K, CL, CO2, BUN, CREATININE, CALCIUM, MG in the last 48 hours.  No results for input(s): WBC, HGB, HCT, PLT in the last 48 hours.  No results for input(s): LABPT, INR, APTT in the last 48 hours.  Microbiology Results (last 7 days)     ** No  results found for the last 168 hours. **        ABGs: No results for input(s): PH, PCO2, PO2, HCO3, POCSATURATED, BE in the last 48 hours.  Cardiac markers: No results for input(s): CKMB, CPKMB, TROPONINT, TROPONINI, MYOGLOBIN in the last 48 hours.  CMP: No results for input(s): GLU, CALCIUM, ALBUMIN, PROT, NA, K, CO2, CL, BUN, CREATININE, ALKPHOS, ALT, AST, BILITOT in the last 48 hours.  CRP: No results for input(s): CRP in the last 48 hours.  ESR: No results for input(s): POCESR, ERYTHROCYTES in the last 48 hours.  LFTs: No results for input(s): ALT, AST, ALKPHOS, BILITOT, PROT, ALBUMIN in the last 48 hours.  Procalcitonin: No results for input(s): PROCAL in the last 48 hours.    Significant Diagnostics:  I have reviewed all pertinent imaging results/findings within the past 24 hours.

## 2019-08-06 NOTE — TELEPHONE ENCOUNTER
"Requesting to speak with YAIMA Gooden to answer a few questions since she will "be in surgery with Dr. Hitchcock". Advised Florence will not be in surgery, but RN can answer questions. Clarified he is the first case, with a 2-3 day stay anticipated. Asking why the surgery was moved from [facility] to [facility]. RNOC advised, it is a larger case and Dr. Hitchcock prefer it be completed at [facility]. Questioning the the concept that it is a large surgery as she thought it was "MIS". Advised "MIS" only speaks to the surgical approach. Lastly, advised he will be typed and cross matched in the morning if warranted. Verbalized understanding and thanked RNOC for calling. Encouraged to call back if there are any other questions.  "

## 2019-08-06 NOTE — TELEPHONE ENCOUNTER
Patient called.  Had multiple questions.  He wanted to know why the location changed from [facility] to [facility]?  Wanted to confirm the type if surgery that he is having and wanted to know if he is the first case.  He would like to know how many days he will be in the hospital?  ( He did say that he would like to go home as soon as possible.)  Lastly he would like to know when he will be typed and cross screened as to each hospital does it differently.

## 2019-08-06 NOTE — ASSESSMENT & PLAN NOTE
69M w/ multilevel lumbar spondylosis w/ spinal stenosis who presents for  L3-4, L4-5 XLIF, L5-S1 TLIF, L4-5 laminectomy, and L3-S1 posterior instrumentation:    --Patient evaluated prior to surgery  --All diagnostics and imaging reviewed  --Patient NPO since MN  --No anti-coag/plt medication in the last 72h  --Patient consented and all questions answered  --Further reccs to follow s/p surgery

## 2019-08-06 NOTE — HPI
69M w/ multilevel lumbar spondylosis w/ spinal stenosis who presents for  L3-4, L4-5 XLIF, L5-S1 TLIF, L4-5 laminectomy, and L3-S1 posterior instrumentation. Patient complains of pain in the right hip and leg has been about two years and has gotten worse in the last 3-4 months.  Pain primarily in the right hip and some in the left hip.  Pain radiates to the right hip and buttock region with radiation down the right anterior and lateral leg to the top of the right foot.  Occasional back pain. Constant right hip pain and the pain in the right leg comes and goes.  Pain worse with standing and walking and better with laying down.  No left leg pain. Patient takes advil and occasional percocet. He was not able to get the caudal FAUZIA due to social reasons. Patient denies any recent accidents or trauma, no saddle anesthesias, and no bowel or bladder incontinence.

## 2019-08-07 ENCOUNTER — HOSPITAL ENCOUNTER (INPATIENT)
Facility: HOSPITAL | Age: 69
LOS: 2 days | Discharge: HOME HEALTH | DRG: 460 | End: 2019-08-09
Attending: NEUROLOGICAL SURGERY | Admitting: NEUROLOGICAL SURGERY
Payer: COMMERCIAL

## 2019-08-07 ENCOUNTER — ANESTHESIA (OUTPATIENT)
Dept: SURGERY | Facility: HOSPITAL | Age: 69
DRG: 460 | End: 2019-08-07
Payer: COMMERCIAL

## 2019-08-07 DIAGNOSIS — M47.22 OSTEOARTHRITIS OF SPINE WITH RADICULOPATHY, CERVICAL REGION: ICD-10-CM

## 2019-08-07 DIAGNOSIS — Z01.818 PREOP TESTING: ICD-10-CM

## 2019-08-07 DIAGNOSIS — M48.062 SPINAL STENOSIS OF LUMBAR REGION WITH NEUROGENIC CLAUDICATION: ICD-10-CM

## 2019-08-07 DIAGNOSIS — M48.061 SPINAL STENOSIS OF LUMBAR REGION: Primary | ICD-10-CM

## 2019-08-07 LAB
ABO + RH BLD: NORMAL
BASOPHILS # BLD AUTO: 0.01 K/UL (ref 0–0.2)
BASOPHILS NFR BLD: 0.1 % (ref 0–1.9)
BLD GP AB SCN CELLS X3 SERPL QL: NORMAL
DIFFERENTIAL METHOD: ABNORMAL
EOSINOPHIL # BLD AUTO: 0 K/UL (ref 0–0.5)
EOSINOPHIL NFR BLD: 0 % (ref 0–8)
ERYTHROCYTE [DISTWIDTH] IN BLOOD BY AUTOMATED COUNT: 13.3 % (ref 11.5–14.5)
HCT VFR BLD AUTO: 39.1 % (ref 40–54)
HGB BLD-MCNC: 12.7 G/DL (ref 14–18)
IMM GRANULOCYTES # BLD AUTO: 0.09 K/UL (ref 0–0.04)
IMM GRANULOCYTES NFR BLD AUTO: 0.7 % (ref 0–0.5)
LYMPHOCYTES # BLD AUTO: 0.4 K/UL (ref 1–4.8)
LYMPHOCYTES NFR BLD: 3.1 % (ref 18–48)
MCH RBC QN AUTO: 30.3 PG (ref 27–31)
MCHC RBC AUTO-ENTMCNC: 32.5 G/DL (ref 32–36)
MCV RBC AUTO: 93 FL (ref 82–98)
MONOCYTES # BLD AUTO: 0.1 K/UL (ref 0.3–1)
MONOCYTES NFR BLD: 1 % (ref 4–15)
NEUTROPHILS # BLD AUTO: 12.5 K/UL (ref 1.8–7.7)
NEUTROPHILS NFR BLD: 95.1 % (ref 38–73)
NRBC BLD-RTO: 0 /100 WBC
PLATELET # BLD AUTO: 161 K/UL (ref 150–350)
PMV BLD AUTO: 10.3 FL (ref 9.2–12.9)
RBC # BLD AUTO: 4.19 M/UL (ref 4.6–6.2)
WBC # BLD AUTO: 13.11 K/UL (ref 3.9–12.7)

## 2019-08-07 PROCEDURE — 20939 BONE MARROW ASPIR BONE GRFG: CPT | Mod: LT,,, | Performed by: NEUROLOGICAL SURGERY

## 2019-08-07 PROCEDURE — 36000710: Performed by: NEUROLOGICAL SURGERY

## 2019-08-07 PROCEDURE — 22585 ARTHRD ANT NTRBD MIN DSC EA: CPT | Mod: ,,, | Performed by: NEUROLOGICAL SURGERY

## 2019-08-07 PROCEDURE — 22842 INSERT SPINE FIXATION DEVICE: CPT | Mod: ,,, | Performed by: NEUROLOGICAL SURGERY

## 2019-08-07 PROCEDURE — 37000008 HC ANESTHESIA 1ST 15 MINUTES: Performed by: NEUROLOGICAL SURGERY

## 2019-08-07 PROCEDURE — 25000003 PHARM REV CODE 250: Performed by: ANESTHESIOLOGY

## 2019-08-07 PROCEDURE — 36620 ARTERIAL: ICD-10-PCS | Mod: 59,,, | Performed by: ANESTHESIOLOGY

## 2019-08-07 PROCEDURE — D9220A PRA ANESTHESIA: Mod: ANES,,, | Performed by: ANESTHESIOLOGY

## 2019-08-07 PROCEDURE — 25000003 PHARM REV CODE 250: Performed by: NURSE ANESTHETIST, CERTIFIED REGISTERED

## 2019-08-07 PROCEDURE — 22558 ARTHRD ANT NTRBD MIN DSC LUM: CPT | Mod: 59,,, | Performed by: NEUROLOGICAL SURGERY

## 2019-08-07 PROCEDURE — 36620 INSERTION CATHETER ARTERY: CPT | Mod: 59,,, | Performed by: ANESTHESIOLOGY

## 2019-08-07 PROCEDURE — 71000033 HC RECOVERY, INTIAL HOUR: Performed by: NEUROLOGICAL SURGERY

## 2019-08-07 PROCEDURE — 63600175 PHARM REV CODE 636 W HCPCS: Performed by: STUDENT IN AN ORGANIZED HEALTH CARE EDUCATION/TRAINING PROGRAM

## 2019-08-07 PROCEDURE — D9220A PRA ANESTHESIA: ICD-10-PCS | Mod: CRNA,,, | Performed by: NURSE ANESTHETIST, CERTIFIED REGISTERED

## 2019-08-07 PROCEDURE — 63047 LAM FACETEC & FORAMOT LUMBAR: CPT | Mod: 59,,, | Performed by: NEUROLOGICAL SURGERY

## 2019-08-07 PROCEDURE — D9220A PRA ANESTHESIA: Mod: CRNA,,, | Performed by: NURSE ANESTHETIST, CERTIFIED REGISTERED

## 2019-08-07 PROCEDURE — 22558 PR ARTHRODESIS ANT INTERBODY MIN DISCECTOMY,LUMBAR: ICD-10-PCS | Mod: 59,,, | Performed by: NEUROLOGICAL SURGERY

## 2019-08-07 PROCEDURE — 85025 COMPLETE CBC W/AUTO DIFF WBC: CPT

## 2019-08-07 PROCEDURE — 37000009 HC ANESTHESIA EA ADD 15 MINS: Performed by: NEUROLOGICAL SURGERY

## 2019-08-07 PROCEDURE — 20939 PR BONE MARROW ASPIR, BONE GRAFTING, SPINE SURG ONLY: ICD-10-PCS | Mod: LT,,, | Performed by: NEUROLOGICAL SURGERY

## 2019-08-07 PROCEDURE — 22630 ARTHRD PST TQ 1NTRSPC LUM: CPT | Mod: ,,, | Performed by: NEUROLOGICAL SURGERY

## 2019-08-07 PROCEDURE — 20936 SP BONE AGRFT LOCAL ADD-ON: CPT | Mod: ,,, | Performed by: NEUROLOGICAL SURGERY

## 2019-08-07 PROCEDURE — 27201423 OPTIME MED/SURG SUP & DEVICES STERILE SUPPLY: Performed by: NEUROLOGICAL SURGERY

## 2019-08-07 PROCEDURE — 63600175 PHARM REV CODE 636 W HCPCS: Performed by: ANESTHESIOLOGY

## 2019-08-07 PROCEDURE — D9220A PRA ANESTHESIA: ICD-10-PCS | Mod: ANES,,, | Performed by: ANESTHESIOLOGY

## 2019-08-07 PROCEDURE — 22853 INSJ BIOMECHANICAL DEVICE: CPT | Mod: ,,, | Performed by: NEUROLOGICAL SURGERY

## 2019-08-07 PROCEDURE — 63600175 PHARM REV CODE 636 W HCPCS: Performed by: NURSE ANESTHETIST, CERTIFIED REGISTERED

## 2019-08-07 PROCEDURE — 25000003 PHARM REV CODE 250: Performed by: STUDENT IN AN ORGANIZED HEALTH CARE EDUCATION/TRAINING PROGRAM

## 2019-08-07 PROCEDURE — C1769 GUIDE WIRE: HCPCS | Performed by: NEUROLOGICAL SURGERY

## 2019-08-07 PROCEDURE — 27800903 OPTIME MED/SURG SUP & DEVICES OTHER IMPLANTS: Performed by: NEUROLOGICAL SURGERY

## 2019-08-07 PROCEDURE — 20930 SP BONE ALGRFT MORSEL ADD-ON: CPT | Mod: ,,, | Performed by: NEUROLOGICAL SURGERY

## 2019-08-07 PROCEDURE — 22842 PR POSTERIOR SEGMENTAL INSTRUMENTATION 3-6 VRT SEG: ICD-10-PCS | Mod: ,,, | Performed by: NEUROLOGICAL SURGERY

## 2019-08-07 PROCEDURE — 22630 PR ARTHRODESIS POSTERIOR INTERBODY LUMBAR: ICD-10-PCS | Mod: ,,, | Performed by: NEUROLOGICAL SURGERY

## 2019-08-07 PROCEDURE — C1713 ANCHOR/SCREW BN/BN,TIS/BN: HCPCS | Performed by: NEUROLOGICAL SURGERY

## 2019-08-07 PROCEDURE — 20936 PR AUTOGRAFT SPINE SURGERY LOCAL FROM SAME INCISION: ICD-10-PCS | Mod: ,,, | Performed by: NEUROLOGICAL SURGERY

## 2019-08-07 PROCEDURE — 86850 RBC ANTIBODY SCREEN: CPT

## 2019-08-07 PROCEDURE — 22585 PR ARTHRODESIS ANT INTERBODY MIN DISCECTOMY,EA ADDL: ICD-10-PCS | Mod: ,,, | Performed by: NEUROLOGICAL SURGERY

## 2019-08-07 PROCEDURE — 63600175 PHARM REV CODE 636 W HCPCS: Performed by: NEUROLOGICAL SURGERY

## 2019-08-07 PROCEDURE — 25000003 PHARM REV CODE 250: Performed by: NEUROLOGICAL SURGERY

## 2019-08-07 PROCEDURE — 71000039 HC RECOVERY, EACH ADD'L HOUR: Performed by: NEUROLOGICAL SURGERY

## 2019-08-07 PROCEDURE — 36000711: Performed by: NEUROLOGICAL SURGERY

## 2019-08-07 PROCEDURE — 22853 PR INSERT BIOMECH DEV W/INTERBODY ARTHRODESIS, EA CONTIGUOUS DEFECT: ICD-10-PCS | Mod: ,,, | Performed by: NEUROLOGICAL SURGERY

## 2019-08-07 PROCEDURE — 20930 PR ALLOGRAFT FOR SPINE SURGERY ONLY MORSELIZED: ICD-10-PCS | Mod: ,,, | Performed by: NEUROLOGICAL SURGERY

## 2019-08-07 PROCEDURE — 63047 PR LAMINEC/FACETECT/FORAMIN,LUMBAR 1 SEG: ICD-10-PCS | Mod: 59,,, | Performed by: NEUROLOGICAL SURGERY

## 2019-08-07 DEVICE — IMPLANTABLE DEVICE: Type: IMPLANTABLE DEVICE | Site: SPINE LUMBAR | Status: FUNCTIONAL

## 2019-08-07 DEVICE — CAP LOCKING CREO MIS: Type: IMPLANTABLE DEVICE | Site: SPINE LUMBAR | Status: FUNCTIONAL

## 2019-08-07 DEVICE — SCREW CREO CANN MOD 6.5X45MM: Type: IMPLANTABLE DEVICE | Site: SPINE LUMBAR | Status: FUNCTIONAL

## 2019-08-07 DEVICE — TULIP CREO MIS MOD POLY 30MM: Type: IMPLANTABLE DEVICE | Site: SPINE LUMBAR | Status: FUNCTIONAL

## 2019-08-07 RX ORDER — GABAPENTIN 300 MG/1
300 CAPSULE ORAL 3 TIMES DAILY
Status: DISCONTINUED | OUTPATIENT
Start: 2019-08-07 | End: 2019-08-08

## 2019-08-07 RX ORDER — LIDOCAINE HYDROCHLORIDE 10 MG/ML
0.5 INJECTION, SOLUTION EPIDURAL; INFILTRATION; INTRACAUDAL; PERINEURAL ONCE
Status: COMPLETED | OUTPATIENT
Start: 2019-08-07 | End: 2019-08-07

## 2019-08-07 RX ORDER — DIAZEPAM 5 MG/1
5 TABLET ORAL EVERY 6 HOURS PRN
Status: DISCONTINUED | OUTPATIENT
Start: 2019-08-07 | End: 2019-08-09 | Stop reason: HOSPADM

## 2019-08-07 RX ORDER — MUPIROCIN 20 MG/G
1 OINTMENT TOPICAL 2 TIMES DAILY
Status: DISCONTINUED | OUTPATIENT
Start: 2019-08-07 | End: 2019-08-09 | Stop reason: HOSPADM

## 2019-08-07 RX ORDER — SODIUM CHLORIDE 9 MG/ML
INJECTION, SOLUTION INTRAVENOUS CONTINUOUS PRN
Status: DISCONTINUED | OUTPATIENT
Start: 2019-08-07 | End: 2019-08-07

## 2019-08-07 RX ORDER — LIDOCAINE HCL/PF 100 MG/5ML
SYRINGE (ML) INTRAVENOUS
Status: DISCONTINUED | OUTPATIENT
Start: 2019-08-07 | End: 2019-08-07

## 2019-08-07 RX ORDER — CELECOXIB 200 MG/1
400 CAPSULE ORAL
Status: COMPLETED | OUTPATIENT
Start: 2019-08-07 | End: 2019-08-07

## 2019-08-07 RX ORDER — HYDROMORPHONE HYDROCHLORIDE 1 MG/ML
0.2 INJECTION, SOLUTION INTRAMUSCULAR; INTRAVENOUS; SUBCUTANEOUS EVERY 5 MIN PRN
Status: COMPLETED | OUTPATIENT
Start: 2019-08-07 | End: 2019-08-07

## 2019-08-07 RX ORDER — ACETAMINOPHEN 10 MG/ML
INJECTION, SOLUTION INTRAVENOUS
Status: DISCONTINUED | OUTPATIENT
Start: 2019-08-07 | End: 2019-08-07

## 2019-08-07 RX ORDER — BACITRACIN 50000 [IU]/1
INJECTION, POWDER, FOR SOLUTION INTRAMUSCULAR
Status: DISCONTINUED | OUTPATIENT
Start: 2019-08-07 | End: 2019-08-07 | Stop reason: HOSPADM

## 2019-08-07 RX ORDER — ACETAMINOPHEN 325 MG/1
650 TABLET ORAL EVERY 6 HOURS PRN
Status: DISCONTINUED | OUTPATIENT
Start: 2019-08-07 | End: 2019-08-09 | Stop reason: HOSPADM

## 2019-08-07 RX ORDER — MUPIROCIN 20 MG/G
OINTMENT TOPICAL
Status: ACTIVE | OUTPATIENT
Start: 2019-08-07

## 2019-08-07 RX ORDER — BISACODYL 10 MG
10 SUPPOSITORY, RECTAL RECTAL DAILY
Status: DISCONTINUED | OUTPATIENT
Start: 2019-08-08 | End: 2019-08-09 | Stop reason: HOSPADM

## 2019-08-07 RX ORDER — AMOXICILLIN 250 MG
2 CAPSULE ORAL NIGHTLY PRN
Status: DISCONTINUED | OUTPATIENT
Start: 2019-08-07 | End: 2019-08-09 | Stop reason: HOSPADM

## 2019-08-07 RX ORDER — DEXAMETHASONE SODIUM PHOSPHATE 4 MG/ML
INJECTION, SOLUTION INTRA-ARTICULAR; INTRALESIONAL; INTRAMUSCULAR; INTRAVENOUS; SOFT TISSUE
Status: DISCONTINUED | OUTPATIENT
Start: 2019-08-07 | End: 2019-08-07

## 2019-08-07 RX ORDER — SODIUM CHLORIDE 9 MG/ML
INJECTION, SOLUTION INTRAVENOUS CONTINUOUS
Status: DISCONTINUED | OUTPATIENT
Start: 2019-08-07 | End: 2019-08-09 | Stop reason: HOSPADM

## 2019-08-07 RX ORDER — ROCURONIUM BROMIDE 10 MG/ML
INJECTION, SOLUTION INTRAVENOUS
Status: DISCONTINUED | OUTPATIENT
Start: 2019-08-07 | End: 2019-08-07

## 2019-08-07 RX ORDER — FLECAINIDE ACETATE 100 MG/1
300 TABLET ORAL DAILY
Status: DISCONTINUED | OUTPATIENT
Start: 2019-08-08 | End: 2019-08-09 | Stop reason: HOSPADM

## 2019-08-07 RX ORDER — ONDANSETRON 2 MG/ML
INJECTION INTRAMUSCULAR; INTRAVENOUS
Status: DISCONTINUED | OUTPATIENT
Start: 2019-08-07 | End: 2019-08-07

## 2019-08-07 RX ORDER — DOCUSATE SODIUM 50 MG/5ML
100 LIQUID ORAL DAILY
Status: DISCONTINUED | OUTPATIENT
Start: 2019-08-08 | End: 2019-08-09 | Stop reason: HOSPADM

## 2019-08-07 RX ORDER — PROPOFOL 10 MG/ML
VIAL (ML) INTRAVENOUS
Status: DISCONTINUED | OUTPATIENT
Start: 2019-08-07 | End: 2019-08-07

## 2019-08-07 RX ORDER — VANCOMYCIN HYDROCHLORIDE 1 G/20ML
INJECTION, POWDER, LYOPHILIZED, FOR SOLUTION INTRAVENOUS
Status: DISCONTINUED | OUTPATIENT
Start: 2019-08-07 | End: 2019-08-07 | Stop reason: HOSPADM

## 2019-08-07 RX ORDER — MIDAZOLAM HYDROCHLORIDE 1 MG/ML
INJECTION, SOLUTION INTRAMUSCULAR; INTRAVENOUS
Status: DISCONTINUED | OUTPATIENT
Start: 2019-08-07 | End: 2019-08-07

## 2019-08-07 RX ORDER — PHENYLEPHRINE HYDROCHLORIDE 10 MG/ML
INJECTION INTRAVENOUS
Status: DISCONTINUED | OUTPATIENT
Start: 2019-08-07 | End: 2019-08-07

## 2019-08-07 RX ORDER — EPHEDRINE SULFATE 50 MG/ML
INJECTION, SOLUTION INTRAVENOUS
Status: DISCONTINUED | OUTPATIENT
Start: 2019-08-07 | End: 2019-08-07

## 2019-08-07 RX ORDER — HEPARIN SODIUM 5000 [USP'U]/ML
5000 INJECTION, SOLUTION INTRAVENOUS; SUBCUTANEOUS EVERY 8 HOURS
Status: DISCONTINUED | OUTPATIENT
Start: 2019-08-08 | End: 2019-08-09 | Stop reason: HOSPADM

## 2019-08-07 RX ORDER — MIDAZOLAM HYDROCHLORIDE 1 MG/ML
2 INJECTION INTRAMUSCULAR; INTRAVENOUS
Status: ACTIVE | OUTPATIENT
Start: 2019-08-07 | End: 2019-08-07

## 2019-08-07 RX ORDER — CEFAZOLIN SODIUM 1 G/3ML
2 INJECTION, POWDER, FOR SOLUTION INTRAMUSCULAR; INTRAVENOUS
Status: COMPLETED | OUTPATIENT
Start: 2019-08-07 | End: 2019-08-07

## 2019-08-07 RX ORDER — FENTANYL CITRATE 50 UG/ML
INJECTION, SOLUTION INTRAMUSCULAR; INTRAVENOUS
Status: DISCONTINUED | OUTPATIENT
Start: 2019-08-07 | End: 2019-08-07

## 2019-08-07 RX ORDER — SODIUM CHLORIDE 0.9 % (FLUSH) 0.9 %
3 SYRINGE (ML) INJECTION
Status: DISCONTINUED | OUTPATIENT
Start: 2019-08-07 | End: 2019-08-07 | Stop reason: HOSPADM

## 2019-08-07 RX ORDER — MUPIROCIN 20 MG/G
1 OINTMENT TOPICAL
Status: COMPLETED | OUTPATIENT
Start: 2019-08-07 | End: 2019-08-07

## 2019-08-07 RX ORDER — FAMOTIDINE 20 MG/1
20 TABLET, FILM COATED ORAL 2 TIMES DAILY
Status: DISCONTINUED | OUTPATIENT
Start: 2019-08-07 | End: 2019-08-09 | Stop reason: HOSPADM

## 2019-08-07 RX ORDER — ACETAMINOPHEN 500 MG
1000 TABLET ORAL
Status: COMPLETED | OUTPATIENT
Start: 2019-08-07 | End: 2019-08-07

## 2019-08-07 RX ORDER — PROPOFOL 10 MG/ML
VIAL (ML) INTRAVENOUS CONTINUOUS PRN
Status: DISCONTINUED | OUTPATIENT
Start: 2019-08-07 | End: 2019-08-07

## 2019-08-07 RX ORDER — PREGABALIN 75 MG/1
75 CAPSULE ORAL
Status: COMPLETED | OUTPATIENT
Start: 2019-08-07 | End: 2019-08-07

## 2019-08-07 RX ORDER — SODIUM CHLORIDE, SODIUM LACTATE, POTASSIUM CHLORIDE, CALCIUM CHLORIDE 600; 310; 30; 20 MG/100ML; MG/100ML; MG/100ML; MG/100ML
INJECTION, SOLUTION INTRAVENOUS CONTINUOUS
Status: DISCONTINUED | OUTPATIENT
Start: 2019-08-07 | End: 2019-08-09 | Stop reason: HOSPADM

## 2019-08-07 RX ORDER — MAG HYDROX/ALUMINUM HYD/SIMETH 200-200-20
30 SUSPENSION, ORAL (FINAL DOSE FORM) ORAL EVERY 4 HOURS PRN
Status: DISCONTINUED | OUTPATIENT
Start: 2019-08-07 | End: 2019-08-09 | Stop reason: HOSPADM

## 2019-08-07 RX ORDER — MORPHINE SULFATE 2 MG/ML
2 INJECTION, SOLUTION INTRAMUSCULAR; INTRAVENOUS
Status: DISCONTINUED | OUTPATIENT
Start: 2019-08-07 | End: 2019-08-09 | Stop reason: HOSPADM

## 2019-08-07 RX ORDER — METOPROLOL SUCCINATE 25 MG/1
25 TABLET, EXTENDED RELEASE ORAL DAILY
Status: DISCONTINUED | OUTPATIENT
Start: 2019-08-08 | End: 2019-08-09 | Stop reason: HOSPADM

## 2019-08-07 RX ORDER — LORAZEPAM 2 MG/ML
0.25 INJECTION INTRAMUSCULAR ONCE AS NEEDED
Status: COMPLETED | OUTPATIENT
Start: 2019-08-07 | End: 2019-08-07

## 2019-08-07 RX ORDER — KETAMINE HYDROCHLORIDE 10 MG/ML
INJECTION, SOLUTION INTRAMUSCULAR; INTRAVENOUS
Status: DISCONTINUED | OUTPATIENT
Start: 2019-08-07 | End: 2019-08-07

## 2019-08-07 RX ORDER — METOCLOPRAMIDE HYDROCHLORIDE 5 MG/ML
10 INJECTION INTRAMUSCULAR; INTRAVENOUS EVERY 10 MIN PRN
Status: DISCONTINUED | OUTPATIENT
Start: 2019-08-07 | End: 2019-08-07 | Stop reason: HOSPADM

## 2019-08-07 RX ORDER — HYDROCODONE BITARTRATE AND ACETAMINOPHEN 10; 325 MG/1; MG/1
1 TABLET ORAL EVERY 4 HOURS PRN
Status: DISCONTINUED | OUTPATIENT
Start: 2019-08-07 | End: 2019-08-08

## 2019-08-07 RX ORDER — ONDANSETRON 8 MG/1
8 TABLET, ORALLY DISINTEGRATING ORAL EVERY 6 HOURS PRN
Status: DISCONTINUED | OUTPATIENT
Start: 2019-08-07 | End: 2019-08-09 | Stop reason: HOSPADM

## 2019-08-07 RX ORDER — LIDOCAINE HYDROCHLORIDE AND EPINEPHRINE 10; 10 MG/ML; UG/ML
INJECTION, SOLUTION INFILTRATION; PERINEURAL
Status: DISCONTINUED | OUTPATIENT
Start: 2019-08-07 | End: 2019-08-07 | Stop reason: HOSPADM

## 2019-08-07 RX ADMIN — KETAMINE HYDROCHLORIDE 30 MG: 10 INJECTION, SOLUTION INTRAMUSCULAR; INTRAVENOUS at 08:08

## 2019-08-07 RX ADMIN — HYDROMORPHONE HYDROCHLORIDE 0.2 MG: 1 INJECTION, SOLUTION INTRAMUSCULAR; INTRAVENOUS; SUBCUTANEOUS at 06:08

## 2019-08-07 RX ADMIN — SODIUM CHLORIDE, SODIUM GLUCONATE, SODIUM ACETATE, POTASSIUM CHLORIDE, MAGNESIUM CHLORIDE, SODIUM PHOSPHATE, DIBASIC, AND POTASSIUM PHOSPHATE: .53; .5; .37; .037; .03; .012; .00082 INJECTION, SOLUTION INTRAVENOUS at 09:08

## 2019-08-07 RX ADMIN — FENTANYL CITRATE 100 MCG: 50 INJECTION, SOLUTION INTRAMUSCULAR; INTRAVENOUS at 08:08

## 2019-08-07 RX ADMIN — ONDANSETRON 4 MG: 2 INJECTION INTRAMUSCULAR; INTRAVENOUS at 12:08

## 2019-08-07 RX ADMIN — EPHEDRINE SULFATE 10 MG: 50 INJECTION, SOLUTION INTRAMUSCULAR; INTRAVENOUS; SUBCUTANEOUS at 08:08

## 2019-08-07 RX ADMIN — PROPOFOL 50 MG: 10 INJECTION, EMULSION INTRAVENOUS at 09:08

## 2019-08-07 RX ADMIN — CELECOXIB 400 MG: 200 CAPSULE ORAL at 07:08

## 2019-08-07 RX ADMIN — MUPIROCIN 1 G: 20 OINTMENT TOPICAL at 07:08

## 2019-08-07 RX ADMIN — HYDROMORPHONE HYDROCHLORIDE 0.2 MG: 1 INJECTION, SOLUTION INTRAMUSCULAR; INTRAVENOUS; SUBCUTANEOUS at 05:08

## 2019-08-07 RX ADMIN — MIDAZOLAM HYDROCHLORIDE 2 MG: 1 INJECTION, SOLUTION INTRAMUSCULAR; INTRAVENOUS at 08:08

## 2019-08-07 RX ADMIN — SODIUM CHLORIDE: 0.9 INJECTION, SOLUTION INTRAVENOUS at 08:08

## 2019-08-07 RX ADMIN — MORPHINE SULFATE 2 MG: 2 INJECTION, SOLUTION INTRAMUSCULAR; INTRAVENOUS at 08:08

## 2019-08-07 RX ADMIN — LORAZEPAM 0.25 MG: 2 INJECTION INTRAMUSCULAR; INTRAVENOUS at 05:08

## 2019-08-07 RX ADMIN — HYDROCODONE BITARTRATE AND ACETAMINOPHEN 1 TABLET: 10; 325 TABLET ORAL at 10:08

## 2019-08-07 RX ADMIN — DIAZEPAM 5 MG: 5 TABLET ORAL at 08:08

## 2019-08-07 RX ADMIN — PHENYLEPHRINE HYDROCHLORIDE 100 MCG: 10 INJECTION INTRAVENOUS at 08:08

## 2019-08-07 RX ADMIN — SODIUM CHLORIDE, SODIUM GLUCONATE, SODIUM ACETATE, POTASSIUM CHLORIDE, MAGNESIUM CHLORIDE, SODIUM PHOSPHATE, DIBASIC, AND POTASSIUM PHOSPHATE: .53; .5; .37; .037; .03; .012; .00082 INJECTION, SOLUTION INTRAVENOUS at 02:08

## 2019-08-07 RX ADMIN — CEFAZOLIN 2 G: 330 INJECTION, POWDER, FOR SOLUTION INTRAMUSCULAR; INTRAVENOUS at 01:08

## 2019-08-07 RX ADMIN — FAMOTIDINE 20 MG: 20 TABLET, FILM COATED ORAL at 09:08

## 2019-08-07 RX ADMIN — KETAMINE HYDROCHLORIDE 10 MG: 10 INJECTION, SOLUTION INTRAMUSCULAR; INTRAVENOUS at 09:08

## 2019-08-07 RX ADMIN — LIDOCAINE HYDROCHLORIDE 75 MG: 20 INJECTION, SOLUTION INTRAVENOUS at 08:08

## 2019-08-07 RX ADMIN — PROPOFOL 200 MCG/KG/MIN: 10 INJECTION, EMULSION INTRAVENOUS at 08:08

## 2019-08-07 RX ADMIN — DEXAMETHASONE SODIUM PHOSPHATE 8 MG: 4 INJECTION, SOLUTION INTRAMUSCULAR; INTRAVENOUS at 08:08

## 2019-08-07 RX ADMIN — HYDROCODONE BITARTRATE AND ACETAMINOPHEN 1 TABLET: 10; 325 TABLET ORAL at 08:08

## 2019-08-07 RX ADMIN — REMIFENTANIL HYDROCHLORIDE 0.2 MCG/KG/MIN: 1 INJECTION, POWDER, LYOPHILIZED, FOR SOLUTION INTRAVENOUS at 08:08

## 2019-08-07 RX ADMIN — MUPIROCIN 1 G: 20 OINTMENT TOPICAL at 09:08

## 2019-08-07 RX ADMIN — ROCURONIUM BROMIDE 10 MG: 10 INJECTION, SOLUTION INTRAVENOUS at 12:08

## 2019-08-07 RX ADMIN — EPHEDRINE SULFATE 10 MG: 50 INJECTION, SOLUTION INTRAMUSCULAR; INTRAVENOUS; SUBCUTANEOUS at 09:08

## 2019-08-07 RX ADMIN — ACETAMINOPHEN 1000 MG: 500 TABLET ORAL at 07:08

## 2019-08-07 RX ADMIN — PREGABALIN 75 MG: 75 CAPSULE ORAL at 07:08

## 2019-08-07 RX ADMIN — ROCURONIUM BROMIDE 40 MG: 10 INJECTION, SOLUTION INTRAVENOUS at 08:08

## 2019-08-07 RX ADMIN — SODIUM CHLORIDE, SODIUM GLUCONATE, SODIUM ACETATE, POTASSIUM CHLORIDE, MAGNESIUM CHLORIDE, SODIUM PHOSPHATE, DIBASIC, AND POTASSIUM PHOSPHATE: .53; .5; .37; .037; .03; .012; .00082 INJECTION, SOLUTION INTRAVENOUS at 08:08

## 2019-08-07 RX ADMIN — KETAMINE HYDROCHLORIDE 10 MG: 10 INJECTION, SOLUTION INTRAMUSCULAR; INTRAVENOUS at 11:08

## 2019-08-07 RX ADMIN — GABAPENTIN 300 MG: 300 CAPSULE ORAL at 09:08

## 2019-08-07 RX ADMIN — SODIUM CHLORIDE, SODIUM GLUCONATE, SODIUM ACETATE, POTASSIUM CHLORIDE, MAGNESIUM CHLORIDE, SODIUM PHOSPHATE, DIBASIC, AND POTASSIUM PHOSPHATE: .53; .5; .37; .037; .03; .012; .00082 INJECTION, SOLUTION INTRAVENOUS at 11:08

## 2019-08-07 RX ADMIN — CEFAZOLIN 2 G: 330 INJECTION, POWDER, FOR SOLUTION INTRAMUSCULAR; INTRAVENOUS at 09:08

## 2019-08-07 RX ADMIN — ACETAMINOPHEN 1000 MG: 10 INJECTION, SOLUTION INTRAVENOUS at 10:08

## 2019-08-07 RX ADMIN — PROPOFOL 200 MG: 10 INJECTION, EMULSION INTRAVENOUS at 08:08

## 2019-08-07 RX ADMIN — EPHEDRINE SULFATE 5 MG: 50 INJECTION, SOLUTION INTRAMUSCULAR; INTRAVENOUS; SUBCUTANEOUS at 09:08

## 2019-08-07 RX ADMIN — LIDOCAINE HYDROCHLORIDE 5 MG: 10 INJECTION, SOLUTION EPIDURAL; INFILTRATION; INTRACAUDAL; PERINEURAL at 06:08

## 2019-08-07 NOTE — OP NOTE
DATE OF PROCEDURE: 8/7/2019     PREOPERATIVE DIAGNOSES:   Spinal stenosis of lumbar region without neurogenic claudication [M48.061]    POSTOPERATIVE DIAGNOSES:   Spinal stenosis of lumbar region without neurogenic claudication [M48.061]    PROCEDURES PERFORMED:   Procedure(s) (LRB):  FUSION, SPINE, MINIMALLY INVASIVE- Direct lateral fusion with posterior pedicle screws: Lateral LEFT L3-4 and L4-5; Posterior RIGHT MIS TLIF L5-S1 (Right)    Surgeon(s) and Role:     * José Miguel Hitchcock MD - Primary  Resident- Abhinav Saldana MD    ANESTHESIA: General    ESTIMATED BLOOD LOSS: 100cc      INSTRUMENTATION USED: GLOBUS     CLINICAL HISTORY AND INDICATIONS FOR SURGERY: 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 White male  that presented to my clinic with symptoms of axial mechanical lower back pain and right lower extremity radiculopathy, which failed to respond to physical therapy and spinal injections. We discussed all the attendant risks, benefits, and potential complications of surgery and she wanted to proceed with surgery and consented to surgery.     OPERATIVE PROCEDURE: The patient was brought in to the Operating Room, identified and general anesthesia was induced using endotracheal intubation. All the required IV lines were placed. We set up for intraoperative  monitoring and baseline parameters were obtained. The patient was then gently logrolled in a lateral decubitus position with the left side up. True AP and   lateral x-ray were obtained and the patient was secured to the operating table and all neurocutaneous pressure points were checked and padded. We marked the entrance point of the skin incision for access to L3-L5 disk spaces. The area was then prepped and draped using sterile technique. We infiltrated local anesthetic. We cannulated the iliac crest with Jamshidi needle to obtain bone marrow aspirate which was the spun down to obtain stem cells for later use in our interbody fusion.     We incised the skin,  subcutaneous tissue and then dissected the external oblique, ,internal oblique the rectus abdominis muscle along their fibers. We went through the fascia of the rectus abdominis muscle to get into the retroperitoneal fat. We mobilized the abdominal content anteriorly and then placed the first dilator with continuous electrophysiologic monitoring over the psoas muscle. We used lateral x-ray to cannulate the L4-L5 disk space. We placed a K-wire and then assembled the XLIF retractor system over the K-wire. We then proceeded with the AP x-ray and placed the carson to prevent any migration of the retractor system. We then attached the light source. We stimulated the psoas, removed remnant psoas muscle overlying the L4-L5 disk space and there were no neural structures. We then made an annulotomy. We used the Cerrato to cut through the contralateral annulus. We then used increasing sizes of disk space raffi up to size 9mm to do a complete discectomy at L4-L5. We used ring curette to remove cartilaginous endplate materials and additional disc materials. We then sized the disk space and we chose a size 11 x 55 x 18 mm 4-degree lordotic cage. The lordotic cage was packed with BMA and Formagraft and was inserted at L4-5 using AP and lateral x-ray guidance. We took final AP and lateral x-ray and were satisfied with the placement.     We then repeated the same steps at L3-4 to do an XLIF and placed 11 X 55 X 18mm 4-degree. Final placement of the spacers were satisfactory. We used bipolar cautery and Gelfoam soaked in thrombin for hemostasis throughout the surgery. We slowly withdrew the XLIF retraction system and cauterized any bleeding points on our way out. We then used 0 Vicryl suture to approximate the fascia of the external oblique and the deep cutaneous tissue. We used 2-0 Vicryl suture to approximate the subcuticular layer and 4-0 Monocryl to approximate the skin edges.       For MIS-TLIF at L5-S1 we repositioned the patient  prone on the Tyrell table and the thoracolumbar areas was prepped and draped. We incised the skin, subcutaneous tissue, and the thoracolumbar fascia bilaterally. We then brought in the fluoroscopic machine, and using the AP images, we cannulated the pedicles of L3, L4, L5 and S1 bilaterally using the Jamshidi needles. Using lateral x-ray, we advanced  Jamshidi needles and placed the K-wires into the bodies of  L3, L4, L5 and S1. We bent the K-wires out of the way on the Bilateral side and assembled the METRx tube over theL5-S1 disk space in order to do the TLIF. We brought in the operating microscope, and then using monopolar cautery, we removed soft tissue overlying the L5-S1 lamina and facet on the Right side and the L5 lamina. We then used a high-speed drill and Kerrison of different configuration to do a laminotomy at L5 to decompress L5 nerve root, and osteotome and mallet to remove the medial facet at L5-S1. The removed facet bone was given to the scrub tech who removed all the soft tissue, and grind up the bone for later use in our interbody. We used Kerrison of different configuration to undercut the lateral facet until it was flush with the pedicle of S1. We then peeled the ligamentum flavum inferolaterally, removed it in piecemeal using Kerrison of different configuration. We unroofed the S1 foramen. We then retracted the thecal sac medially, cauterized epidural bleeding veins, and used a 15 blade to make a large annulotomy into the L5-S1 disk space.  We then used disk space raffi up to size 9mm to do a complete diskectomy at L5-S1. We used down-pushing curette and ring curette to remove cartilaginous endplate materials and additional disk materials. We then placed a mixture of BMA and autograft from facet anteriorly into the L5-S1 disk space using fluoroscopic image guidance. We then chose an 13 X 22mm  TLIF PEEK cage filled with autograft and placed it at L5-L5sqhzz lateral and AP image guidance. We  were satisfied with the final placement of the TLIF cage. We removed the Metrx tube and then placed 6.5 X 45mm pedicle screws at L3, L4 and L5 bilaterally and 6.5 X 40mm at S1 bilaterally. We placed adjoining rods and then placed set screws as per standard GLOBUS technique. We took out the screw extenders and then applied final tightening to the set screws at L3, L4, L5 and S1 bilaterally.  Final AP and lateral x-ray confirmed good placement of the instrumentation and the spacers. We irrigated the area of the surgery with bacitracin and saline, and injected some Marcaine into the paraspinal muscles. We then used 0 Vicryl suture to approximate the thoracolumbar fascia, then 2-0 Vicryl stitch for the subcuticular layer and 4-0 Monocryl to approximate the skin edges. We applied bacitracin ointment at L4-L5, placed Telfa and then covered the incision with Tegaderm.     The patient was repositioned supine on the hospital bed, weaned off general anesthesia and extubated. he was moving both lower extremities symmetrically and strongly and was transferred to the Recovery Room in stable condition.

## 2019-08-07 NOTE — TRANSFER OF CARE
"Anesthesia Transfer of Care Note    Patient: Franko Buchanan MD    Procedure(s) Performed: Procedure(s) (LRB):  FUSION, SPINE, MINIMALLY INVASIVE- Direct lateral fusion with posterior pedicle screws: Lateral LEFT L3-4 and L4-5; Posterior RIGHT MIS TLIF L5-S1 (Right)    Patient location: PACU    Anesthesia Type: general    Transport from OR: Transported from OR on 6-10 L/min O2 by face mask with adequate spontaneous ventilation    Post pain: adequate analgesia    Post assessment: no apparent anesthetic complications and tolerated procedure well    Post vital signs: stable    Level of consciousness: sedated    Nausea/Vomiting: no nausea/vomiting    Complications: none    Transfer of care protocol was followed      Last vitals:   Visit Vitals  /73 (BP Location: Right arm, Patient Position: Lying)   Pulse 67   Temp 36.7 °C (98 °F) (Oral)   Resp 18   Ht 6' 1" (1.854 m)   Wt 100.7 kg (222 lb)   SpO2 99%   BMI 29.29 kg/m²     "

## 2019-08-07 NOTE — BRIEF OP NOTE
[facility]  Brief Operative Note    SUMMARY     Surgery Date: 8/7/2019     Surgeon(s) and Role:     * José Miguel Hitchcock MD - Primary    Assisting Surgeon: None    Pre-op Diagnosis:  Spinal stenosis of lumbar region without neurogenic claudication [M48.061]    Post-op Diagnosis:  Post-Op Diagnosis Codes:     * Spinal stenosis of lumbar region without neurogenic claudication [M48.061]    Procedure(s) (LRB):  FUSION, SPINE, MINIMALLY INVASIVE- Direct lateral fusion with posterior pedicle screws: Lateral LEFT L3-4 and L4-5; Posterior RIGHT MIS TLIF L5-S1 (Right)    Anesthesia: General    Description of Procedure: L L3/4, L4/5 XLIF; R L5/S1 TLIF w/ posterior pedicle screw placement L3-S1    Description of the findings of the procedure: See above    Estimated Blood Loss: 200 mL         Specimens:   Specimen (12h ago, onward)    None

## 2019-08-07 NOTE — H&P
[facility]  Neuorsurgery  History and Physical     Patient Name: Franko Buchanan MD  MRN: [id number]  Admission Date: 8/7/2019  Attending Physician: José Miguel Hitchcock MD   Primary Care Physician: Loyd Garcia MD    Patient information was obtained from patient and past medical records.     Subjective:     Chief Complaint/Reason for Admission: Spinal stenosis w/ claudication     HPI:  69M w/ multilevel lumbar spondylosis w/ spinal stenosis who presents for  L3-4, L4-5 XLIF, L5-S1 TLIF, L4-5 laminectomy, and L3-S1 posterior instrumentation. Patient complains of pain in the right hip and leg has been about two years and has gotten worse in the last 3-4 months.  Pain primarily in the right hip and some in the left hip.  Pain radiates to the right hip and buttock region with radiation down the right anterior and lateral leg to the top of the right foot.  Occasional back pain. Constant right hip pain and the pain in the right leg comes and goes.  Pain worse with standing and walking and better with laying down.  No left leg pain. Patient takes advil and occasional percocet. He was not able to get the caudal FAUZIA due to social reasons. Patient denies any recent accidents or trauma, no saddle anesthesias, and no bowel or bladder incontinence.    No medications prior to admission.       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    Sleep apnea      Past Surgical History:   Procedure Laterality Date    BACK SURGERY      1990's and 2009; last by Naldo    C4-5 and C5-6 ACDF (DISCECTOMY , SPINE , CERVICAL,  ANTERIOR APPROACH W/FUSION) Right 11/28/2018    [facility]    COLONOSCOPY N/A 1/30/2018    [facility]    COLONOSCOPY N/A 12/14/2012    Performed by [facility]ue [facility] at [facility]O (4TH FLR)    COSMETIC SURGERY      Focal Laser       Right eye    HERNIA REPAIR Right     inguinal    SKIN CANCER EXCISION      SPINE SURGERY      l3-4/ l5-s1  (x 2)    TONSILLECTOMY       Family History     Problem Relation (Age of Onset)    Alzheimer's disease Maternal Grandmother    Cancer Father, Sister, Paternal Grandfather    Cataracts Maternal Grandmother    Colon polyps Father    Glaucoma Mother    Heart failure Maternal Grandfather    No Known Problems Sister, Maternal Aunt, Maternal Uncle, Paternal Aunt, Paternal Uncle, Paternal Grandmother    Thyroid disease Sister        Tobacco Use    Smoking status: Never Smoker    Smokeless tobacco: Never Used   Substance and Sexual Activity    Alcohol use: Yes     Alcohol/week: 3.0 oz     Types: 6 Standard drinks or equivalent per week     Frequency: 2-4 times a month     Drinks per session: 1 or 2     Binge frequency: Never     Comment: 3 X WEEK     Drug use: No    Sexual activity: Not on file     Review of Systems   Constitutional: Negative for activity change, chills, diaphoresis, fatigue and fever.   HENT: Negative for tinnitus, trouble swallowing and voice change.    Eyes: Negative for photophobia and visual disturbance.   Respiratory: Negative for cough, choking, chest tightness, shortness of breath, wheezing and stridor.    Cardiovascular: Negative for chest pain, palpitations and leg swelling.   Gastrointestinal: Negative for abdominal distention, abdominal pain, constipation, diarrhea, nausea and vomiting.   Endocrine: Negative for polydipsia, polyphagia and polyuria.   Genitourinary: Negative for dysuria, frequency, hematuria and urgency.   Musculoskeletal: Positive for back pain and gait problem.  Negative for neck pain and neck stiffness.   Skin: Negative for color change, pallor, rash and wound.   Neurological: Negative for dizziness, tremors, seizures, syncope, facial asymmetry, speech difficulty, weakness, light-headedness, numbness and headaches.   Hematological: Negative for adenopathy. Does not bruise/bleed easily.   Psychiatric/Behavioral: Negative for agitation, behavioral problems and confusion.     Objective:     Weight: 100.7 kg (222 lb)  Body mass index is 29.29 kg/m².  Vital Signs (Most Recent):    Vital Signs (24h Range):                    Neurosurgery Physical Exam  General: AOx3, GCS E4V5M6  CNII-XII: Intact on fine exam, PERRL, visual fields grossly intact, EOMi, facial sensation preserved, no facial assymetry, tongue/uvula/palate midline, shoulder shrug equal, no pronator drift  Extremities: 5/5 motor throughout, sensorium intact throughout, coordination intact throughout, DTRs 2+, no pathological reflexes, no sensory level present    Significant Labs:  No results for input(s): GLU, NA, K, CL, CO2, BUN, CREATININE, CALCIUM, MG in the last 48 hours.  No results for input(s): WBC, HGB, HCT, PLT in the last 48 hours.  No results for input(s): LABPT, INR, APTT in the last 48 hours.  Microbiology Results (last 7 days)     ** No results found for the last 168 hours. **        ABGs: No results for input(s): PH, PCO2, PO2, HCO3, POCSATURATED, BE in the last 48 hours.  Cardiac markers: No results for input(s): CKMB, CPKMB, TROPONINT, TROPONINI, MYOGLOBIN in the last 48 hours.  CMP: No results for input(s): GLU, CALCIUM, ALBUMIN, PROT, NA, K, CO2, CL, BUN, CREATININE, ALKPHOS, ALT, AST, BILITOT in the last 48 hours.  CRP: No results for input(s): CRP in the last 48 hours.  ESR: No results for input(s): POCESR, ERYTHROCYTES in the last 48 hours.  LFTs: No results for input(s): ALT, AST, ALKPHOS, BILITOT, PROT, ALBUMIN in the last 48 hours.  Procalcitonin: No results for input(s): PROCAL in the last  48 hours.    Significant Diagnostics:  I have reviewed all pertinent imaging results/findings within the past 24 hours.    Assessment and Plan:     Spinal stenosis  69M w/ multilevel lumbar spondylosis w/ spinal stenosis who presents for  L3-4, L4-5 XLIF, L5-S1 TLIF, L4-5 laminectomy, and L3-S1 posterior instrumentation:    --Patient evaluated prior to surgery  --All diagnostics and imaging reviewed  --Patient NPO since MN  --No anti-coag/plt medication in the last 72h  --Patient consented and all questions answered  --Further reccs to follow s/p surgery        Trino Saldana MD  Neurosurgery  [facility]

## 2019-08-07 NOTE — ANESTHESIA PROCEDURE NOTES
Arterial    Diagnosis: Back pain    Patient location during procedure: done in OR  Procedure start time: 8/7/2019 8:47 AM  Timeout: 8/7/2019 8:45 AM  Procedure end time: 8/7/2019 8:50 AM    Staffing  Authorizing Provider: Logan Wade MD  Performing Provider: Stephany Cox CRNA    Anesthesiologist was present at the time of the procedure.    Preanesthetic Checklist  Completed: patient identified, site marked, surgical consent, pre-op evaluation, timeout performed, IV checked, risks and benefits discussed, monitors and equipment checked and anesthesia consent givenArterial  Skin Prep: chlorhexidine gluconate  Local Infiltration: none  Orientation: left  Location: radial  Catheter Size: 20 G  Catheter placement by Anatomical landmarks. Heme positive aspiration all ports.Insertion Attempts: 1  Assessment  Dressing: secured with tape and tegaderm  Patient: Tolerated well

## 2019-08-08 PROCEDURE — 97116 GAIT TRAINING THERAPY: CPT

## 2019-08-08 PROCEDURE — 63600175 PHARM REV CODE 636 W HCPCS: Performed by: STUDENT IN AN ORGANIZED HEALTH CARE EDUCATION/TRAINING PROGRAM

## 2019-08-08 PROCEDURE — 99024 POSTOP FOLLOW-UP VISIT: CPT | Mod: ,,, | Performed by: PHYSICIAN ASSISTANT

## 2019-08-08 PROCEDURE — 20600001 HC STEP DOWN PRIVATE ROOM

## 2019-08-08 PROCEDURE — 97161 PT EVAL LOW COMPLEX 20 MIN: CPT

## 2019-08-08 PROCEDURE — 25000003 PHARM REV CODE 250: Performed by: STUDENT IN AN ORGANIZED HEALTH CARE EDUCATION/TRAINING PROGRAM

## 2019-08-08 PROCEDURE — 99024 PR POST-OP FOLLOW-UP VISIT: ICD-10-PCS | Mod: ,,, | Performed by: PHYSICIAN ASSISTANT

## 2019-08-08 PROCEDURE — 94761 N-INVAS EAR/PLS OXIMETRY MLT: CPT

## 2019-08-08 PROCEDURE — 97530 THERAPEUTIC ACTIVITIES: CPT

## 2019-08-08 PROCEDURE — 97165 OT EVAL LOW COMPLEX 30 MIN: CPT

## 2019-08-08 RX ORDER — OXYCODONE AND ACETAMINOPHEN 10; 325 MG/1; MG/1
1 TABLET ORAL EVERY 4 HOURS PRN
Status: DISCONTINUED | OUTPATIENT
Start: 2019-08-08 | End: 2019-08-09 | Stop reason: HOSPADM

## 2019-08-08 RX ORDER — GABAPENTIN 300 MG/1
600 CAPSULE ORAL 3 TIMES DAILY
Status: DISCONTINUED | OUTPATIENT
Start: 2019-08-08 | End: 2019-08-09 | Stop reason: HOSPADM

## 2019-08-08 RX ADMIN — MORPHINE SULFATE 2 MG: 2 INJECTION, SOLUTION INTRAMUSCULAR; INTRAVENOUS at 07:08

## 2019-08-08 RX ADMIN — METOPROLOL SUCCINATE 25 MG: 25 TABLET, EXTENDED RELEASE ORAL at 08:08

## 2019-08-08 RX ADMIN — SENNOSIDES,DOCUSATE SODIUM 2 TABLET: 8.6; 5 TABLET, FILM COATED ORAL at 09:08

## 2019-08-08 RX ADMIN — FAMOTIDINE 20 MG: 20 TABLET, FILM COATED ORAL at 08:08

## 2019-08-08 RX ADMIN — HEPARIN SODIUM 5000 UNITS: 5000 INJECTION, SOLUTION INTRAVENOUS; SUBCUTANEOUS at 09:08

## 2019-08-08 RX ADMIN — MORPHINE SULFATE 2 MG: 2 INJECTION, SOLUTION INTRAMUSCULAR; INTRAVENOUS at 12:08

## 2019-08-08 RX ADMIN — HEPARIN SODIUM 5000 UNITS: 5000 INJECTION, SOLUTION INTRAVENOUS; SUBCUTANEOUS at 02:08

## 2019-08-08 RX ADMIN — GABAPENTIN 600 MG: 300 CAPSULE ORAL at 09:08

## 2019-08-08 RX ADMIN — FAMOTIDINE 20 MG: 20 TABLET, FILM COATED ORAL at 09:08

## 2019-08-08 RX ADMIN — DIAZEPAM 5 MG: 5 TABLET ORAL at 09:08

## 2019-08-08 RX ADMIN — GABAPENTIN 600 MG: 300 CAPSULE ORAL at 02:08

## 2019-08-08 RX ADMIN — HYDROCODONE BITARTRATE AND ACETAMINOPHEN 1 TABLET: 10; 325 TABLET ORAL at 03:08

## 2019-08-08 RX ADMIN — OXYCODONE HYDROCHLORIDE AND ACETAMINOPHEN 1 TABLET: 10; 325 TABLET ORAL at 05:08

## 2019-08-08 RX ADMIN — OXYCODONE HYDROCHLORIDE AND ACETAMINOPHEN 1 TABLET: 10; 325 TABLET ORAL at 10:08

## 2019-08-08 RX ADMIN — HEPARIN SODIUM 5000 UNITS: 5000 INJECTION, SOLUTION INTRAVENOUS; SUBCUTANEOUS at 06:08

## 2019-08-08 RX ADMIN — MORPHINE SULFATE 2 MG: 2 INJECTION, SOLUTION INTRAMUSCULAR; INTRAVENOUS at 02:08

## 2019-08-08 RX ADMIN — DIAZEPAM 5 MG: 5 TABLET ORAL at 01:08

## 2019-08-08 RX ADMIN — GABAPENTIN 600 MG: 300 CAPSULE ORAL at 08:08

## 2019-08-08 RX ADMIN — OXYCODONE HYDROCHLORIDE AND ACETAMINOPHEN 1 TABLET: 10; 325 TABLET ORAL at 08:08

## 2019-08-08 RX ADMIN — DIAZEPAM 5 MG: 5 TABLET ORAL at 02:08

## 2019-08-08 RX ADMIN — OXYCODONE HYDROCHLORIDE AND ACETAMINOPHEN 1 TABLET: 10; 325 TABLET ORAL at 01:08

## 2019-08-08 RX ADMIN — MORPHINE SULFATE 2 MG: 2 INJECTION, SOLUTION INTRAMUSCULAR; INTRAVENOUS at 06:08

## 2019-08-08 NOTE — PT/OT/SLP EVAL
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  He presents with the following performance deficits affecting function: weakness, impaired endurance, gait instability, impaired functional mobilty, decreased lower extremity function, orthopedic precautions, impaired self care skills, impaired balance. Pt tolerated evaluation well despite c/o of lower back and L quad pain. Activity tolerance limited this date 2/2 pain. Wife present and active in pt's POC. OT recommending home with home health when pt is medically stable to further maximize pt's functional independence. While in house, pt will benefit from skilled OT 3x/wk for education, ADL re-training, and strength building to return to PLOF.      Rehab Prognosis: Good; patient would benefit from acute skilled OT services to address these deficits and reach maximum level of function.       Plan:     Patient to be seen 3 x/week to address the above listed problems via self-care/home management, therapeutic activities, therapeutic exercises  · Plan of Care Expires: 09/06/19  · Plan of Care Reviewed with: patient, spouse    Subjective     Chief Complaint: Back and L quad pain  Patient/Family Comments/goals: Pt and spouse agreeable to OT POC.     Occupational Profile:  Living Environment: Pt lives with spouse and 4 dogs in a 2SH with 3 ANNIE and a bar located on R side. Bathroom set up: Walk in shower with built in bench  Pt's bed and bathroom are on the 1st floor.  Previous level of function: I in all ADLs/IADLs including working and driving: Since March, pt has not been working as much  2/2 pain and weakness. Pt reported that PTA, his R LE was more weak and now the L LE seems to be in more pain causing mild weakness.    Falls: None  Roles and Routines: Homemaker, spouse, OBGYN   Equipment Used at Home:  none(has access to stand walker and BSC)  Assistance upon Discharge: Pt will have assistance from spouse upon discharge.     Pain/Comfort:  · Pain Rating 1: 6/10  · Location - Side 1: Bilateral  · Location - Orientation 1: lower  · Location 1: back  · Pain Addressed 1: Cessation of Activity, Pre-medicate for activity, Reposition  · Pain Rating Post-Intervention 1: 8/10  · Location - Side 2: Left  · Location 2: (quad)  · Pain Addressed 2: Reposition, Distraction, Cessation of Activity    Patients cultural, spiritual, Anglican conflicts given the current situation: no    Objective:     Communicated with: RN prior to session.  Patient found left sidelying with peripheral IV upon OT entry to room.    General Precautions: Standard, fall   Orthopedic Precautions:spinal precautions   Braces: LSO     Occupational Performance:    Bed Mobility:  HOB elevated   · Patient completed Scooting/Bridging with stand by assistance for EOB positioning   · Patient completed Supine to Sit with stand by assistance and side rail    Functional Mobility/Transfers:  · Patient completed Sit <> Stand Transfer from bed with minimum assistance  with  no assistive device; CGA from bed with RW    · Functional Mobility: Pt ambulated HHD with CGA and RW. No LOB noted. No RBs required. Cueing provided for upright posture and neutral gaze    Activities of Daily Living:  · Grooming: set up assistance to don gown as jacket in sitting   · Lower Body Dressing: moderate assistance to don BLE shoes with a  EOB due to precuations; pt slid feet into shoes with SPV  · Brace: Mod A to don LSO brace EOB; pt pulled and adjusted lateral straps for comfort    Cognitive/Visual Perceptual:  Cognitive/Psychosocial Skills:     -       Oriented to:  Person, Place, Time and Situation   -       Follows Commands/attention:Follows multistep  commands  -       Communication: clear/fluent  -       Memory: No Deficits noted  -       Safety awareness/insight to disability: intact   -       Mood/Affect/Coping skills/emotional control: Appropriate to situation and Pleasant  Visual/Perceptual:      -Intact no defs noted    Physical Exam:  Balance:    -       Sitting: SPV     Standing: CGA  Postural examination/scapula alignment:    -       Rounded shoulders  Skin integrity: Open scar Mid back due to laminectomy; dressing changed during session by RN  Edema:  None noted  Sensation:    -       Intact  Upper Extremity Range of Motion:     -       Right Upper Extremity: WFL  -       Left Upper Extremity: WFL  Upper Extremity Strength:    -       Right Upper Extremity: WFL  5/5  -       Left Upper Extremity: WFL  5/5   Strength:    -       Right Upper Extremity: WFL  -       Left Upper Extremity: WFL  Fine Motor Coordination:    -       Intact    AMPAC 6 Click ADL:  AMPAC Total Score: 18    Treatment & Education:  - Role of OT/ OT POC  - Self care safety/ independence  - Functional transfer/ mobility safety  - Bed mobility safety using log roll technique  - Importance of sitting UIC for back relief   - Spinal precautions: Pt verbalized 2/3 precautions post session.  - Importance of ambulating back to bed with nsg's assistance for safety and fall prevention   - Increased time provided for active listening  - Pt tolerated ~5 minutes EOB with SPV for MMT and brace management; no deviations in balance noted.   Education:    Patient left up in chair with all lines intact, call button in reach, RN notified and Spouse present    GOALS:   Multidisciplinary Problems     Occupational Therapy Goals        Problem: Occupational Therapy Goal    Goal Priority Disciplines Outcome Interventions   Occupational Therapy Goal     OT, PT/OT Ongoing (interventions implemented as appropriate)     Description:  Goals to be met by: 8/18/19    Patient will increase functional independence with ADLs by performing:    UE Dressing with Modified [place].  LE Dressing with Modified [place] with AE as needed.  Toileting from toilet with Modified [place] for hygiene and clothing management.   Supine to sit with Modified [place] with HOB flat.  Step transfer with Supervision and AD as needed to prepare for household mobility upon returning home.  Toilet transfer to toilet with Supervision.                      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    Sleep apnea        Past Surgical History:   Procedure Laterality Date    BACK SURGERY      1990's and 2009; last by Naldo    C4-5 and C5-6 ACDF (DISCECTOMY , SPINE , CERVICAL, ANTERIOR APPROACH W/FUSION) Right 11/28/2018    [facility]    COLONOSCOPY N/A 1/30/2018    [facility]    COLONOSCOPY N/A 12/14/2012    Performed by Han [facility]ohue [facility] at [facility] ENDO (4TH FLR)    COSMETIC SURGERY      Focal Laser       Right eye    FUSION, SPINE, MINIMALLY INVASIVE- Direct lateral fusion with posterior pedicle screws: Lateral LEFT L3-4 and L4-5; Posterior RIGHT MIS TLIF L5-S1 Right 8/7/2019    Performed by [facility] at [facility] OR 2ND FLR    HERNIA REPAIR Right     inguinal    SKIN CANCER EXCISION      SPINE SURGERY      l3-4/ l5-s1  (x 2)    TONSILLECTOMY         Time Tracking:     OT Date of Treatment: 08/08/19  OT Start Time: 1008  OT Stop Time: 1037  OT Total Time (min): 29 min    Billable Minutes:Evaluation  20  Therapeutic Activity 9    Lora Oconnor OT  8/8/2019

## 2019-08-08 NOTE — PT/OT/SLP EVAL
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  He presents with the following impairments/functional limitations:  weakness, impaired endurance, impaired self care skills, impaired balance, gait instability, impaired functional mobilty, decreased lower extremity function, orthopedic precautions. Prior to surgery, the patient c/o significant R leg and hip pain. Following surgery, the patient complains of L quad muscular soreness and pain that increases with L hip flexion and knee extension. The patient ambulated 70' with RW and stand by assistance. He is safe to ambulate with RW and RN assist of 1 person, he would benefit from RN mobility protocol.     Rehab Prognosis: Good; patient would benefit from acute skilled PT services to address these deficits and reach maximum level of function.    Recent Surgery: Procedure(s) (LRB):  FUSION, SPINE, MINIMALLY INVASIVE- Direct lateral fusion with posterior pedicle screws: Lateral LEFT L3-4 and L4-5; Posterior RIGHT MIS TLIF L5-S1 (Right) 1 Day Post-Op    Plan:     During this hospitalization, patient to be seen 3 x/week to address the identified rehab impairments via gait training, therapeutic activities, therapeutic exercises and progress toward the following goals:    · Plan of Care Expires:  09/08/19    Subjective     Chief Complaint: "My left leg didn't hurt as much when I was laying on my left side", "My right leg was the one bothering me before my surgery"  Patient/Family Comments/goals: return home  Pain/Comfort:  · Pain Rating 1: 6/10(low back and L quad muscle soreness)  · Pain Addressed 1: Reposition, Distraction, Cessation of Activity, Pre-medicate for " activity  · Pain Rating Post-Intervention 1: (decreased pain in standing, increased with MMT of LE)    Patients cultural, spiritual, Faith conflicts given the current situation: no    Living Environment:  The patient lives with his wife and 4 dogs in a 2 story home (bedroom and bathroom on 1st floor) with 3 steps to enter with a rail on the R. 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   Prior to admission, patients level of function was independent.  Equipment used at home: none.  DME owned (not currently used): bedside commode and shower chair.  Upon discharge, patient will have assistance from spouse.    Objective:     Communicated with RN prior to session.  Patient found HOB elevated with peripheral IV  upon PT entry to room.    General Precautions: Standard, fall   Orthopedic Precautions:spinal precautions   Braces: LSO     Exams:    Cognitive Exam  Patient is A&O x4 and follows 100% of one -step commands    Fine Motor Coordination   -       WFL     Postural Exam Patient presented with the following abnormalities:    -       Rounded shoulders  -       Forward head   Sensation    -       Light touch intact bilateral lower extremities   Skin Integrity/Edema     -       Skin integrity: patient's dressing saturated with blood following return from imaging, patient's RN alerted, dressing changed  -       Edema: NA   R LE ROM WFL   R LE Strength 3/5 hip flexion, knee ext- limited by back pain; 4/5 knee flex, and ankle DF/PF   L LE ROM WFL   L LE Strength  2+/5 hip flexion, knee ext- limited by L quad pain; 4-/5 Knee flex, and ankle DF/PF      Balance          Static Sitting independent   Dynamic Sitting independent   Static Standing contact guard assist with no AD   Dynamic Standing contact guard assist with no AD             Functional Mobility:    Bed Mobility  Rolling to R: contact guard assist  Supine to Sit:  contact guard assist, cues for log roll   Transfers  Sit to Stand:  minimum assistance on first attempt with no AD; contact guard assist 2nd attempt no AD, stand by assistance with RW. One instance of unsteadiness with stand to sit reaching back for chair- lost balance to L side, recovered with minimum assistance.    Gait  Gait Distance: 70 ft with RW  Assistance Level: stand by assistance  Description: kyphotic posture, decreased gait speed, reciprocal strides. Cues for posture and to ambulate closer to RW, cues for sequencing with RW.           Therapeutic Activities and Exercises:   Patient and spouse educated on role of therapy, goals of session, benefits of out of bed mobility. Patient agreeable to mobilize with therapy. Discussed PT plan of care during hospitalization. Patient educated that they need to call for assistance to mobilize out of bed. Whiteboard updated as appropriate. Discussed discharge recommendation of home with  PT, patient and spouse in agreement. Patient educated on how their diagnosis impacts their mobility within PT scope of practice. Reviewed donning/doffing LSO, spinal precautions and log roll- patient recalled 2/3 precautions at end of session. Patient given RW to use with RN assist for mobilizing in his room- he expressed understanding and agreement to call for assist to mobilize.     AM-PAC 6 CLICK MOBILITY  Total Score:19     Patient left up in chair with all lines intact, call button in reach, RN notified and spouse present.    GOALS:   Multidisciplinary Problems     Physical Therapy Goals        Problem: Physical Therapy Goal    Goal Priority Disciplines Outcome Goal Variances Interventions   Physical Therapy Goal     PT, PT/OT Ongoing (interventions implemented as appropriate)     Description:  Goals to be met by: 2019 (10 days)     Patient will increase functional independence with mobility by performin. Supine to sit with Modified [place]  2. Sit to supine with Modified [place]  3. Sit to stand transfer  with Modified [place]  4. Gait  x 150 feet with Modified [place] using least restrictive device or no AD.   5. Ascend/descend 3 stair with right Handrails Stand-by Assistance using least restrictive device or no AD.   6. Lower extremity exercise program x20 reps per handout, with independence to improve strength and activity tolerance.                      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O    COLONOSCOPY N/A 12/14/2012    [facility] ([facility] F[facility])    COSMETIC SURGERY      Focal Laser       Right eye    FUSION, SPINE, MINIMALLY INVASIVE- Direct lateral fusion with posterior pedicle screws: Lateral LEFT L3-4 and L4-5; Posterior RIGHT MIS TLIF L5-S1 Right 8/7/2019    Performed by [facility] at [facility] OR 2ND FLR    HERNIA REPAIR Right     inguinal    SKIN CANCER EXCISION      SPINE SURGERY      l3-4/ l5-s1  (x 2)    TONSILLECTOMY         Time Tracking:     PT Received On: 08/08/19  PT Start Time: 1008     PT Stop Time: 1039  PT Total Time (min): 31 min     Billable Minutes: Evaluation 18 and Gait Training 8 (co-eval with OT)      Jody Aly, PT  08/08/2019

## 2019-08-08 NOTE — SUBJECTIVE & OBJECTIVE
Interval History: Patient and wife states he did not sleep well throughout the night due to increased pain. During transfer today wife stated they noticed significant bleeding to the dressing and pad underneath the patient. Nurse applied pressure and dressing changed. Drainage noted to dressing during exam however no active drainage noted and incision intact with sutures. Reports LLE anterolateral throbbing pain. Denies RLE pain. Ambulating with walker. Voiding spontaneously.     Medications:  Continuous Infusions:   sodium chloride 0.9% Stopped (08/07/19 1700)    sodium chloride 0.9%      lactated ringers       Scheduled Meds:   bisacodyl  10 mg Rectal Daily    docusate  100 mg Oral Daily    famotidine  20 mg Oral BID    flecainide  300 mg Oral Daily    gabapentin  600 mg Oral TID    heparin (porcine)  5,000 Units Subcutaneous Q8H    metoprolol succinate  25 mg Oral Daily    mupirocin  1 g Nasal BID     PRN Meds:acetaminophen, aluminum-magnesium hydroxide-simethicone, diazePAM, morphine, mupirocin, ondansetron, oxyCODONE-acetaminophen, promethazine (PHENERGAN) IVPB, senna-docusate 8.6-50 mg       Objective:     Weight: 100.7 kg (222 lb)  Body mass index is 29.29 kg/m².  Vital Signs (Most Recent):  Temp: 98 °F (36.7 °C) (08/08/19 1516)  Pulse: 64 (08/08/19 1516)  Resp: 18 (08/08/19 1516)  BP: 135/69 (08/08/19 1516)  SpO2: (!) 94 % (08/08/19 1516) Vital Signs (24h Range):  Temp:  [96.7 °F (35.9 °C)-98.1 °F (36.7 °C)] 98 °F (36.7 °C)  Pulse:  [57-67] 64  Resp:  [11-20] 18  SpO2:  [94 %-100 %] 94 %  BP: (108-148)/(55-86) 135/69                          Urethral Catheter 08/07/19 0850 Non-latex;Straight-tip 16 Fr. (Active)   Site Assessment Clean;Intact 8/7/2019  8:15 PM   Collection Container Urimeter 8/7/2019  8:15 PM   Securement Method secured to top of thigh w/ adhesive device 8/7/2019  8:15 PM   Reason for Continuing Urinary Catheterization Post operative 8/7/2019  8:15 PM   CAUTI Prevention Bundle  "StatLock in place w 1" obed 8/7/2019  8:15 PM   Output (mL) 300 mL 8/7/2019  8:15 PM       Neurosurgery Physical Exam    General: well developed, well nourished, no distress.   Head: normocephalic, atraumatic  Neurologic: Alert and oriented. Thought content appropriate.  GCS: Motor: 6/Verbal: 5/Eyes: 4 GCS Total: 15  Mental Status: Awake, Alert, Oriented x 4  Language: No aphasia  Speech: No dysarthria  Cranial nerves: face symmetric, tongue midline, CN II-XII grossly intact.   Eyes: pupils equal, round, reactive to light with accomodation, EOMI.   Pulmonary: normal respirations, no signs of respiratory distress  Abdomen: soft, non-distended, not tender to palpation  Sensory: intact to light touch throughout  Motor Strength: Moves all extremities spontaneously with good tone.  Pain limited weakness to left HF otherwise full strength upper and lower extremities. No abnormal movements seen.     Strength  Deltoids Triceps Biceps Wrist Extension Wrist Flexion Hand    Upper: R 5/5 5/5 5/5 5/5 5/5 5/5    L 5/5 5/5 5/5 5/5 5/5 5/5     Iliopsoas Quadriceps Knee  Flexion Tibialis  anterior Gastro- cnemius EHL   Lower: R 5/5 5/5 5/5 5/5 5/5 5/5    L 4-/5 5/5 5/5 5/5 5/5 5/5     Forman: absent  Clonus: absent  Babinski: absent  Pulses: 2+ and symmetric radial and dorsalis pedis.  Skin: Skin is warm, dry and intact.    Incisions c/d/i with sutures approximating skin edges. No surrounding erythema or edema. No active drainage from incision. NTTP.     LSO brace in place.      Significant Labs:  No results for input(s): GLU, NA, K, CL, CO2, BUN, CREATININE, CALCIUM, MG in the last 48 hours.  Recent Labs   Lab 08/07/19  1611   WBC 13.11*   HGB 12.7*   HCT 39.1*        No results for input(s): LABPT, INR, APTT in the last 48 hours.  Microbiology Results (last 7 days)     ** No results found for the last 168 hours. **        Recent Lab Results     None        All pertinent labs from the last 24 hours have been " reviewed.    Significant Diagnostics:  I have reviewed all pertinent imaging results/findings within the past 24 hours.

## 2019-08-08 NOTE — PROGRESS NOTES
[facility]  Neurosurgery  Progress Note    Subjective:     History of Present Illness: 69M w/ multilevel lumbar spondylosis w/ spinal stenosis who presents for  L3-4, L4-5 XLIF, L5-S1 TLIF, L4-5 laminectomy, and L3-S1 posterior instrumentation. Patient complains of pain in the right hip and leg has been about two years and has gotten worse in the last 3-4 months.  Pain primarily in the right hip and some in the left hip.  Pain radiates to the right hip and buttock region with radiation down the right anterior and lateral leg to the top of the right foot.  Occasional back pain. Constant right hip pain and the pain in the right leg comes and goes.  Pain worse with standing and walking and better with laying down.  No left leg pain. Patient takes advil and occasional percocet. He was not able to get the caudal FAUZIA due to social reasons. Patient denies any recent accidents or trauma, no saddle anesthesias, and no bowel or bladder incontinence.    Post-Op Info:  Procedure(s) (LRB):  FUSION, SPINE, MINIMALLY INVASIVE- Direct lateral fusion with posterior pedicle screws: Lateral LEFT L3-4 and L4-5; Posterior RIGHT MIS TLIF L5-S1 (Right)   1 Day Post-Op     Interval History: Patient and wife states he did not sleep well throughout the night due to increased pain. During transfer today wife stated they noticed significant bleeding to the dressing and pad underneath the patient. Nurse applied pressure and dressing changed. Drainage noted to dressing during exam however no active drainage noted and incision intact with sutures. Reports LLE anterolateral throbbing pain. Denies RLE pain. Ambulating with walker. Voiding spontaneously.     Medications:  Continuous Infusions:   sodium chloride 0.9% Stopped (08/07/19 1700)    sodium chloride 0.9%      lactated ringers       Scheduled Meds:   bisacodyl  10 mg Rectal Daily    docusate  100 mg Oral Daily    famotidine  20 mg Oral BID    flecainide  300 mg  "Oral Daily    gabapentin  600 mg Oral TID    heparin (porcine)  5,000 Units Subcutaneous Q8H    metoprolol succinate  25 mg Oral Daily    mupirocin  1 g Nasal BID     PRN Meds:acetaminophen, aluminum-magnesium hydroxide-simethicone, diazePAM, morphine, mupirocin, ondansetron, oxyCODONE-acetaminophen, promethazine (PHENERGAN) IVPB, senna-docusate 8.6-50 mg       Objective:     Weight: 100.7 kg (222 lb)  Body mass index is 29.29 kg/m².  Vital Signs (Most Recent):  Temp: 98 °F (36.7 °C) (08/08/19 1516)  Pulse: 64 (08/08/19 1516)  Resp: 18 (08/08/19 1516)  BP: 135/69 (08/08/19 1516)  SpO2: (!) 94 % (08/08/19 1516) Vital Signs (24h Range):  Temp:  [96.7 °F (35.9 °C)-98.1 °F (36.7 °C)] 98 °F (36.7 °C)  Pulse:  [57-67] 64  Resp:  [11-20] 18  SpO2:  [94 %-100 %] 94 %  BP: (108-148)/(55-86) 135/69                          Urethral Catheter 08/07/19 0850 Non-latex;Straight-tip 16 Fr. (Active)   Site Assessment Clean;Intact 8/7/2019  8:15 PM   Collection Container Urimeter 8/7/2019  8:15 PM   Securement Method secured to top of thigh w/ adhesive device 8/7/2019  8:15 PM   Reason for Continuing Urinary Catheterization Post operative 8/7/2019  8:15 PM   CAUTI Prevention Bundle StatLock in place w 1" slack 8/7/2019  8:15 PM   Output (mL) 300 mL 8/7/2019  8:15 PM       Neurosurgery Physical Exam    General: well developed, well nourished, no distress.   Head: normocephalic, atraumatic  Neurologic: Alert and oriented. Thought content appropriate.  GCS: Motor: 6/Verbal: 5/Eyes: 4 GCS Total: 15  Mental Status: Awake, Alert, Oriented x 4  Language: No aphasia  Speech: No dysarthria  Cranial nerves: face symmetric, tongue midline, CN II-XII grossly intact.   Eyes: pupils equal, round, reactive to light with accomodation, EOMI.   Pulmonary: normal respirations, no signs of respiratory distress  Abdomen: soft, non-distended, not tender to palpation  Sensory: intact to light touch throughout  Motor Strength: Moves all extremities " spontaneously with good tone.  Pain limited weakness to left HF otherwise full strength upper and lower extremities. No abnormal movements seen.     Strength  Deltoids Triceps Biceps Wrist Extension Wrist Flexion Hand    Upper: R 5/5 5/5 5/5 5/5 5/5 5/5    L 5/5 5/5 5/5 5/5 5/5 5/5     Iliopsoas Quadriceps Knee  Flexion Tibialis  anterior Gastro- cnemius EHL   Lower: R 5/5 5/5 5/5 5/5 5/5 5/5    L 4-/5 5/5 5/5 5/5 5/5 5/5     Forman: absent  Clonus: absent  Babinski: absent  Pulses: 2+ and symmetric radial and dorsalis pedis.  Skin: Skin is warm, dry and intact.    Incisions c/d/i with sutures approximating skin edges. No surrounding erythema or edema. No active drainage from incision. NTTP.     LSO brace in place.      Significant Labs:  No results for input(s): GLU, NA, K, CL, CO2, BUN, CREATININE, CALCIUM, MG in the last 48 hours.  Recent Labs   Lab 08/07/19  1611   WBC 13.11*   HGB 12.7*   HCT 39.1*        No results for input(s): LABPT, INR, APTT in the last 48 hours.  Microbiology Results (last 7 days)     ** No results found for the last 168 hours. **        Recent Lab Results     None        All pertinent labs from the last 24 hours have been reviewed.    Significant Diagnostics:  I have reviewed all pertinent imaging results/findings within the past 24 hours.    Assessment/Plan:     Spinal stenosis  69M w/ multilevel lumbar spondylosis w/ spinal stenosis now s/p L3-4, L4-5 XLIF, L5-S1 TLIF, L4-5 laminectomy, and L3-S1 posterior instrumentation on 8/8.    - Doing well postoperatively. Neurologically stable.   - Pain limited weakness noted to L HF due to anterolateral LLE pain. Will ctm. Encourage use of prn medication.  - Norco changed to Percocet due to poor pain control. Continue valium and IV morphine prn for breakthrough pain.  - Dressing changed during exam. Leave dressing to incision. Monitor for drainage.   - Post op imaging satisfactory alignment of hardware.   - LSO brace when up/OOB.  -  Continue PT/OT/OOB. OOB > 6hrs/day  - GI: Diet as tolerated. Continue bowel regimen.   - Voiding spontaneously  - DVT ppx: SQH, Compression stockings, SCDs  - Atelectasis ppx: IS at bedside. Encourage use every hour while awake.    - Dispo: PT/OT rec HH therapy. Poor pain control overnight. Patient requiring IV pain medication. Continue to monitor overnight and plan for discharge tomorrow pending medical stability postoperative.        Nimisha Soto PA-C  Neurosurgery  [facility]

## 2019-08-08 NOTE — PLAN OF CARE
Problem: Adult Inpatient Plan of Care  Goal: Plan of Care Review  Outcome: Ongoing (interventions implemented as appropriate)  POC reviewed with patient.  Patient verbalized understanding.  Patient c/o of post surgery pain with PRN meds given OTC.  PRN meds have been effective.  No acute changes with no distress noted.  Please see flowsheet for full assessment.  Patient is resting in locked recliner and call light within reach.  Significant other at bedside.  TM

## 2019-08-08 NOTE — ASSESSMENT & PLAN NOTE
69M w/ multilevel lumbar spondylosis w/ spinal stenosis now s/p L3-4, L4-5 XLIF, L5-S1 TLIF, L4-5 laminectomy, and L3-S1 posterior instrumentation on 8/8.    - Doing well postoperatively. Neurologically stable.   - Pain limited weakness noted to L HF due to anterolateral LLE pain. Will ctm. Encourage use of prn medication.  - Norco changed to Percocet due to poor pain control. Continue valium and IV morphine prn for breakthrough pain.  - Dressing changed during exam. Leave dressing to incision. Monitor for drainage.   - Post op imaging satisfactory alignment of hardware.   - LSO brace when up/OOB.  - Continue PT/OT/OOB. OOB > 6hrs/day  - GI: Diet as tolerated. Continue bowel regimen.   - Voiding spontaneously  - DVT ppx: SQH, Compression stockings, SCDs  - Atelectasis ppx: IS at bedside. Encourage use every hour while awake.    - Dispo: PT/OT rec HH therapy. Poor pain control overnight. Patient requiring IV pain medication. Continue to monitor overnight and plan for discharge tomorrow pending medical stability postoperative.

## 2019-08-08 NOTE — NURSING TRANSFER
Nursing Transfer Note      8/7/2019     Transfer To: [id number]    Transfer via stretcher    Transfer with O2    Transported by Transport    Medicines sent: N/A    Chart send with patient: Yes    Notified: spouse    Patient reassessed at: 8/7/2019

## 2019-08-08 NOTE — PLAN OF CARE
CM met with patient and spouse  to obtain discharge planning assessment.  Patient is POD 1 for minimally invasive fusion.  Planned discharge is home with family with home health - Plan (A) or home with family - Plan (B).    PCP:  Loyd Garcia MD     Payor:  Payor: BLUE CROSS [employer] BENEFIT / Plan: BLUE [employer]OSS [employer] EM[employer] / Product Type: Self Funded /      Pharmacy:    [facility]  [address]  [place] [address]  Phone: [phone] Fax: [phone]    [facility]  [address]  [place] [address]  Phone: [phone] Fax: [phone]    [facility]  [address] [place] [address]  Phone: [phone] Fax: [phone]    [facility]  [address]  [facility] [place] [address]  Phone: [phone] Fax: [phone]       08/08/19 1338   Discharge Assessment   Assessment Type Discharge Planning Assessment   Confirmed/corrected address and phone number on facesheet? Yes   Assessment information obtained from? Patient   Expected Length of Stay (days) 2   Communicated expected length of stay with patient/caregiver yes   Prior to hospitilization cognitive status: Alert/Oriented   Prior to hospitalization functional status: Independent   Current cognitive status: Alert/Oriented   Current Functional Status: Independent   Lives With spouse   Able to Return to Prior Arrangements yes   Is patient able to care for self after discharge? Yes   Who are your caregiver(s) and their phone number(s)? Joyce Buchanan - spouse [phone]   Patient's perception of discharge disposition home health   Readmission Within the Last 30 Days no previous admission in last 30 days   Patient currently being followed by outpatient case management? No   Patient currently receives any other outside agency services? No   Equipment Currently Used at Home none   Do you have any problems affording any of your prescribed medications? No   Is the  patient taking medications as prescribed? yes   Does the patient have transportation home? Yes   Transportation Anticipated family or friend will provide   Does the patient receive services at the [facility]? No   Discharge Plan A Home with family;Home Health   Discharge Plan B Home with family   DME Needed Upon Discharge  none   Patient/Family in Agreement with Plan yes

## 2019-08-08 NOTE — ANESTHESIA RELEASE NOTE
"Anesthesia Release from PACU Note    Patient: Franko Buchanan MD    Procedure(s) Performed: Procedure(s) (LRB):  FUSION, SPINE, MINIMALLY INVASIVE- Direct lateral fusion with posterior pedicle screws: Lateral LEFT L3-4 and L4-5; Posterior RIGHT MIS TLIF L5-S1 (Right)    Anesthesia type: general    Post pain: Adequate analgesia    Post assessment: no apparent anesthetic complications    Last Vitals:   Visit Vitals  /72 (BP Location: Right arm, Patient Position: Lying)   Pulse 61   Temp 36.4 °C (97.6 °F) (Axillary)   Resp 16   Ht 6' 1" (1.854 m)   Wt 100.7 kg (222 lb)   SpO2 99%   BMI 29.29 kg/m²       Post vital signs: stable    Level of consciousness: awake and alert     Nausea/Vomiting: no nausea/no vomiting    Complications: none    Airway Patency: patent    Respiratory: unassisted, spontaneous ventilation    Cardiovascular: stable and blood pressure at baseline    Hydration: euvolemic  "

## 2019-08-08 NOTE — PLAN OF CARE
Problem: Physical Therapy Goal  Goal: Physical Therapy Goal  Goals to be met by: 2019 (10 days)     Patient will increase functional independence with mobility by performin. Supine to sit with Modified [place]  2. Sit to supine with Modified [place]  3. Sit to stand transfer with Modified [place]  4. Gait  x 150 feet with Modified [place] using least restrictive device or no AD.   5. Ascend/descend 3 stair with right Handrails Stand-by Assistance using least restrictive device or no AD.   6. Lower extremity exercise program x20 reps per handout, with independence to improve strength and activity tolerance.    Outcome: Ongoing (interventions implemented as appropriate)  Evaluation completed, initiated plan of care.   Jody Aly, PT  2019

## 2019-08-08 NOTE — PLAN OF CARE
Vital signs are stable and within normal limit. Surgical site is clean, dry and intact-dressing in place. Patient is awake and oriented x4. Pain is controlled, denies ponv.

## 2019-08-08 NOTE — ANESTHESIA POSTPROCEDURE EVALUATION
Anesthesia Post Evaluation    Patient: Franko Buchanan MD    Procedure(s) Performed: Procedure(s) (LRB):  FUSION, SPINE, MINIMALLY INVASIVE- Direct lateral fusion with posterior pedicle screws: Lateral LEFT L3-4 and L4-5; Posterior RIGHT MIS TLIF L5-S1 (Right)    Final Anesthesia Type: general  Patient location during evaluation: PACU  Patient participation: Yes- Able to Participate  Level of consciousness: awake and alert and oriented  Post-procedure vital signs: reviewed and stable  Pain management: adequate  Airway patency: patent  PONV status at discharge: No PONV  Anesthetic complications: no      Cardiovascular status: blood pressure returned to baseline  Respiratory status: unassisted and spontaneous ventilation  Hydration status: euvolemic  Follow-up not needed.          Vitals Value Taken Time   /72 8/7/2019  8:33 PM   Temp 36.4 °C (97.6 °F) 8/7/2019  7:30 PM   Pulse 69 8/7/2019  8:50 PM   Resp 16 8/7/2019  8:30 PM   SpO2 96 % 8/7/2019  8:50 PM   Vitals shown include unvalidated device data.      No case tracking events are documented in the log.      Pain/Conor Score: Pain Rating Prior to Med Admin: 5 (8/7/2019  6:05 PM)  Pain Rating Post Med Admin: 5 (8/7/2019  6:05 PM)  Conor Score: 9 (8/7/2019  8:15 PM)

## 2019-08-08 NOTE — PLAN OF CARE
Problem: Occupational Therapy Goal  Goal: Occupational Therapy Goal  Goals to be met by: 8/18/19    Patient will increase functional independence with ADLs by performing:    UE Dressing with Modified [place].  LE Dressing with Modified [place] with AE as needed.  Toileting from toilet with Modified [place] for hygiene and clothing management.   Supine to sit with Modified [place] with HOB flat.  Step transfer with Supervision and AD as needed to prepare for household mobility upon returning home.  Toilet transfer to toilet with Supervision.    Outcome: Ongoing (interventions implemented as appropriate)  OT POC implemented. OT recommending home with home health when pt is medically stable to further maximize pt's functional independence. While in house, pt will benefit from skilled OT 3x/wk for education, ADL re-training, and strength building to return to PLOF.     Comments: Lora Oconnor OTR/L

## 2019-08-08 NOTE — HOSPITAL COURSE
8/8: Patient and wife states he did not sleep well throughout the night due to increased pain. During transfer today wife stated they noticed significant bleeding to the dressing and pad underneath the patient. Nurse applied pressure and dressing changed. Drainage noted to dressing during exam however no active drainage noted and incision intact with sutures. Reports LLE anterolateral throbbing pain. Denies RLE pain. Ambulating with walker. Voiding spontaneously.   8/9: NAEON. Patient reports sleeping well overnight. Continued pain in LLE anterior thigh with movement. Minimal drainage from left lumbar incision overnight. No active bleeding. Denies weakness, paresthesias, bladder or bowel incontinence. Voiding spontaneously.

## 2019-08-09 VITALS
TEMPERATURE: 97 F | BODY MASS INDEX: 29.42 KG/M2 | OXYGEN SATURATION: 95 % | RESPIRATION RATE: 16 BRPM | SYSTOLIC BLOOD PRESSURE: 128 MMHG | DIASTOLIC BLOOD PRESSURE: 69 MMHG | HEART RATE: 61 BPM | HEIGHT: 73 IN | WEIGHT: 222 LBS

## 2019-08-09 PROCEDURE — 25000003 PHARM REV CODE 250: Performed by: STUDENT IN AN ORGANIZED HEALTH CARE EDUCATION/TRAINING PROGRAM

## 2019-08-09 PROCEDURE — 99024 PR POST-OP FOLLOW-UP VISIT: ICD-10-PCS | Mod: ,,, | Performed by: PHYSICIAN ASSISTANT

## 2019-08-09 PROCEDURE — 97530 THERAPEUTIC ACTIVITIES: CPT

## 2019-08-09 PROCEDURE — 99024 POSTOP FOLLOW-UP VISIT: CPT | Mod: ,,, | Performed by: PHYSICIAN ASSISTANT

## 2019-08-09 PROCEDURE — 63600175 PHARM REV CODE 636 W HCPCS: Performed by: STUDENT IN AN ORGANIZED HEALTH CARE EDUCATION/TRAINING PROGRAM

## 2019-08-09 PROCEDURE — 97116 GAIT TRAINING THERAPY: CPT

## 2019-08-09 RX ORDER — MORPHINE SULFATE 15 MG/1
15 TABLET, FILM COATED, EXTENDED RELEASE ORAL 2 TIMES DAILY
Qty: 14 TABLET | Refills: 0 | Status: SHIPPED | OUTPATIENT
Start: 2019-08-09 | End: 2019-08-16

## 2019-08-09 RX ORDER — DIAZEPAM 5 MG/1
5 TABLET ORAL EVERY 6 HOURS PRN
Qty: 30 TABLET | Refills: 0 | Status: SHIPPED | OUTPATIENT
Start: 2019-08-09 | End: 2019-08-22 | Stop reason: SDUPTHER

## 2019-08-09 RX ORDER — OXYCODONE AND ACETAMINOPHEN 10; 325 MG/1; MG/1
1 TABLET ORAL EVERY 4 HOURS PRN
Qty: 40 TABLET | Refills: 0 | Status: SHIPPED | OUTPATIENT
Start: 2019-08-09 | End: 2019-08-16

## 2019-08-09 RX ORDER — OXYCODONE AND ACETAMINOPHEN 10; 325 MG/1; MG/1
1 TABLET ORAL EVERY 4 HOURS PRN
Qty: 30 TABLET | Refills: 0 | Status: SHIPPED | OUTPATIENT
Start: 2019-08-17 | End: 2019-08-22 | Stop reason: SDUPTHER

## 2019-08-09 RX ORDER — GABAPENTIN 300 MG/1
600 CAPSULE ORAL 3 TIMES DAILY
Qty: 180 CAPSULE | Refills: 3 | Status: SHIPPED | OUTPATIENT
Start: 2019-08-09 | End: 2019-09-10 | Stop reason: SDUPTHER

## 2019-08-09 RX ADMIN — DIAZEPAM 5 MG: 5 TABLET ORAL at 03:08

## 2019-08-09 RX ADMIN — OXYCODONE HYDROCHLORIDE AND ACETAMINOPHEN 1 TABLET: 10; 325 TABLET ORAL at 01:08

## 2019-08-09 RX ADMIN — GABAPENTIN 600 MG: 300 CAPSULE ORAL at 08:08

## 2019-08-09 RX ADMIN — FAMOTIDINE 20 MG: 20 TABLET, FILM COATED ORAL at 08:08

## 2019-08-09 RX ADMIN — HEPARIN SODIUM 5000 UNITS: 5000 INJECTION, SOLUTION INTRAVENOUS; SUBCUTANEOUS at 05:08

## 2019-08-09 RX ADMIN — DIAZEPAM 5 MG: 5 TABLET ORAL at 01:08

## 2019-08-09 RX ADMIN — HEPARIN SODIUM 5000 UNITS: 5000 INJECTION, SOLUTION INTRAVENOUS; SUBCUTANEOUS at 02:08

## 2019-08-09 RX ADMIN — OXYCODONE HYDROCHLORIDE AND ACETAMINOPHEN 1 TABLET: 10; 325 TABLET ORAL at 03:08

## 2019-08-09 RX ADMIN — DOCUSATE SODIUM 100 MG: 50 LIQUID ORAL at 08:08

## 2019-08-09 RX ADMIN — GABAPENTIN 600 MG: 300 CAPSULE ORAL at 02:08

## 2019-08-09 RX ADMIN — OXYCODONE HYDROCHLORIDE AND ACETAMINOPHEN 1 TABLET: 10; 325 TABLET ORAL at 08:08

## 2019-08-09 RX ADMIN — METOPROLOL SUCCINATE 25 MG: 25 TABLET, EXTENDED RELEASE ORAL at 08:08

## 2019-08-09 NOTE — PLAN OF CARE
Problem: Physical Therapy Goal  Goal: Physical Therapy Goal  Goals to be met by: 2019 (10 days)     Patient will increase functional independence with mobility by performin. Supine to sit with Modified [place]  2. Sit to supine with Modified [place]  3. Sit to stand transfer with Modified [place]  4. Gait  x 150 feet with Modified [place] using least restrictive device or no AD.   5. Ascend/descend 3 stair with right Handrails Stand-by Assistance using least restrictive device or no AD.   6. Lower extremity exercise program x20 reps per handout, with independence to improve strength and activity tolerance.     Outcome: Ongoing (interventions implemented as appropriate)  goals remain appropriate

## 2019-08-09 NOTE — PLAN OF CARE
Patient to be discharged home.  The patient will have home health upon discharge.  The patient will also be provided with a rolling walker and a bsc.  Family to provide transportation home.  Neurosurgery clinic to schedule follow up appointment.    Future Appointments   Date Time Provider Department Center   8/22/2019 10:00 AM [facility]an [facility]   9/10/2019 10:30 AM BAP XROP DR ELADIO AGOSTOAY OP [facility]   9/10/2019 11:30 AM José Miguel Hitchcock MD [facility]   9/16/2019  3:15 PM Oswald Rangel OD [facility] OPTOMTY [place]   9/25/2019  7:30 AM EKG, APPT [facility] EKG [facility]   9/30/2019  1:00 PM Real Simon MD [facility] ARRHYTH [facility]        08/09/19 1506   Final Note   Assessment Type Final Discharge Note   Anticipated Discharge Disposition Home-Health   Hospital Follow Up  Appt(s) scheduled?   (Neurosurgery clinic to schedule follow up appointment.)   Discharge plans and expectations educations in teach back method with documentation complete? Yes

## 2019-08-09 NOTE — ASSESSMENT & PLAN NOTE
69M w/ multilevel lumbar spondylosis w/ spinal stenosis now s/p L3-4, L4-5 XLIF, L5-S1 TLIF, L4-5 laminectomy, and L3-S1 posterior instrumentation on 8/8.    - Doing well postoperatively. Neurologically stable.   - Pain limited weakness noted to L HF due to anterolateral LLE pain. Will ctm. Encourage use of prn medication.  - Better pain control today. Continue percocet and valium prn. Will provide prescriptions at discharge. Prescription also given for MS Contin 15mg BID for 7 days.  - Leave incision open to air. Bacitracin BID. Monitor for drainage.   - Post op imaging satisfactory alignment of hardware.   - LSO brace when up/OOB.  - Continue PT/OT/OOB. OOB > 6hrs/day  - GI: Diet as tolerated. Continue bowel regimen.   - Voiding spontaneously  - DVT ppx: SQH, Compression stockings, SCDs  - Atelectasis ppx: IS at bedside. Encourage use every hour while awake.    - Dispo: PT/OT rec HH therapy. Orders placed. Medically stable for discharge home today. All questions answered. Encouraged to call the clinic with questions/concerns prior to next visit.

## 2019-08-09 NOTE — PLAN OF CARE
ANJELICA following for DC needs. ANJELICA in communication with [facility].    ANJELICA sent referral to [facility] via [employer].      08/09/19 1246   Post-Acute Status   Post-Acute Authorization Home Health/Hospice   Home Health/Hospice Status Referrals Sent     Whitney Strickland LMSW  [facility]  [id number]

## 2019-08-09 NOTE — SUBJECTIVE & OBJECTIVE
Interval History: NAEON. Patient reports sleeping well overnight. Continued pain in LLE anterior thigh with movement. Minimal drainage from left lumbar incision overnight. No active bleeding. Denies weakness, paresthesias, bladder or bowel incontinence. Voiding spontaneously.     Medications:  Continuous Infusions:   sodium chloride 0.9% Stopped (08/07/19 1700)    sodium chloride 0.9%      lactated ringers       Scheduled Meds:   bisacodyl  10 mg Rectal Daily    docusate  100 mg Oral Daily    famotidine  20 mg Oral BID    flecainide  300 mg Oral Daily    gabapentin  600 mg Oral TID    heparin (porcine)  5,000 Units Subcutaneous Q8H    metoprolol succinate  25 mg Oral Daily    mupirocin  1 g Nasal BID     PRN Meds:acetaminophen, aluminum-magnesium hydroxide-simethicone, diazePAM, morphine, mupirocin, ondansetron, oxyCODONE-acetaminophen, promethazine (PHENERGAN) IVPB, senna-docusate 8.6-50 mg       Objective:     Weight: 100.7 kg (222 lb)  Body mass index is 29.29 kg/m².  Vital Signs (Most Recent):  Temp: 97.4 °F (36.3 °C) (08/09/19 1147)  Pulse: 61 (08/09/19 1147)  Resp: 16 (08/09/19 1147)  BP: 128/69 (08/09/19 1147)  SpO2: 95 % (08/09/19 1147) Vital Signs (24h Range):  Temp:  [97.3 °F (36.3 °C)-98.6 °F (37 °C)] 97.4 °F (36.3 °C)  Pulse:  [59-64] 61  Resp:  [14-18] 16  SpO2:  [92 %-98 %] 95 %  BP: (115-139)/(56-69) 128/69                   Neurosurgery Physical Exam    General: well developed, well nourished, no distress.   Head: normocephalic, atraumatic  Neurologic: Alert and oriented. Thought content appropriate.  GCS: Motor: 6/Verbal: 5/Eyes: 4 GCS Total: 15  Mental Status: Awake, Alert, Oriented x 4  Language: No aphasia  Speech: No dysarthria  Cranial nerves: face symmetric, tongue midline, CN II-XII grossly intact.   Eyes: pupils equal, round, reactive to light with accomodation, EOMI.   Pulmonary: normal respirations, no signs of respiratory distress  Abdomen: soft, non-distended, not tender to  palpation  Sensory: intact to light touch throughout  Motor Strength: Moves all extremities spontaneously with good tone.  Pain limited weakness to left HF otherwise full strength upper and lower extremities. No abnormal movements seen.      Strength   Deltoids Triceps Biceps Wrist Extension Wrist Flexion Hand    Upper: R 5/5 5/5 5/5 5/5 5/5 5/5     L 5/5 5/5 5/5 5/5 5/5 5/5       Iliopsoas Quadriceps Knee  Flexion Tibialis  anterior Gastro- cnemius EHL   Lower: R 5/5 5/5 5/5 5/5 5/5 5/5     L 4-/5 5/5 5/5 5/5 5/5 5/5      Forman: absent  Clonus: absent  Babinski: absent  Pulses: 2+ and symmetric radial and dorsalis pedis.  Skin: Skin is warm, dry and intact.     Incisions c/d/i with sutures approximating skin edges. No surrounding erythema or edema. No active drainage from incision. NTTP.       Significant Labs:  No results for input(s): GLU, NA, K, CL, CO2, BUN, CREATININE, CALCIUM, MG in the last 48 hours.  Recent Labs   Lab 08/07/19  1611   WBC 13.11*   HGB 12.7*   HCT 39.1*        No results for input(s): LABPT, INR, APTT in the last 48 hours.  Microbiology Results (last 7 days)     ** No results found for the last 168 hours. **        Recent Lab Results     None        All pertinent labs from the last 24 hours have been reviewed.    Significant Diagnostics:  I have reviewed all pertinent imaging results/findings within the past 24 hours.

## 2019-08-09 NOTE — PLAN OF CARE
[facility] accepted the patient.      08/09/19 1421   Post-Acute Status   Post-Acute Authorization Home Health/Hospice   Home Health/Hospice Status Set-up Complete     Whitney Strickland LMSW  [facility]  [id number]

## 2019-08-09 NOTE — PLAN OF CARE
Problem: Occupational Therapy Goal  Goal: Occupational Therapy Goal  Goals to be met by: 8/18/19    Patient will increase functional independence with ADLs by performing:    UE Dressing with Modified [place].  LE Dressing with Modified [place] with AE as needed.  Toileting from toilet with Modified [place] for hygiene and clothing management.   Supine to sit with Modified [place] with HOB flat.   Step transfer with Supervision and AD as needed to prepare for household mobility upon returning home.- progressing   Toilet transfer to toilet with Supervision.- progressing     Outcome: Ongoing (interventions implemented as appropriate)  Pt progressing well towards goals. OT POC remains appropriate for patient on this date. Pt will continue to benefit from skilled OT to address deficits affecting occupational performance.       Comments: Lora Oconnor OTR/L

## 2019-08-09 NOTE — PT/OT/SLP PROGRESS
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  He presents with the following impairments/functional limitations:  weakness, impaired endurance, impaired self care skills, impaired functional mobilty, gait instability, impaired balance, decreased lower extremity function, pain, orthopedic precautions Patient tolerated treatment well focusing on transfer gait and stair training. Patient able to ascend and descend 3 steps with CGA. Patient also able to ambulate a total of 157' from patients room to the stairwell and back with standing rest breaks about every 15 feet. Patient had mild lateral instability with 2 LOB requiring Min A to regain balance. Patient's unsteady gait 2* patient premedicated with pain medicine prior to session. Patient will continue to improve with skilled physical therapy services in order to return to functional baseline.    Rehab Prognosis: Good; patient would benefit from acute skilled PT services to address these deficits and reach maximum level of function.    Recent Surgery: Procedure(s) (LRB):  FUSION, SPINE, MINIMALLY INVASIVE- Direct lateral fusion with posterior pedicle screws: Lateral LEFT L3-4 and L4-5; Posterior RIGHT MIS TLIF L5-S1 (Right) 2 Days Post-Op    Plan:     During this hospitalization, patient to be seen 3 x/week to address the identified rehab impairments via gait training, therapeutic activities, therapeutic exercises, neuromuscular re-education and progress toward the following goals:    · Plan of Care Expires:  09/08/19    Subjective     Chief Complaint: no c/o  Patient/Family Comments/goals: "I would like to go for a walk. This pain medicine hit just the " "right spots."  Pain/Comfort:  · Pain Rating 1: 0/10  · Location - Side 1: Bilateral  · Location - Orientation 1: lower  · Location 1: back  · Pain Addressed 1: Pre-medicate for activity, Reposition, Distraction  · Pain Rating Post-Intervention 1: 8/10      Objective:     Communicated with nursing prior to session.  Patient found up in chair with peripheral IV upon PT entry to room.     General Precautions: Standard, fall   Orthopedic Precautions:spinal precautions   Braces: LSO     Functional Mobility:  · Transfers:     · Sit to Stand:  supervision with rolling walker  · Gait: Ambulated a total of 157' [facility] and [facility] with standing rest breaks about every 15 feet. Rest breaks needed mostly due to pain in back. Patient had mild lateral instability with 2 LOB requiring Min A to regain balance. Patient's unsteady gait 2* patient premedicated with pain medicine prior to session. Patient required CGA for most of gait training.  · Balance: static standing SBA, dynamic standing CGA   · Stairs: 3 steps using BHR for support CGA, educated on technique, patient states he would feel safe with stairs upon d/c      AM-PAC 6 CLICK MOBILITY  Turning over in bed (including adjusting bedclothes, sheets and blankets)?: 3  Sitting down on and standing up from a chair with arms (e.g., wheelchair, bedside commode, etc.): 4  Moving from lying on back to sitting on the side of the bed?: 4  Moving to and from a bed to a chair (including a wheelchair)?: 4  Need to walk in hospital room?: 3  Climbing 3-5 steps with a railing?: 3  Basic Mobility Total Score: 21       Therapeutic Activities and Exercises:   Patient educated on spinal precautions. Patient unable to recall spinal precautions when questions, however was able to maintain them throughout session.    Patient left up in chair with call button in reach..    GOALS:   Multidisciplinary Problems     Physical Therapy Goals        Problem: Physical Therapy Goal "    Goal Priority Disciplines Outcome Goal Variances Interventions   Physical Therapy Goal     PT, PT/OT Ongoing (interventions implemented as appropriate)     Description:  Goals to be met by: 2019 (10 days)     Patient will increase functional independence with mobility by performin. Supine to sit with Modified [place]  2. Sit to supine with Modified [place]  3. Sit to stand transfer with Modified [place]  4. Gait  x 150 feet with Modified [place] using least restrictive device or no AD.   5. Ascend/descend 3 stair with right Handrails Stand-by Assistance using least restrictive device or no AD.   6. Lower extremity exercise program x20 reps per handout, with independence to improve strength and activity tolerance.                      Time Tracking:     PT Received On: 19  PT Start Time: 1045     PT Stop Time: 1102  PT Total Time (min): 17 min     Billable Minutes: Gait Training 17    Treatment Type: Treatment  PT/PTA: PTA     PTA Visit Number: 1     Arlen Morales, FRANCOISE  2019

## 2019-08-09 NOTE — PROGRESS NOTES
[facility]  Neurosurgery  Progress Note    Subjective:     History of Present Illness: 69M w/ multilevel lumbar spondylosis w/ spinal stenosis who presents for  L3-4, L4-5 XLIF, L5-S1 TLIF, L4-5 laminectomy, and L3-S1 posterior instrumentation. Patient complains of pain in the right hip and leg has been about two years and has gotten worse in the last 3-4 months.  Pain primarily in the right hip and some in the left hip.  Pain radiates to the right hip and buttock region with radiation down the right anterior and lateral leg to the top of the right foot.  Occasional back pain. Constant right hip pain and the pain in the right leg comes and goes.  Pain worse with standing and walking and better with laying down.  No left leg pain. Patient takes advil and occasional percocet. He was not able to get the caudal FAUZIA due to social reasons. Patient denies any recent accidents or trauma, no saddle anesthesias, and no bowel or bladder incontinence.    Post-Op Info:  Procedure(s) (LRB):  FUSION, SPINE, MINIMALLY INVASIVE- Direct lateral fusion with posterior pedicle screws: Lateral LEFT L3-4 and L4-5; Posterior RIGHT MIS TLIF L5-S1 (Right)   2 Days Post-Op     Interval History: NAEON. Patient reports sleeping well overnight. Continued pain in LLE anterior thigh with movement. Minimal drainage from left lumbar incision overnight. No active bleeding. Denies weakness, paresthesias, bladder or bowel incontinence. Voiding spontaneously.     Medications:  Continuous Infusions:   sodium chloride 0.9% Stopped (08/07/19 1700)    sodium chloride 0.9%      lactated ringers       Scheduled Meds:   bisacodyl  10 mg Rectal Daily    docusate  100 mg Oral Daily    famotidine  20 mg Oral BID    flecainide  300 mg Oral Daily    gabapentin  600 mg Oral TID    heparin (porcine)  5,000 Units Subcutaneous Q8H    metoprolol succinate  25 mg Oral Daily    mupirocin  1 g Nasal BID     PRN Meds:acetaminophen,  aluminum-magnesium hydroxide-simethicone, diazePAM, morphine, mupirocin, ondansetron, oxyCODONE-acetaminophen, promethazine (PHENERGAN) IVPB, senna-docusate 8.6-50 mg       Objective:     Weight: 100.7 kg (222 lb)  Body mass index is 29.29 kg/m².  Vital Signs (Most Recent):  Temp: 97.4 °F (36.3 °C) (08/09/19 1147)  Pulse: 61 (08/09/19 1147)  Resp: 16 (08/09/19 1147)  BP: 128/69 (08/09/19 1147)  SpO2: 95 % (08/09/19 1147) Vital Signs (24h Range):  Temp:  [97.3 °F (36.3 °C)-98.6 °F (37 °C)] 97.4 °F (36.3 °C)  Pulse:  [59-64] 61  Resp:  [14-18] 16  SpO2:  [92 %-98 %] 95 %  BP: (115-139)/(56-69) 128/69                   Neurosurgery Physical Exam    General: well developed, well nourished, no distress.   Head: normocephalic, atraumatic  Neurologic: Alert and oriented. Thought content appropriate.  GCS: Motor: 6/Verbal: 5/Eyes: 4 GCS Total: 15  Mental Status: Awake, Alert, Oriented x 4  Language: No aphasia  Speech: No dysarthria  Cranial nerves: face symmetric, tongue midline, CN II-XII grossly intact.   Eyes: pupils equal, round, reactive to light with accomodation, EOMI.   Pulmonary: normal respirations, no signs of respiratory distress  Abdomen: soft, non-distended, not tender to palpation  Sensory: intact to light touch throughout  Motor Strength: Moves all extremities spontaneously with good tone.  Pain limited weakness to left HF otherwise full strength upper and lower extremities. No abnormal movements seen.      Strength   Deltoids Triceps Biceps Wrist Extension Wrist Flexion Hand    Upper: R 5/5 5/5 5/5 5/5 5/5 5/5     L 5/5 5/5 5/5 5/5 5/5 5/5       Iliopsoas Quadriceps Knee  Flexion Tibialis  anterior Gastro- cnemius EHL   Lower: R 5/5 5/5 5/5 5/5 5/5 5/5     L 4-/5 5/5 5/5 5/5 5/5 5/5      Forman: absent  Clonus: absent  Babinski: absent  Pulses: 2+ and symmetric radial and dorsalis pedis.  Skin: Skin is warm, dry and intact.     Incisions c/d/i with sutures approximating skin edges. No surrounding  erythema or edema. No active drainage from incision. NTTP.       Significant Labs:  No results for input(s): GLU, NA, K, CL, CO2, BUN, CREATININE, CALCIUM, MG in the last 48 hours.  Recent Labs   Lab 08/07/19  1611   WBC 13.11*   HGB 12.7*   HCT 39.1*        No results for input(s): LABPT, INR, APTT in the last 48 hours.  Microbiology Results (last 7 days)     ** No results found for the last 168 hours. **        Recent Lab Results     None        All pertinent labs from the last 24 hours have been reviewed.    Significant Diagnostics:  I have reviewed all pertinent imaging results/findings within the past 24 hours.    Assessment/Plan:     Spinal stenosis  69M w/ multilevel lumbar spondylosis w/ spinal stenosis now s/p L3-4, L4-5 XLIF, L5-S1 TLIF, L4-5 laminectomy, and L3-S1 posterior instrumentation on 8/8.    - Doing well postoperatively. Neurologically stable.   - Pain limited weakness noted to L HF due to anterolateral LLE pain. Will ctm. Encourage use of prn medication.  - Better pain control today. Continue percocet and valium prn. Will provide prescriptions at discharge. Prescription also given for MS Contin 15mg BID for 7 days.  - Leave incision open to air. Bacitracin BID. Monitor for drainage.   - Post op imaging satisfactory alignment of hardware.   - LSO brace when up/OOB.  - Continue PT/OT/OOB. OOB > 6hrs/day  - GI: Diet as tolerated. Continue bowel regimen.   - Voiding spontaneously  - DVT ppx: SQH, Compression stockings, SCDs  - Atelectasis ppx: IS at bedside. Encourage use every hour while awake.    - Dispo: PT/OT rec HH therapy. Orders placed. Medically stable for discharge home today. All questions answered. Encouraged to call the clinic with questions/concerns prior to next visit.         Nimisha Soto PA-C  Neurosurgery  [facility]

## 2019-08-09 NOTE — PT/OT/SLP PROGRESS
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  He presents with the following erformance deficits affecting function are weakness, impaired endurance, pain, impaired self care skills, impaired balance, orthopedic precautions. Pt progressing well towards goals this date. Pt remains limited in activity due to L quad pain. Pt's wife present and active in POC. Pt's wife will be home 24/7 to assist with pt's ADLs/IADLs. Pt and spouse reported that pt will be discharging today; both verbalized no therapy questions or concerns. OT POC remains appropriate for patient on this date. Pt will continue to benefit from skilled OT to address deficits affecting occupational performance.     Rehab Prognosis:  Good; patient would benefit from acute skilled OT services to address these deficits and reach maximum level of function.       Plan:     Patient to be seen 3 x/week to address the above listed problems via self-care/home management, therapeutic activities, therapeutic exercises  · Plan of Care Expires: 09/06/19  · Plan of Care Reviewed with: patient, spouse    Subjective     Pain/Comfort:  · Pain Rating 1: (pt did not rate)  · Pain Addressed 1: Reposition, Distraction  · Pain Rating Post-Intervention 1: (pt did not rate; c/o upper L LE pain EOB and during activity)  · Pain Addressed 2: Cessation of Activity, Reposition, Nurse notified    Objective:     Communicated with: RN prior to session.  Patient found HOB elevated with peripheral IV upon OT entry to room.    General Precautions: Standard, fall   Orthopedic Precautions:spinal precautions   Braces: LSO      Occupational Performance:     Bed Mobility:    · Patient completed Rolling/Turning to Left with  contact guard assistance and with side rail  · Patient completed Rolling/Turning to Right with contact guard assistance and with side rail  · Patient completed Scooting/Bridging with moderate assistance for posterior positioning in bed   · Patient completed Supine to Sit with moderate assistance for BLE placement over bed  · Patient completed Sit to Supine with moderate assistance for BLE placement into bed      Functional Mobility/Transfers:  · Patient completed Sit <> Stand Transfer from bed with minimum assistance  with  rolling walker due to decreased force production   · Patient completed Toilet Transfer Step Transfer technique with minimum assistance with  rolling walker and grab bars  · Functional Mobility: Pt ambulated to and from the bathroom with CGA and RW 2/2 pain. Decreased pace noted    Activities of Daily Living:  · Upper Body Dressing: minimum assistance to place LSO over back to prevent twisting; pt adjusted straps and brace with SBA joselyn sitting balance   · Lower Body Dressing: maximal assistance to don BLE shoes with a  2/2 pain EOB  · Toileting: assistance provided by wife for urinal use; per pt's request       [facility] 6 Click ADL: 18    Treatment & Education:  - Role of OT/ OT POC  - Self care safety/ independence  - Functional transfer/ mobility safety  - Bed mobility safety using log roll technique  - Importance of sitting UIC  - Energy conservation techniques such as pacing and taking rest breaks as needed  - Importance of staying active upon returning home   - Spinal precautions maintained throughout the session without cueing   - DME needs re-assessed     Patient left HOB elevated with all lines intact, call button in reach, RN notified and RN and wife presentEducation:      GOALS:   Multidisciplinary Problems     Occupational Therapy Goals        Problem: Occupational Therapy Goal    Goal  Priority Disciplines Outcome Interventions   Occupational Therapy Goal     OT, PT/OT Ongoing (interventions implemented as appropriate)    Description:  Goals to be met by: 8/18/19    Patient will increase functional independence with ADLs by performing:    UE Dressing with Modified [place].  LE Dressing with Modified [place] with AE as needed.  Toileting from toilet with Modified [place] for hygiene and clothing management.   Supine to sit with Modified [place] with HOB flat.   Step transfer with Supervision and AD as needed to prepare for household mobility upon returning home.- progressing   Toilet transfer to toilet with Supervision.- progressing                       Time Tracking:     OT Date of Treatment: 08/09/19  OT Start Time: 1445  OT Stop Time: 1459  OT Total Time (min): 14 min    Billable Minutes:Therapeutic Activity 14    Lora Oconnor OT  8/9/2019

## 2019-08-09 NOTE — NURSING
Discharge instructions given to patient and wife-Sultana regarding prescriptions, follow-up appointments and neurosurgery precautions, they both verbalize understanding. No questions at this time. Prescriptions x 3 (Oxycodone, MS Contin x2) given to wife. VSS and WNL, patient appears to be in no distress at this time. PIV x2 removed. Wife will get prescriptions filled in house prior to discharge, after which they will be ready to go. WCYONATAN. Delfina Guo 3:17 PM 8/9/2019

## 2019-08-09 NOTE — DISCHARGE INSTRUCTIONS
Neurosurgery Discharge Instructions    Activity Restrictions:  [x]  Return to work will be determined on an individual basis.  [x]  No lifting greater than 10 pounds.  [x]  Avoid bending and twisting the area of your surgery more than 45 degrees from neutral position in any direction.  [x]  No driving or operating machinery:  [x]  until cleared by your surgeon.  [x]  while taking narcotic pain medications or muscle relaxants.  [x]  Wear your brace at all times except when lying in bed.  [x]  Increase ambulation over the next 2 weeks so that you are walking 2 miles per day at 2 weeks post-operatively.  [x]  Walk on paved surfaces only. It is okay to walk up and down stairs while holding onto a side rail.  [x]  No sexual activity for 2-3 weeks.    Discharge Medication/Follow-up:  [x]  Please refer to discharge medication reconciliation form.  [x]  Do not take ANY non-steroidal anti-inflammatory drugs (NSAIDS), including the following: ibuprofen, naprosyn, Aleve, Advil, Indocin, Mobic, or Celebrex for:  [x]  3 months  [x]  Prescriptions for appropriate medication will be given upon discharge.   [x]  Pain control:   Percocet          [x]  Muscle relaxer:   Valium                     [x]  Other:   Gabapentin         [x]  Take docusate (Colace 100 mg): take one capsule a day as needed for constipation. You can get this over the counter.  [x]  Follow-up appointment:  [x]  10-14 days post-op for wound check by physician assistant/nurse  [x]  4-6 weeks with MD:  [x]  with x-rays  [x]  An appointment will be mailed to you.    Wound Care:  [x]  No bandage required. Keep your incision open to the air.  [x]  You may shower on the 2nd day after your surgery. Have the force of water hit you opposite from the incision. Pat the incision dry after your shower; do not scrub the incision.  [x]  You cannot take a bath until 8 weeks after surgery.  [x]  Apply bacitracin to incision twice a day for 2 weeks.    Call your doctor or go to  the Emergency Room for any signs of infection, including: increased redness, drainage, pain, or fever (temperature ?101.5 for 24 hours). Call your doctor or go to the Emergency Room if there are any localized neurological changes; problems with speech, vision, numbness, tingling, weakness, or severe headache; or for other concerns.    Special Instructions:  [x]  No use of tobacco products.  [x]  Diet: Please eat a regular diet as tolerated.  []  Other diet:              Specific physician instructions:  Resume Aspirin on 8/14/2019         Physicians need 3 days' notice for pain medicine to be refilled. Pain medicine will only be refilled between 8 AM and 5 PM, Monday through Friday, due to Food and Drug Administration regulation of documentation.    If you have any questions about this form, please call [phone].    Form No. 43996 (Revised 10/31/2013)

## 2019-08-11 NOTE — DISCHARGE SUMMARY
[facility]  Neurosurgery  Discharge Summary      Patient Name: Franko Buchanan MD  MRN: [id number]  Admission Date: 8/7/2019  Hospital Length of Stay: 2 days  Discharge Date and Time: 8/9/2019  6:02 PM  Attending Physician: No att. providers found   Discharging Provider: Nimisha Soto PA-C  Primary Care Provider: Loyd Garcia MD    HPI:   69M w/ multilevel lumbar spondylosis w/ spinal stenosis who presents for  L3-4, L4-5 XLIF, L5-S1 TLIF, L4-5 laminectomy, and L3-S1 posterior instrumentation. Patient complains of pain in the right hip and leg has been about two years and has gotten worse in the last 3-4 months.  Pain primarily in the right hip and some in the left hip.  Pain radiates to the right hip and buttock region with radiation down the right anterior and lateral leg to the top of the right foot.  Occasional back pain. Constant right hip pain and the pain in the right leg comes and goes.  Pain worse with standing and walking and better with laying down.  No left leg pain. Patient takes advil and occasional percocet. He was not able to get the caudal FAUZIA due to social reasons. Patient denies any recent accidents or trauma, no saddle anesthesias, and no bowel or bladder incontinence.    Procedure(s) (LRB):  FUSION, SPINE, MINIMALLY INVASIVE- Direct lateral fusion with posterior pedicle screws: Lateral LEFT L3-4 and L4-5; Posterior RIGHT MIS TLIF L5-S1 (Right)     Hospital Course: 8/8: Patient and wife states he did not sleep well throughout the night due to increased pain. During transfer today wife stated they noticed significant bleeding to the dressing and pad underneath the patient. Nurse applied pressure and dressing changed. Drainage noted to dressing during exam however no active drainage noted and incision intact with sutures. Reports LLE anterolateral throbbing pain. Denies RLE pain. Ambulating with walker. Voiding spontaneously.   8/9: NAEON. Patient reports sleeping well  "overnight. Continued pain in LLE anterior thigh with movement. Minimal drainage from left lumbar incision overnight. No active bleeding. Denies weakness, paresthesias, bladder or bowel incontinence. Voiding spontaneously.     Patient medically stable for discharge home today. Pain controlled with PO medication. Discharge instructions given verbally and written provided in chart. All questions answered. Encouraged to call the clinic with questions/concerns prior to next appointment.     Consults: PT/OT    Significant Diagnostic Studies: Labs: BMP: No results for input(s): GLU, NA, K, CL, CO2, BUN, CREATININE, CALCIUM, MG in the last 48 hours. and CBC No results for input(s): WBC, HGB, HCT, PLT in the last 48 hours.  Radiology: XR Lumbar Spine    Pending Diagnostic Studies:     None        Final Active Diagnoses:    Diagnosis Date Noted POA    PRINCIPAL PROBLEM:  Spinal stenosis of lumbar region [M48.061] 08/07/2019 Yes    Spinal stenosis [M48.00] 08/06/2019 Unknown      Problems Resolved During this Admission:      Discharged Condition: good    Disposition: Home or Self Care    Follow Up:  Follow-up Information     [facility] [address] In 2 weeks.    Specialty:  Spine Services  Why:  For wound re-check  Contact information:  [address], [address]  [facility] [address] [phone]  Additional information:  [facility], 4th Floor, [address]               Patient Instructions:      COMMODE FOR HOME USE     Order Specific Question Answer Comments   Type: Standard    Height: 6' 1" (1.854 m)    Weight: 100.7 kg (222 lb)    Does patient have medical equipment at home? none    Length of need (1-99 months): 99    Vendor: [facility]    Expected Date of Delivery: 8/9/2019      WALKER FOR HOME USE     Order Specific Question Answer Comments   Type of Walker: Adult (5'4"-6'6")    With wheels? Yes    Height: 6' 1" (1.854 m)    Weight: 100.7 kg (222 lb)    Length of need (1-99 " months): 99    Does patient have medical equipment at home? none    Please check all that apply: Patient is unable to safely ambulate without equipment.    Vendor: [facility]    Expected Date of Delivery: 8/9/2019      Ambulatory referral to Home Health   Referral Priority: Routine Referral Type: Home Health   Referral Reason: Specialty Services Required   Requested Specialty: Home Health Services   Number of Visits Requested: 1     Notify your health care provider if you experience any of the following:  temperature >100.4     Notify your health care provider if you experience any of the following:  persistent nausea and vomiting or diarrhea     Notify your health care provider if you experience any of the following:  severe uncontrolled pain     Notify your health care provider if you experience any of the following:  redness, tenderness, or signs of infection (pain, swelling, redness, odor or green/yellow discharge around incision site)     Notify your health care provider if you experience any of the following:  difficulty breathing or increased cough     Notify your health care provider if you experience any of the following:  severe persistent headache     Notify your health care provider if you experience any of the following:  worsening rash     Notify your health care provider if you experience any of the following:  persistent dizziness, light-headedness, or visual disturbances     Notify your health care provider if you experience any of the following:  increased confusion or weakness     Activity as tolerated     Medications:  Reconciled Home Medications:      Medication List      START taking these medications    gabapentin 300 MG capsule  Commonly known as:  NEURONTIN  Take 2 capsules (600 mg total) by mouth 3 (three) times daily.     morphine 15 MG 12 hr tablet  Commonly known as:  MS CONTIN  Take 1 tablet (15 mg total) by mouth 2 (two) times daily. for 7 days        CHANGE how you take these  medications    diazePAM 5 MG tablet  Commonly known as:  VALIUM  Take 1 tablet (5 mg total) by mouth every 6 (six) hours as needed (muscle spasms).  What changed:  reasons to take this     * oxyCODONE-acetaminophen  mg per tablet  Commonly known as:  PERCOCET  Take 1 tablet by mouth every 4 (four) hours as needed.  What changed:  You were already taking a medication with the same name, and this prescription was added. Make sure you understand how and when to take each.     * oxyCODONE-acetaminophen  mg per tablet  Commonly known as:  PERCOCET  Take 1 tablet by mouth every 4 (four) hours as needed for Pain.  Start taking on:  8/17/2019  What changed:    · when to take this  · reasons to take this  · These instructions start on 8/17/2019. If you are unsure what to do until then, ask your doctor or other care provider.         * This list has 2 medication(s) that are the same as other medications prescribed for you. Read the directions carefully, and ask your doctor or other care provider to review them with you.            CONTINUE taking these medications    calcium carbonate 220 mg capsule  Take 250 mg by mouth 2 (two) times daily with meals.     CENTRUM SILVER ORAL  Take by mouth.     cholecalciferol (vitamin D3) 2,000 unit Cap  Commonly known as:  VITAMIN D3  Take 1 capsule by mouth once daily.     ferrous sulfate 325 (65 FE) MG EC tablet  Take 325 mg by mouth as needed.     flecainide 150 MG Tab  Commonly known as:  TAMBOCOR  Take 2 tablets (300 mg total) by mouth daily as needed.     ketoconazole 2 % cream  Commonly known as:  NIZORAL  Apply to the affected areas on under arms twice daily as needed for flare.     metoprolol succinate 25 MG 24 hr tablet  Commonly known as:  TOPROL-XL  Take 1 tablet (25 mg total) by mouth once daily.     SUPER EPA 1,000-5 mg-unit Cap  Generic drug:  omega-3 fatty acids-vitamin E  Take 1 capsule by mouth once daily.     triamcinolone acetonide 0.1% 0.1 % cream  Commonly  known as:  KENALOG  AAA bid        ASK your doctor about these medications    aspirin 325 MG tablet  Take 325 mg by mouth once daily.  Ok to resume 8/14          Nimisha Soto PA-C  Neurosurgery  [facility]

## 2019-08-15 ENCOUNTER — PATIENT MESSAGE (OUTPATIENT)
Dept: SPINE | Facility: CLINIC | Age: 69
End: 2019-08-15

## 2019-08-16 ENCOUNTER — TELEPHONE (OUTPATIENT)
Dept: SPINE | Facility: CLINIC | Age: 69
End: 2019-08-16

## 2019-08-16 NOTE — TELEPHONE ENCOUNTER
----- Message from Indira Tho sent at 8/16/2019  3:08 PM CDT -----  Contact: Self            Name of Who is Calling: YANE LAW MD [[id number]]      What is the request in detail:  Pt states he has an [facility] deadline and needs to speak to the clinical staff today. Pt states it is urgent. Please contact to further discuss and advise.        Can the clinic reply by [facility]: N      What Number to Call Back [facility] not [facility]: [phone]

## 2019-08-17 DIAGNOSIS — M50.30 DDD (DEGENERATIVE DISC DISEASE), CERVICAL: Primary | ICD-10-CM

## 2019-08-19 RX ORDER — METOPROLOL SUCCINATE 25 MG/1
25 TABLET, EXTENDED RELEASE ORAL DAILY
Qty: 90 TABLET | Refills: 3 | Status: SHIPPED | OUTPATIENT
Start: 2019-08-19 | End: 2020-03-16 | Stop reason: SDUPTHER

## 2019-08-22 ENCOUNTER — OFFICE VISIT (OUTPATIENT)
Dept: SPINE | Facility: CLINIC | Age: 69
End: 2019-08-22
Payer: COMMERCIAL

## 2019-08-22 VITALS
HEART RATE: 74 BPM | DIASTOLIC BLOOD PRESSURE: 79 MMHG | TEMPERATURE: 98 F | WEIGHT: 222 LBS | SYSTOLIC BLOOD PRESSURE: 150 MMHG | HEIGHT: 73 IN | BODY MASS INDEX: 29.42 KG/M2

## 2019-08-22 DIAGNOSIS — Z98.1 S/P LUMBAR SPINAL FUSION: Primary | ICD-10-CM

## 2019-08-22 PROCEDURE — 99024 PR POST-OP FOLLOW-UP VISIT: ICD-10-PCS | Mod: S$GLB,,, | Performed by: PHYSICIAN ASSISTANT

## 2019-08-22 PROCEDURE — 99999 PR PBB SHADOW E&M-EST. PATIENT-LVL IV: CPT | Mod: PBBFAC,,, | Performed by: PHYSICIAN ASSISTANT

## 2019-08-22 PROCEDURE — 99024 POSTOP FOLLOW-UP VISIT: CPT | Mod: S$GLB,,, | Performed by: PHYSICIAN ASSISTANT

## 2019-08-22 PROCEDURE — 99999 PR PBB SHADOW E&M-EST. PATIENT-LVL IV: ICD-10-PCS | Mod: PBBFAC,,, | Performed by: PHYSICIAN ASSISTANT

## 2019-08-22 RX ORDER — DIAZEPAM 5 MG/1
5 TABLET ORAL EVERY 6 HOURS PRN
Qty: 90 TABLET | Refills: 0 | Status: SHIPPED | OUTPATIENT
Start: 2019-08-22 | End: 2020-12-18

## 2019-08-22 RX ORDER — OXYCODONE AND ACETAMINOPHEN 10; 325 MG/1; MG/1
1 TABLET ORAL EVERY 4 HOURS PRN
Qty: 90 TABLET | Refills: 0 | Status: SHIPPED | OUTPATIENT
Start: 2019-08-24 | End: 2019-08-31

## 2019-08-22 RX ORDER — METHYLPREDNISOLONE 4 MG/1
TABLET ORAL
Qty: 21 TABLET | Refills: 0 | Status: SHIPPED | OUTPATIENT
Start: 2019-08-22

## 2019-08-22 NOTE — PROGRESS NOTES
"Postoperative Wound Check:    DATE OF PROCEDURE: 8/7/2019      PREOPERATIVE DIAGNOSES:   Spinal stenosis of lumbar region without neurogenic claudication [M48.061]     POSTOPERATIVE DIAGNOSES:   Spinal stenosis of lumbar region without neurogenic claudication [M48.061]     PROCEDURES PERFORMED:   Procedure(s) (LRB):  FUSION, SPINE, MINIMALLY INVASIVE- Direct lateral fusion with posterior pedicle screws: Lateral LEFT L3-4 and L4-5; Posterior RIGHT MIS TLIF L5-S1 (Right)     Surgeon(s) and Role:     * José Miguel Hitchcock MD - Primary  Resident- Abhinav Saldana MD    HPI:    Low back pain with pain in the anterior left leg to the knee and weakness in the left hip flexor.  Some pain in the right leg.    Patient denies and problems with the incisions.  No redness, swelling, or drainage, and no fever, chills, or sweats.      Physical Exam:    Vitals:    08/22/19 1024   BP: (!) 150/79   Pulse: 74   Temp: 98.3 °F (36.8 °C)   Weight: 100.7 kg (222 lb)   Height: 6' 1" (1.854 m)   PainSc:   7   PainLoc: Back         Incision:  Wound edges are approximated except for a slight opening of the lower right incision.  No redness, swelling, or drainage.      Diagnosis:     1. S/P lumbar spinal fusion              Plan:       Orders Placed This Encounter    oxyCODONE-acetaminophen (PERCOCET)  mg per tablet    diazePAM (VALIUM) 5 MG tablet    methylPREDNISolone (MEDROL DOSEPACK) 4 mg tablet       -Encouraged to keep walking  -Refilled Percocet and Valium  -Medrol pack now with food    The patient will follow up with Dr. Hitchcock in the next few weeks with xrays beforehand for the 6 week po fu.    Florence Payan, [facility], PA-C  [facility] Spine Center  [facility]      "

## 2019-09-10 ENCOUNTER — HOSPITAL ENCOUNTER (OUTPATIENT)
Dept: RADIOLOGY | Facility: OTHER | Age: 69
Discharge: HOME | End: 2019-09-10
Attending: NEUROLOGICAL SURGERY
Payer: COMMERCIAL

## 2019-09-10 ENCOUNTER — OFFICE VISIT (OUTPATIENT)
Dept: SPINE | Facility: CLINIC | Age: 69
End: 2019-09-10
Attending: NEUROLOGICAL SURGERY
Payer: COMMERCIAL

## 2019-09-10 VITALS
SYSTOLIC BLOOD PRESSURE: 124 MMHG | BODY MASS INDEX: 29.42 KG/M2 | DIASTOLIC BLOOD PRESSURE: 78 MMHG | HEART RATE: 54 BPM | HEIGHT: 73 IN | WEIGHT: 222 LBS

## 2019-09-10 DIAGNOSIS — M43.22 CERVICAL VERTEBRAL FUSION: ICD-10-CM

## 2019-09-10 DIAGNOSIS — Z98.1 S/P LUMBAR SPINAL FUSION: Primary | ICD-10-CM

## 2019-09-10 PROCEDURE — 99024 POSTOP FOLLOW-UP VISIT: CPT | Mod: S$GLB,,, | Performed by: NEUROLOGICAL SURGERY

## 2019-09-10 PROCEDURE — 99024 PR POST-OP FOLLOW-UP VISIT: ICD-10-PCS | Mod: S$GLB,,, | Performed by: NEUROLOGICAL SURGERY

## 2019-09-10 PROCEDURE — 72100 X-RAY EXAM L-S SPINE 2/3 VWS: CPT | Mod: 26,,, | Performed by: RADIOLOGY

## 2019-09-10 PROCEDURE — 72100 XR LUMBAR SPINE AP AND LAT WITH FLEX/EXT: ICD-10-PCS | Mod: 26,,, | Performed by: RADIOLOGY

## 2019-09-10 PROCEDURE — 72120 XR LUMBAR SPINE AP AND LAT WITH FLEX/EXT: ICD-10-PCS | Mod: 26,,, | Performed by: RADIOLOGY

## 2019-09-10 PROCEDURE — 72120 X-RAY BEND ONLY L-S SPINE: CPT | Mod: 26,,, | Performed by: RADIOLOGY

## 2019-09-10 PROCEDURE — 99999 PR PBB SHADOW E&M-EST. PATIENT-LVL III: ICD-10-PCS | Mod: PBBFAC,,, | Performed by: NEUROLOGICAL SURGERY

## 2019-09-10 PROCEDURE — 99213 OFFICE O/P EST LOW 20 MIN: CPT | Mod: PBBFAC,25 | Performed by: NEUROLOGICAL SURGERY

## 2019-09-10 PROCEDURE — 72100 X-RAY EXAM L-S SPINE 2/3 VWS: CPT | Mod: TC,FY

## 2019-09-10 PROCEDURE — 99999 PR PBB SHADOW E&M-EST. PATIENT-LVL III: CPT | Mod: PBBFAC,,, | Performed by: NEUROLOGICAL SURGERY

## 2019-09-10 RX ORDER — GABAPENTIN 300 MG/1
600 CAPSULE ORAL 3 TIMES DAILY
Qty: 180 CAPSULE | Refills: 3 | Status: SHIPPED | OUTPATIENT
Start: 2019-09-10 | End: 2020-12-18

## 2019-09-10 RX ORDER — OXYCODONE AND ACETAMINOPHEN 10; 325 MG/1; MG/1
1 TABLET ORAL EVERY 4 HOURS PRN
Qty: 90 TABLET | Refills: 0 | Status: SHIPPED | OUTPATIENT
Start: 2019-09-10 | End: 2019-10-14 | Stop reason: SDUPTHER

## 2019-09-10 NOTE — PROGRESS NOTES
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 He is s/p FUSION, SPINE, MINIMALLY INVASIVE- Direct lateral fusion with posterior pedicle screws: Lateral LEFT L3-4 and L4-5; Posterior RIGHT MIS TLIF L5-S1 (Right) that was done by Dr. Hitchcock on 8/7/2019. Currently, the patient's symptoms are better since surgery. The patient is complaining of pain in the lower back pain moving into the left hip and left thigh with associated paresthesias. He denies any new onset of weakness. The patient describes the pain as discomfort, aching and soreness. The patient rates the pain 0 on the pain scale.Post-op medications the patient is currently taking are: Percocet and Valium    ROS:    NAD    PE:    AAOX3  PERRL  EOMI    Lumbar incision:  C/D/I    ROM:   Decreased secondary to pain    Sensation:  Intact to light touch (All 4 extremities)  Decreased in left anterior thigh    Strength: Full strength 4+5      Deltoids Biceps Triceps Wrist Ext. Wrist Flex. Hand    RUE         LUE          Hip Flex. Knee Flex. Knee Ext. Dorsi Flex Plantar Flex EHL   RLE         LLE           Gait:  normal    DTR:  2+ and symmetric bilaterally    No abnormal reflexes    SLR- negative    IMAGING:    XRays: Lspine: L3-4 and L4-5 XLIF and L5-S1 TLIF with good placement of spacers and pedicle screws.     CT: none    MRI: none    ASSESSMENT:   Doing better post op but his progress is slow. 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     PLAN:   Follow up in my clinic in December, 2019.

## 2019-09-12 ENCOUNTER — PATIENT MESSAGE (OUTPATIENT)
Dept: ELECTROPHYSIOLOGY | Facility: CLINIC | Age: 69
End: 2019-09-12

## 2019-09-13 ENCOUNTER — TELEPHONE (OUTPATIENT)
Dept: SPINE | Facility: CLINIC | Age: 69
End: 2019-09-13

## 2019-09-13 NOTE — TELEPHONE ENCOUNTER
Spoke to patient and advised of Dr. Hitchcock has taken a JAZMIN and will not being seen patient in the clinic. Informed patient if he would like he can be referred to Dr. Clement or Dr. Rodríguez to establish care. Patient advised he wanted to do research on providers before making a decision. Understanding verbalized and expressed.

## 2019-09-13 NOTE — TELEPHONE ENCOUNTER
----- Message from Tmi López sent at 9/13/2019  9:38 AM CDT -----  Contact: YANE LAW MD [[id number]]   Name of Who is Calling: YANE LAW MD [[id number]]    What is the request in detail: patient request call back in reference to appointment  Please contact to further discuss and advise      Can the clinic reply by [facility]: no     What Number to Call Back if not in [facility]:  [phone]

## 2019-09-16 ENCOUNTER — OFFICE VISIT (OUTPATIENT)
Dept: OPTOMETRY | Facility: CLINIC | Age: 69
End: 2019-09-16
Payer: COMMERCIAL

## 2019-09-16 DIAGNOSIS — Z13.5 GLAUCOMA SCREENING: ICD-10-CM

## 2019-09-16 DIAGNOSIS — H35.363 DRUSEN OF MACULA OF BOTH EYES: ICD-10-CM

## 2019-09-16 DIAGNOSIS — H25.13 NUCLEAR SCLEROSIS, BILATERAL: ICD-10-CM

## 2019-09-16 DIAGNOSIS — H52.4 PRESBYOPIA: Primary | ICD-10-CM

## 2019-09-16 PROCEDURE — 92014 COMPRE OPH EXAM EST PT 1/>: CPT | Mod: S$GLB,,, | Performed by: OPTOMETRIST

## 2019-09-16 PROCEDURE — 92015 PR REFRACTION: ICD-10-PCS | Mod: S$GLB,,, | Performed by: OPTOMETRIST

## 2019-09-16 PROCEDURE — 92015 DETERMINE REFRACTIVE STATE: CPT | Mod: S$GLB,,, | Performed by: OPTOMETRIST

## 2019-09-16 PROCEDURE — 92014 PR EYE EXAM, EST PATIENT,COMPREHESV: ICD-10-PCS | Mod: S$GLB,,, | Performed by: OPTOMETRIST

## 2019-09-16 PROCEDURE — 99999 PR PBB SHADOW E&M-EST. PATIENT-LVL II: CPT | Mod: PBBFAC,,, | Performed by: OPTOMETRIST

## 2019-09-16 PROCEDURE — 99999 PR PBB SHADOW E&M-EST. PATIENT-LVL II: ICD-10-PCS | Mod: PBBFAC,,, | Performed by: OPTOMETRIST

## 2019-09-16 NOTE — PROGRESS NOTES
HPI     DLS: 7/3/18  Pt states no VA problems wears OTC readers +1.50, and RX glasses that he   does not wear.   No f/f  No gtts     Last edited by Liliana Flowers MA on 9/16/2019  3:15 PM. 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 (History)        Assessment /Plan     For exam results, see Encounter Report.    Presbyopia    Nuclear sclerosis, bilateral    Drusen of macula of both eyes    Glaucoma screening      1. Cat ou--pt happy w spex Rx  2. Mac drusen OU--stable.   Pt to cont w amsler grid use/vitamins  3. Sp focal laser for periph hole OD.  Stable  4. 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

## 2019-09-18 ENCOUNTER — PATIENT MESSAGE (OUTPATIENT)
Dept: SPINE | Facility: CLINIC | Age: 69
End: 2019-09-18

## 2019-09-18 NOTE — TELEPHONE ENCOUNTER
Dr. 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    Is it ok from your standpoint for me to write a letter like this for him and would you be available to sign it?    Thanks,  Florence

## 2019-09-19 ENCOUNTER — TELEPHONE (OUTPATIENT)
Dept: SPINE | Facility: CLINIC | Age: 69
End: 2019-09-19

## 2019-09-19 ENCOUNTER — PATIENT MESSAGE (OUTPATIENT)
Dept: SPINE | Facility: CLINIC | Age: 69
End: 2019-09-19

## 2019-09-19 NOTE — TELEPHONE ENCOUNTER
Please call patient and tell him Dr. Hitchcock did his letter and signed it.    Please get the letter to the patient.    Thanks,  Florence Payan, MEAGAN, PA-C  Neurosurgery  [facility]  [facility]

## 2019-09-19 NOTE — TELEPHONE ENCOUNTER
Called and spoke with patient.  Informed patient that the letter that he requested was ready.  He asked if we could mail it out to him.  Letter mailed per patient request.

## 2019-09-20 ENCOUNTER — PATIENT MESSAGE (OUTPATIENT)
Dept: FAMILY MEDICINE | Facility: CLINIC | Age: 69
End: 2019-09-20

## 2019-09-26 ENCOUNTER — PATIENT MESSAGE (OUTPATIENT)
Dept: FAMILY MEDICINE | Facility: CLINIC | Age: 69
End: 2019-09-26

## 2019-09-27 ENCOUNTER — PATIENT OUTREACH (OUTPATIENT)
Dept: ADMINISTRATIVE | Facility: OTHER | Age: 69
End: 2019-09-27

## 2019-09-27 NOTE — TELEPHONE ENCOUNTER
Spoke to patient.  Had written letter prior describing his residual disability issues and limitation to continue on with his practice.  Has a similar letter from neuro surgery from 09/19 describing his given his comorbidities of spinal degeneration and radiculopathy.  In the process of dealing with some insurance issues relating to upcoming divorce settlement and needs some of this information admitted to legal record for these purposes stating that he is unable to continue with his current practice as is given his medical issues.  He plans to eventually follow for long-term disability and does not plan on returning to work.  Is  Arnold Ramos is sending a letter describing with the specific needs are regarding telephone deposition versus and person appearance if possible.    Patient has follow-up appointment coming up on October 10th

## 2019-09-30 ENCOUNTER — OFFICE VISIT (OUTPATIENT)
Dept: ELECTROPHYSIOLOGY | Facility: CLINIC | Age: 69
End: 2019-09-30
Payer: COMMERCIAL

## 2019-09-30 ENCOUNTER — HOSPITAL ENCOUNTER (OUTPATIENT)
Dept: CARDIOLOGY | Facility: CLINIC | Age: 69
Discharge: HOME | End: 2019-09-30
Payer: COMMERCIAL

## 2019-09-30 VITALS
HEIGHT: 73 IN | WEIGHT: 221.31 LBS | HEART RATE: 54 BPM | DIASTOLIC BLOOD PRESSURE: 70 MMHG | SYSTOLIC BLOOD PRESSURE: 120 MMHG | BODY MASS INDEX: 29.33 KG/M2

## 2019-09-30 DIAGNOSIS — R00.1 BRADYCARDIA: ICD-10-CM

## 2019-09-30 DIAGNOSIS — M48.00 SPINAL STENOSIS, UNSPECIFIED SPINAL REGION: ICD-10-CM

## 2019-09-30 DIAGNOSIS — I48.0 PAROXYSMAL ATRIAL FIBRILLATION: Primary | ICD-10-CM

## 2019-09-30 DIAGNOSIS — R00.2 PALPITATIONS: ICD-10-CM

## 2019-09-30 DIAGNOSIS — G47.33 OSA ON CPAP: ICD-10-CM

## 2019-09-30 DIAGNOSIS — I48.0 PAROXYSMAL ATRIAL FIBRILLATION: ICD-10-CM

## 2019-09-30 PROCEDURE — 93005 RHYTHM STRIP: ICD-10-PCS | Mod: S$GLB,,, | Performed by: INTERNAL MEDICINE

## 2019-09-30 PROCEDURE — 93005 ELECTROCARDIOGRAM TRACING: CPT | Mod: S$GLB,,, | Performed by: INTERNAL MEDICINE

## 2019-09-30 PROCEDURE — 93010 ELECTROCARDIOGRAM REPORT: CPT | Mod: S$GLB,,, | Performed by: INTERNAL MEDICINE

## 2019-09-30 PROCEDURE — 93010 RHYTHM STRIP: ICD-10-PCS | Mod: S$GLB,,, | Performed by: INTERNAL MEDICINE

## 2019-09-30 PROCEDURE — 99999 PR PBB SHADOW E&M-EST. PATIENT-LVL III: CPT | Mod: PBBFAC,,, | Performed by: INTERNAL MEDICINE

## 2019-09-30 PROCEDURE — 93005 ELECTROCARDIOGRAM TRACING: CPT | Mod: PBBFAC | Performed by: INTERNAL MEDICINE

## 2019-09-30 PROCEDURE — 99999 PR PBB SHADOW E&M-EST. PATIENT-LVL III: ICD-10-PCS | Mod: PBBFAC,,, | Performed by: INTERNAL MEDICINE

## 2019-09-30 PROCEDURE — 99214 PR OFFICE/OUTPT VISIT, EST, LEVL IV, 30-39 MIN: ICD-10-PCS | Mod: S$GLB,,, | Performed by: INTERNAL MEDICINE

## 2019-09-30 PROCEDURE — 99214 OFFICE O/P EST MOD 30 MIN: CPT | Mod: S$GLB,,, | Performed by: INTERNAL MEDICINE

## 2019-09-30 PROCEDURE — 99213 OFFICE O/P EST LOW 20 MIN: CPT | Mod: PBBFAC | Performed by: INTERNAL MEDICINE

## 2019-09-30 RX ORDER — FLECAINIDE ACETATE 150 MG/1
300 TABLET ORAL DAILY PRN
Qty: 20 TABLET | Refills: 6 | Status: SHIPPED | OUTPATIENT
Start: 2019-09-30 | End: 2020-12-16 | Stop reason: SDUPTHER

## 2019-09-30 NOTE — PROGRESS NOTES
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   Has had atrial fibrillation, first diagnosed in late 20's. Attributed to caffeine + lack of sleep.   No recurrence until 1/01. Was seeing Dr. Bolton. Ultimately converted spontaneously. His bp was elevated at that time, placed on Atacand, noted cough. Switched to beta blocker.   6/06 noted to have palpitations, work-up indicated PVC's.  Did well until 9/13, developed recurrence. Xarelto and beta blocker initiated >>  converted back to nsr.  At f/u, as he was feeling well, and had CHADSVASc of 0, Xarelto was discontinued and aspirin 81 mg initiated.  Had not taken Flecainide since 2/16.     Update:  Had a lumbar fusion.  Is on a leave of absence.  Has had one episode of palpitations lasting a couple of minutes, 6 months ago.  Took Flecainide >> resolved.  Has the iWatch.  Has not had sustained arrhythmias via the watch.    ECG reveals sinus bradycardia at 54 bpm.      Review of Systems   Constitution: Negative. Negative for malaise/fatigue.   Cardiovascular: Positive for palpitations. Negative for chest pain, dyspnea on exertion, irregular heartbeat, leg swelling, near-syncope, orthopnea, paroxysmal nocturnal dyspnea and syncope.   Respiratory: Negative for cough and shortness of breath.    Neurological: Negative for dizziness, light-headedness and weakness.   All other systems reviewed and are negative.       Objective:    Physical Exam   Constitutional: He is oriented to person, place, and time. He appears well-developed and well-nourished.   Eyes: Conjunctivae are normal. No scleral icterus.   Neck: No JVD present. No tracheal deviation present.   Cardiovascular: Normal rate, regular rhythm and normal heart sounds. PMI is not displaced.    Pulmonary/Chest: Effort normal and breath sounds normal. No respiratory distress.   Abdominal: Soft. There is no hepatosplenomegaly. There is no tenderness.   Musculoskeletal: He exhibits no edema (lower extremity) or tenderness.   Neurological: He is alert and oriented to person, place, and time.   Skin: Skin is warm and dry. No rash noted.   Psychiatric: He has a normal mood and affect. His behavior is normal.         Assessment:       1. Paroxysmal atrial fibrillation    2. Bradycardia    3. Palpitations    4. ALLAN on CPAP    5. Spinal stenosis, unspecified spinal region         Plan:       Doing well.  Continue current therapy.  F/u in one year.

## 2019-10-03 ENCOUNTER — PATIENT MESSAGE (OUTPATIENT)
Dept: FAMILY MEDICINE | Facility: CLINIC | Age: 69
End: 2019-10-03

## 2019-10-04 ENCOUNTER — PATIENT MESSAGE (OUTPATIENT)
Dept: FAMILY MEDICINE | Facility: CLINIC | Age: 69
End: 2019-10-04

## 2019-10-06 ENCOUNTER — PATIENT MESSAGE (OUTPATIENT)
Dept: FAMILY MEDICINE | Facility: CLINIC | Age: 69
End: 2019-10-06

## 2019-10-09 ENCOUNTER — TELEPHONE (OUTPATIENT)
Dept: FAMILY MEDICINE | Facility: CLINIC | Age: 69
End: 2019-10-09

## 2019-10-09 NOTE — TELEPHONE ENCOUNTER
Spoke to samuel with [facility]. Their dept will reach out to Arnold Ramos and  and will coordinate meeting and let me know of possible times.

## 2019-10-09 NOTE — TELEPHONE ENCOUNTER
Patient is requesting that you contact him at [phone]. Or you can call Arnold Ramos [phone], [phone].

## 2019-10-14 ENCOUNTER — PATIENT MESSAGE (OUTPATIENT)
Dept: SPINE | Facility: CLINIC | Age: 69
End: 2019-10-14

## 2019-10-14 DIAGNOSIS — Z98.1 S/P SPINAL FUSION: Primary | ICD-10-CM

## 2019-10-14 NOTE — TELEPHONE ENCOUNTER
Patient notified that Florence is out of the office ansd will contact him tomorrow, pt stated understanding.

## 2019-10-14 NOTE — TELEPHONE ENCOUNTER
Ok just please let him know I am not in the office today and will call him tomorrow.    Thanks,  MEAGAN Bingham, PA-C  Neurosurgery  [facility]  [facility]

## 2019-10-15 RX ORDER — OXYCODONE AND ACETAMINOPHEN 10; 325 MG/1; MG/1
1 TABLET ORAL EVERY 4 HOURS PRN
Qty: 90 TABLET | Refills: 0 | Status: SHIPPED | OUTPATIENT
Start: 2019-10-15 | End: 2020-12-18

## 2019-10-15 NOTE — TELEPHONE ENCOUNTER
I spoke with patient and Percocet prescribed.    Florence Payan, MEAGAN, PA-C  Neurosurgery  [facility] Sp[facility]  [facility]

## 2019-10-15 NOTE — TELEPHONE ENCOUNTER
----- Message from Emily Lentz MA sent at 10/15/2019  1:19 PM CDT -----  Contact: pt      ----- Message -----  From: Melani Luna  Sent: 10/15/2019  12:15 PM CDT  To: Schuyler OLS[facility] Staff    Name of Who is Calling: pt    What is the request in detail: is regarding a Rx refill. Pt is asking to speak with Ms. Henriquez.Please contact to further discuss and advise      Can the clinic reply by [facility]: no    What Number to [facility] Back [facility]: [phone]

## 2019-10-22 ENCOUNTER — OFFICE VISIT (OUTPATIENT)
Dept: FAMILY MEDICINE | Facility: CLINIC | Age: 69
End: 2019-10-22
Payer: COMMERCIAL

## 2019-10-22 VITALS
WEIGHT: 217.63 LBS | SYSTOLIC BLOOD PRESSURE: 128 MMHG | OXYGEN SATURATION: 98 % | HEART RATE: 76 BPM | DIASTOLIC BLOOD PRESSURE: 62 MMHG | HEIGHT: 73 IN | BODY MASS INDEX: 28.84 KG/M2 | TEMPERATURE: 98 F

## 2019-10-22 DIAGNOSIS — Z92.241 HISTORY OF STEROID THERAPY: ICD-10-CM

## 2019-10-22 DIAGNOSIS — M48.062 SPINAL STENOSIS OF LUMBAR REGION WITH NEUROGENIC CLAUDICATION: ICD-10-CM

## 2019-10-22 DIAGNOSIS — R53.83 FATIGUE, UNSPECIFIED TYPE: ICD-10-CM

## 2019-10-22 DIAGNOSIS — M47.9 SPONDYLOSIS: ICD-10-CM

## 2019-10-22 DIAGNOSIS — F43.20 ADJUSTMENT DISORDER, UNSPECIFIED TYPE: ICD-10-CM

## 2019-10-22 DIAGNOSIS — M06.4 UNDIFFERENTIATED INFLAMMATORY POLYARTHRITIS: ICD-10-CM

## 2019-10-22 DIAGNOSIS — M48.00 SPINAL STENOSIS, UNSPECIFIED SPINAL REGION: ICD-10-CM

## 2019-10-22 DIAGNOSIS — N28.1 RENAL CYSTS, ACQUIRED, BILATERAL: Primary | ICD-10-CM

## 2019-10-22 DIAGNOSIS — M43.6 STIFFNESS OF CERVICAL SPINE: ICD-10-CM

## 2019-10-22 DIAGNOSIS — M89.9 BONE DISORDER: ICD-10-CM

## 2019-10-22 PROCEDURE — 99215 PR OFFICE/OUTPT VISIT, EST, LEVL V, 40-54 MIN: ICD-10-PCS | Mod: 25,S$GLB,, | Performed by: FAMILY MEDICINE

## 2019-10-22 PROCEDURE — 90662 FLU VACCINE - HIGH DOSE (65+) PRESERVATIVE FREE IM: ICD-10-PCS | Mod: S$GLB,,, | Performed by: FAMILY MEDICINE

## 2019-10-22 PROCEDURE — 90471 IMMUNIZATION ADMIN: CPT | Mod: S$GLB,,, | Performed by: FAMILY MEDICINE

## 2019-10-22 PROCEDURE — 1101F PR PT FALLS ASSESS DOC 0-1 FALLS W/OUT INJ PAST YR: ICD-10-PCS | Mod: CPTII,S$GLB,, | Performed by: FAMILY MEDICINE

## 2019-10-22 PROCEDURE — 99999 PR PBB SHADOW E&M-EST. PATIENT-LVL V: CPT | Mod: PBBFAC,,, | Performed by: FAMILY MEDICINE

## 2019-10-22 PROCEDURE — 90662 IIV NO PRSV INCREASED AG IM: CPT | Mod: S$GLB,,, | Performed by: FAMILY MEDICINE

## 2019-10-22 PROCEDURE — 90471 FLU VACCINE - HIGH DOSE (65+) PRESERVATIVE FREE IM: ICD-10-PCS | Mod: S$GLB,,, | Performed by: FAMILY MEDICINE

## 2019-10-22 PROCEDURE — 99215 OFFICE O/P EST HI 40 MIN: CPT | Mod: 25,S$GLB,, | Performed by: FAMILY MEDICINE

## 2019-10-22 PROCEDURE — 99999 PR PBB SHADOW E&M-EST. PATIENT-LVL V: ICD-10-PCS | Mod: PBBFAC,,, | Performed by: FAMILY MEDICINE

## 2019-10-22 PROCEDURE — 1101F PT FALLS ASSESS-DOCD LE1/YR: CPT | Mod: CPTII,S$GLB,, | Performed by: FAMILY MEDICINE

## 2019-10-22 RX ORDER — DULOXETIN HYDROCHLORIDE 30 MG/1
30 CAPSULE, DELAYED RELEASE ORAL DAILY
Qty: 30 CAPSULE | Refills: 11 | Status: SHIPPED | OUTPATIENT
Start: 2019-10-22 | End: 2020-03-16 | Stop reason: SDUPTHER

## 2019-10-22 NOTE — PROGRESS NOTES
(Portions of this note were dictated using voice recognition software and may contain dictation related errors in spelling/grammar/syntax not found on text review)    CC:   Chief Complaint   Patient presents with    Follow-up       HPI: 69 y.o. male annual exam July 2019.  Here for follow-up of several issues.    Medical history includes:   sleep apnea, on APAP      Atrial fibrillation, paroxysmal, on flecainide as a pill in the pocket treatment, rarely needs this.  On metoprolol as well for rate control.    Rheumatoid arthritis:  Initial workup in 2015 secondary to multi joint pains and weakness.  Initial thought for PMR but not complete response to steroids as expected.  Later diagnosed with seronegative RA.  Has tried DMARD treatment with methotrexate and then Humira but currently is off all meds for this.    Degenerative disc disease cervical and lumbar.  Followed by neuro surgery  Had lumbar surgery in 2000 and again 2009.  Had progressive neck pain and radiculopathy symptoms, prompting C4-5 and C5-6 ACDF in 2018.  Subsequently started getting lumbar radiculopathy symptoms and August 2019 had lumbar surgery:  L3 through S1 fusion.  His neurosurgeon has him on Percocet for pain control.  He takes about 3-4 day along with gabapentin.  His neurosurgeon has left the [facility] system for appointment out of the country although patient thinks that he heard he may be coming back at some point.  Patient does plan to establish with Dr. Deisy Teran as his wife goes to him as well.    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 Neuro surgery report note reviewed from 09/10/2019.  Concerning that his progress is slow and that it was not felt that he would be able to adequately perform the duties required for his specialty given need to stand for long time and surgery and frequent sitting and standing and  movement required in clinic.  States that if he is walking around he feels little bit better but just standing and being in a stationary position is especially difficult.  He feels like even standing and bending over for a few minutes starts causing him significant pain in his lower back, radiating to the thighs.  He also gets numbness in his right foot.  He will get left arm paresthesias as well worse with neck rotations.  He notices also gripping difficulties and stiffness in his hands from arthritis change.  He specifically mentions things like doing a gynecologic speculum examination and doing obstetric examinations is becoming much more difficult because of gripping, hand and wrist motions, and rotational motions needed during examination.      Fatigue, multifactorial with possible situational depression and anxiety symptoms, was on Cymbalta 60 daily although currently has weaned off. He is interested in restarting Cymbalta . A lot of these recent events have cause some mental distress and he feels like he would like to try this back again.    Also at 1 time in 2015 when he was dealing with the above weakness issues described, he had seen Endocrinology.  Recommended potential look into adrenal function given prolonged steroid use at the time to see if this would help with his MSK issues, along with thyroid function assessment for Hashimoto's.  Review of his labs show his TSH has been within normal range recently.  Also sodium level and potassium have been under normal range.  There was a DEXA done in 2016 which was normal.  We did discuss given prior steroid use that he may want to have another DEXA done again.    Also he had seen a urologist at 1.4 kidney stones after CT had noticed this along with bilateral renal cyst.  Follow-up ultrasound showed stability of the cyst.  He was wondering if he could get a repeat ultrasound to track this and see if he needs further evaluation.  His sister did have renal cell  carcinoma.  He denies any fevers, chills, hematuria, abdominal pain, or urinary discomfort.      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    Sleep apnea        Past Surgical History:   Procedure Laterality Date    ANTERIOR CERVICAL DISCECTOMY W/ FUSION Right 11/28/2018    Procedure: C4-5 and C5-6 ACDF (DISCECTOMY , SPINE , CERVICAL, ANTERIOR APPROACH W/FUSION);  Surgeon: José Miguel Hitchcock MD;  Location: [facility];  Service: Neurosurgery;  Laterality: Right;  Length of surgery: 1-2 hours  Length of stay: 1-2 hours  [place]: II  ASA: I  Brace: [place] J  NeuroMonitoring: EMG, SEP  Radiology: C-arm  Bed: Regular  Position: Supine    BACK SURGERY      1990's and 2009; last by Naldo    COLONOSCOPY N/A 1/30/2018    Procedure: COLONOSCOPY;  Surgeon: Nadia Scott MD;  Location: [facility];  Service: Endoscopy;  Laterality: N/A;    COSMETIC SURGERY      Focal Laser       Right eye    HERNIA REPAIR Right     inguinal    MINIMALLY INVASIVE FUSION OF SPINE Right 8/7/2019    Procedure: FUSION, SPINE, MINIMALLY INVASIVE- Direct lateral fusion with posterior pedicle screws: Lateral LEFT L3-4 and L4-5; Posterior RIGHT MIS TLIF L5-S1;  Surgeon: José Miguel Hitchcock MD;  Location: [address];  Service: Neurosurgery;  Laterality: Right;    SKIN CANCER EXCISION      SPINE SURGERY      l3-4/ l5-s1  (x 2)    TONSILLECTOMY         Family History   Problem Relation Age of Onset    Colon polyps Father     Cancer Father         leukemia    Glaucoma Mother     Thyroid disease Sister         hashimoto    Cancer Sister         renal    No Known Problems Sister     Heart failure  Maternal Grandfather     Cataracts Maternal Grandmother     Alzheimer's disease Maternal Grandmother     No Known Problems Maternal Aunt     No Known Problems Maternal Uncle     No Known Problems Paternal Aunt     No Known Problems Paternal Uncle     No Known Problems Paternal Grandmother     Cancer Paternal Grandfather         colon    Diabetes Neg Hx     Heart disease Neg Hx     Amblyopia Neg Hx     Blindness Neg Hx     Macular degeneration Neg Hx     Retinal detachment Neg Hx     Strabismus Neg Hx     Hypertension Neg Hx     Stroke Neg Hx     Melanoma Neg Hx        Social History     Tobacco Use    Smoking status: Never Smoker    Smokeless tobacco: Never Used   Substance Use Topics    Alcohol use: Yes     Alcohol/week: 5.0 standard drinks     Types: 6 Standard drinks or equivalent per week     Frequency: 2-4 times a month     Drinks per session: 1 or 2     Binge frequency: Never     Comment: 3 X WEEK     Drug use: No       Lab Results   Component Value Date    WBC 13.11 (H) 08/07/2019    HGB 12.7 (L) 08/07/2019    HCT 39.1 (L) 08/07/2019    MCV 93 08/07/2019     08/07/2019    CHOL 172 07/30/2019    TRIG 69 07/30/2019    HDL 56 07/30/2019    ALT 23 07/30/2019    AST 28 07/30/2019    BILITOT 0.7 07/30/2019    ALKPHOS 80 07/30/2019     07/30/2019    K 4.3 07/30/2019     07/30/2019    CREATININE 0.8 07/30/2019    ESTGFRAFRICA >60 07/30/2019    EGFRNONAA >60 07/30/2019    CALCIUM 9.9 07/30/2019    ALBUMIN 4.0 07/30/2019    BUN 15 07/30/2019    CO2 25 07/30/2019    TSH 3.204 04/06/2019    PSA 0.49 08/01/2018    INR 1.0 07/30/2019    HGBA1C 5.3 07/30/2019    LDLCALC 102.2 07/30/2019     07/30/2019                 ROS:  GENERAL: No fever, chills, fatigability or weight loss.  SKIN: No rashes, no itching.  HEAD: No headaches.  EYES: No visual changes  EARS: No ear pain or changes in hearing.  NOSE: No congestion or rhinorrhea.  MOUTH & THROAT: No hoarseness, change in voice,  or sore throat.  NODES: Denies swollen glands.  CHEST: Denies YAN, cyanosis, wheezing, cough and sputum production.  CARDIOVASCULAR: Denies chest pain, PND, orthopnea.  ABDOMEN: No nausea, vomiting, or changes in bowel function.  URINARY: No flank pain, dysuria or hematuria.  PERIPHERAL VASCULAR: No claudication or cyanosis.  MUSCULOSKELETAL: No joint stiffness or swelling. Denies back pain.  NEUROLOGIC: No weakness or numbness.    Vital signs reviewed  PE:   APPEARANCE: Well nourished, well developed, in no acute distress.    HEAD: Normocephalic, atraumatic.  EYES:   Conjunctivae noninjected.        IMPRESSION   1. Renal cysts, acquired, bilateral    2. Bone disorder    3. History of steroid therapy    4. Spinal stenosis of lumbar region with neurogenic claudication    5. Spinal stenosis, unspecified spinal region    6. Stiffness of cervical spine    7. Spondylosis    8. Undifferentiated inflammatory polyarthritis    9. Adjustment disorder, unspecified type    10. Fatigue, unspecified type            PLAN  Will update renal ultrasound to track renal cyst size.  If any changing characteristics, may need renal CT    Will get DEXA given history of prior prolonged steroid use    Continue neuro surgery follow-up as directed for his cervical and lumbar spinal stenosis and radiculopathy symptoms.  Status post surgeries in both regions recently with persistent symptoms of stiffness, pain, radiation and paresthesias as described above.  Affecting ability to stand up, sit down repeatedly through the day, remain standing or awkwardly position such as bending over for any length of time, also with hand motions required during his work.  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  He has taken a leave from [facility] at this time.  His neurosurgeon also felt that it may not be reasonable that he would be able to return to work because of his  significant orthopedic and neurologic difficulties. Reviewed neuro surgery notes and recommendations as outlined above.    Will restart Cymbalta at 30 mg to help with some of the adjustment disorder issues that have arisen with his recent decline in health and impending senior living.  He was hopeful to keep working until 72 years of age and then just do part-time but at this time with his healthy does not find that this is possible and feels like he is being forced to stop work earlier than desired..  We discussed that we could potentially titrate up to 60 depending on how he does over the next several weeks.      Reviewed his recent labs above including thyroid profiles, electrolytes.  At this time we probably do not need any further thyroid or adrenal workup in the absence of any clinical symptoms suggesting as such or lab abnormalities suggesting further workup is needed        HEALTH SCREENINGS  Immunizations:  Tdap 2014  PCV 10/17/17  PPSV 2018  Flu today    Age/Gender Appropriate screenings:  Prostate screening : PSA as above 0.49 in 2018.  Defers retesting this year  Colonoscopy 2018.  Internal hemorrhoids, otherwise normal.  Return in 5 years

## 2019-10-24 ENCOUNTER — HOSPITAL ENCOUNTER (OUTPATIENT)
Dept: RADIOLOGY | Facility: HOSPITAL | Age: 69
Discharge: HOME | End: 2019-10-24
Attending: FAMILY MEDICINE
Payer: COMMERCIAL

## 2019-10-24 DIAGNOSIS — N28.1 RENAL CYSTS, ACQUIRED, BILATERAL: ICD-10-CM

## 2019-10-24 DIAGNOSIS — Z92.241 HISTORY OF STEROID THERAPY: ICD-10-CM

## 2019-10-24 DIAGNOSIS — M89.9 BONE DISORDER: ICD-10-CM

## 2019-10-24 PROCEDURE — 76770 US EXAM ABDO BACK WALL COMP: CPT | Mod: 26,,, | Performed by: RADIOLOGY

## 2019-10-24 PROCEDURE — 77080 DXA BONE DENSITY AXIAL: CPT | Mod: TC

## 2019-10-24 PROCEDURE — 76770 US EXAM ABDO BACK WALL COMP: CPT | Mod: TC

## 2019-10-24 PROCEDURE — 76770 US RETROPERITONEAL COMPLETE: ICD-10-PCS | Mod: 26,,, | Performed by: RADIOLOGY

## 2019-10-24 PROCEDURE — 77080 DEXA BONE DENSITY SPINE HIP: ICD-10-PCS | Mod: 26,,, | Performed by: RADIOLOGY

## 2019-10-24 PROCEDURE — 77080 DXA BONE DENSITY AXIAL: CPT | Mod: 26,,, | Performed by: RADIOLOGY

## 2019-10-25 ENCOUNTER — PATIENT MESSAGE (OUTPATIENT)
Dept: FAMILY MEDICINE | Facility: CLINIC | Age: 69
End: 2019-10-25

## 2019-10-26 ENCOUNTER — PATIENT MESSAGE (OUTPATIENT)
Dept: ELECTROPHYSIOLOGY | Facility: CLINIC | Age: 69
End: 2019-10-26

## 2019-10-29 DIAGNOSIS — R00.2 PALPITATIONS: ICD-10-CM

## 2019-10-29 DIAGNOSIS — I48.0 PAROXYSMAL ATRIAL FIBRILLATION: Primary | ICD-10-CM

## 2019-10-29 NOTE — TELEPHONE ENCOUNTER
Discussed message with Dr Simon. He asked me to let Dr Buchanan know that he wasn't overly concerned about the BNP but wanted to check his heart function with an echo to be sure that there is no evidence of heart failure. Spoke with Dr Buchanan and advised of recommendations. He verbalizes understanding but is out of town right now and will call me when he gets back to schedule.

## 2019-11-20 ENCOUNTER — TELEPHONE (OUTPATIENT)
Dept: ELECTROPHYSIOLOGY | Facility: CLINIC | Age: 69
End: 2019-11-20

## 2019-11-20 NOTE — TELEPHONE ENCOUNTER
----- Message from Shama Gordon RN sent at 11/20/2019 12:47 PM CST -----  Contact: Patient      ----- Message -----  From: Becky Lozoya  Sent: 11/20/2019  12:39 PM CST  To: [facility] Staff    The Pt is retuning a call from Emmie. Please call him back @ [phone]. Thanks, Becky

## 2019-12-16 ENCOUNTER — HOSPITAL ENCOUNTER (OUTPATIENT)
Dept: CARDIOLOGY | Facility: CLINIC | Age: 69
Discharge: HOME | End: 2019-12-16
Attending: INTERNAL MEDICINE
Payer: COMMERCIAL

## 2019-12-16 VITALS
SYSTOLIC BLOOD PRESSURE: 122 MMHG | BODY MASS INDEX: 28.49 KG/M2 | DIASTOLIC BLOOD PRESSURE: 78 MMHG | HEART RATE: 60 BPM | HEIGHT: 73 IN | WEIGHT: 215 LBS

## 2019-12-16 DIAGNOSIS — I48.0 PAROXYSMAL ATRIAL FIBRILLATION: ICD-10-CM

## 2019-12-16 DIAGNOSIS — R00.2 PALPITATIONS: ICD-10-CM

## 2019-12-16 LAB
ASCENDING AORTA: 3.27 CM
AV INDEX (PROSTH): 1.12
AV MEAN GRADIENT: 5 MMHG
AV PEAK GRADIENT: 10 MMHG
AV VALVE AREA: 5.88 CM2
AV VELOCITY RATIO: 0.9
BSA FOR ECHO PROCEDURE: 2.24 M2
CV ECHO LV RWT: 0.45 CM
DOP CALC AO PEAK VEL: 1.56 M/S
DOP CALC AO VTI: 31.53 CM
DOP CALC LVOT AREA: 5.3 CM2
DOP CALC LVOT DIAMETER: 2.59 CM
DOP CALC LVOT PEAK VEL: 1.41 M/S
DOP CALC LVOT STROKE VOLUME: 185.46 CM3
DOP CALCLVOT PEAK VEL VTI: 35.22 CM
E WAVE DECELERATION TIME: 279.29 MSEC
E/A RATIO: 1.25
E/E' RATIO: 11.25 M/S
ECHO LV POSTERIOR WALL: 0.94 CM (ref 0.6–1.1)
FRACTIONAL SHORTENING: 36 % (ref 28–44)
INTERVENTRICULAR SEPTUM: 0.92 CM (ref 0.6–1.1)
LA MAJOR: 5 CM
LA MINOR: 5 CM
LA WIDTH: 4.12 CM
LEFT ATRIUM SIZE: 4.05 CM
LEFT ATRIUM VOLUME INDEX: 32 ML/M2
LEFT ATRIUM VOLUME: 70.92 CM3
LEFT INTERNAL DIMENSION IN SYSTOLE: 2.67 CM (ref 2.1–4)
LEFT VENTRICLE DIASTOLIC VOLUME INDEX: 34.23 ML/M2
LEFT VENTRICLE DIASTOLIC VOLUME: 75.96 ML
LEFT VENTRICLE MASS INDEX: 55 G/M2
LEFT VENTRICLE SYSTOLIC VOLUME INDEX: 11.9 ML/M2
LEFT VENTRICLE SYSTOLIC VOLUME: 26.4 ML
LEFT VENTRICULAR INTERNAL DIMENSION IN DIASTOLE: 4.14 CM (ref 3.5–6)
LEFT VENTRICULAR MASS: 121.27 G
LV LATERAL E/E' RATIO: 10 M/S
LV SEPTAL E/E' RATIO: 12.86 M/S
MV PEAK A VEL: 0.72 M/S
MV PEAK E VEL: 0.9 M/S
PISA TR MAX VEL: 2.57 M/S
PULM VEIN S/D RATIO: 1.57
PV PEAK D VEL: 0.49 M/S
PV PEAK S VEL: 0.77 M/S
RA MAJOR: 5.39 CM
RA PRESSURE: 3 MMHG
RA WIDTH: 3.62 CM
RIGHT VENTRICULAR END-DIASTOLIC DIMENSION: 3.42 CM
SINUS: 4.15 CM
STJ: 2.88 CM
TDI LATERAL: 0.09 M/S
TDI SEPTAL: 0.07 M/S
TDI: 0.08 M/S
TR MAX PG: 26 MMHG
TRICUSPID ANNULAR PLANE SYSTOLIC EXCURSION: 2 CM
TV REST PULMONARY ARTERY PRESSURE: 29 MMHG

## 2019-12-16 PROCEDURE — 93306 ECHO (CUPID ONLY): ICD-10-PCS | Mod: 26,,, | Performed by: INTERNAL MEDICINE

## 2019-12-16 PROCEDURE — 93306 TTE W/DOPPLER COMPLETE: CPT | Mod: 26,,, | Performed by: INTERNAL MEDICINE

## 2019-12-16 PROCEDURE — 93306 TTE W/DOPPLER COMPLETE: CPT

## 2019-12-17 ENCOUNTER — TELEPHONE (OUTPATIENT)
Dept: ELECTROPHYSIOLOGY | Facility: CLINIC | Age: 69
End: 2019-12-17

## 2019-12-18 ENCOUNTER — TELEPHONE (OUTPATIENT)
Dept: ELECTROPHYSIOLOGY | Facility: CLINIC | Age: 69
End: 2019-12-18

## 2019-12-18 NOTE — TELEPHONE ENCOUNTER
Notes recorded by Real Simon MD on 12/17/2019 at 6:50 AM CST  Echo reveals normal structure and function please relay to Dr. Buchanan. Message left

## 2020-01-13 ENCOUNTER — PATIENT MESSAGE (OUTPATIENT)
Dept: ELECTROPHYSIOLOGY | Facility: CLINIC | Age: 70
End: 2020-01-13

## 2020-01-16 ENCOUNTER — TELEPHONE (OUTPATIENT)
Dept: FAMILY MEDICINE | Facility: CLINIC | Age: 70
End: 2020-01-16

## 2020-01-16 NOTE — TELEPHONE ENCOUNTER
Advised [facility] that we only received cover sheet, did not receive second sheet.  They will refax.

## 2020-01-16 NOTE — TELEPHONE ENCOUNTER
----- Message from Ernesto Flowers sent at 1/16/2020 12:42 PM CST -----  Contact: [facility]/[phone]  [facility] called to advise the patient ordered CPAP supplies and they need to know if your office received the request for the chart notes dated 11/01/19.    Please call [phone] to discuss today.

## 2020-01-20 ENCOUNTER — TELEPHONE (OUTPATIENT)
Dept: FAMILY MEDICINE | Facility: CLINIC | Age: 70
End: 2020-01-20

## 2020-01-20 NOTE — TELEPHONE ENCOUNTER
----- Message from Gail Corley sent at 1/20/2020 10:18 AM CST -----  Contact: Mary Ann, [facility], [phone]  States they faxed a CPAP physician's order and wants to know if patient needs to be seen before doctor will sign order. Please advise.

## 2020-01-20 NOTE — TELEPHONE ENCOUNTER
Spoke with Maura Maloney, advised that last visits notes were faxed over on Friday.  Maura Maloney verbalized understanding.

## 2020-01-28 ENCOUNTER — OFFICE VISIT (OUTPATIENT)
Dept: FAMILY MEDICINE | Facility: CLINIC | Age: 70
End: 2020-01-28
Payer: MEDICARE

## 2020-01-28 VITALS
TEMPERATURE: 98 F | WEIGHT: 215.19 LBS | HEART RATE: 68 BPM | OXYGEN SATURATION: 97 % | SYSTOLIC BLOOD PRESSURE: 136 MMHG | DIASTOLIC BLOOD PRESSURE: 78 MMHG | BODY MASS INDEX: 28.52 KG/M2 | HEIGHT: 73 IN

## 2020-01-28 DIAGNOSIS — M06.4 UNDIFFERENTIATED INFLAMMATORY POLYARTHRITIS: ICD-10-CM

## 2020-01-28 DIAGNOSIS — M48.062 SPINAL STENOSIS OF LUMBAR REGION WITH NEUROGENIC CLAUDICATION: ICD-10-CM

## 2020-01-28 DIAGNOSIS — I48.0 PAROXYSMAL ATRIAL FIBRILLATION: ICD-10-CM

## 2020-01-28 DIAGNOSIS — M48.00 SPINAL STENOSIS, UNSPECIFIED SPINAL REGION: ICD-10-CM

## 2020-01-28 DIAGNOSIS — M47.9 SPONDYLOSIS: ICD-10-CM

## 2020-01-28 DIAGNOSIS — F43.20 ADJUSTMENT DISORDER, UNSPECIFIED TYPE: ICD-10-CM

## 2020-01-28 DIAGNOSIS — D64.9 ANEMIA, UNSPECIFIED TYPE: ICD-10-CM

## 2020-01-28 DIAGNOSIS — G47.33 OSA (OBSTRUCTIVE SLEEP APNEA): Primary | ICD-10-CM

## 2020-01-28 PROCEDURE — 99999 PR PBB SHADOW E&M-EST. PATIENT-LVL IV: ICD-10-PCS | Mod: PBBFAC,,, | Performed by: FAMILY MEDICINE

## 2020-01-28 PROCEDURE — 1125F PR PAIN SEVERITY QUANTIFIED, PAIN PRESENT: ICD-10-PCS | Mod: ,,, | Performed by: FAMILY MEDICINE

## 2020-01-28 PROCEDURE — 1159F MED LIST DOCD IN RCRD: CPT | Mod: ,,, | Performed by: FAMILY MEDICINE

## 2020-01-28 PROCEDURE — 99215 PR OFFICE/OUTPT VISIT, EST, LEVL V, 40-54 MIN: ICD-10-PCS | Mod: S$PBB,,, | Performed by: FAMILY MEDICINE

## 2020-01-28 PROCEDURE — 1159F PR MEDICATION LIST DOCUMENTED IN MEDICAL RECORD: ICD-10-PCS | Mod: ,,, | Performed by: FAMILY MEDICINE

## 2020-01-28 PROCEDURE — 99999 PR PBB SHADOW E&M-EST. PATIENT-LVL IV: CPT | Mod: PBBFAC,,, | Performed by: FAMILY MEDICINE

## 2020-01-28 PROCEDURE — 1125F AMNT PAIN NOTED PAIN PRSNT: CPT | Mod: ,,, | Performed by: FAMILY MEDICINE

## 2020-01-28 PROCEDURE — 99214 OFFICE O/P EST MOD 30 MIN: CPT | Mod: PBBFAC,PO | Performed by: FAMILY MEDICINE

## 2020-01-28 PROCEDURE — 99215 OFFICE O/P EST HI 40 MIN: CPT | Mod: S$PBB,,, | Performed by: FAMILY MEDICINE

## 2020-01-28 RX ORDER — TRAMADOL HYDROCHLORIDE 50 MG/1
50 TABLET ORAL EVERY 6 HOURS PRN
COMMUNITY

## 2020-01-28 NOTE — PROGRESS NOTES
(Portions of this note were dictated using voice recognition software and may contain dictation related errors in spelling/grammar/syntax not found on text review)    CC:   Chief Complaint   Patient presents with    Annual Exam       HPI: 69 y.o. male here for annual exam    Medical history includes:   sleep apnea, on APAP.  Uses nightly, can't really function without at.  This is benefitting him significantly.  Denies any complications with usage of PAP.  Needs an order for new supplies    Atrial fibrillation, paroxysmal, on flecainide as a pill in the pocket treatment, rarely needs this.  On metoprolol as well for rate control.    Rheumatoid arthritis:  Initial workup in 2015 secondary to multi joint pains and weakness.  Initial thought for PMR but not complete response to steroids as expected.  Later diagnosed with seronegative RA.  Has tried DMARD treatment with methotrexate and then Humira but currently is off all meds for this.    Degenerative disc disease cervical and lumbar.  Followed by neuro surgery  Had lumbar surgery in 2000 and again 2009.  Had progressive neck pain and radiculopathy symptoms, prompting C4-5 and C5-6 ACDF in 2018.  Subsequently started getting lumbar radiculopathy symptoms and August 2019 had lumbar surgery:  L3 through S1 fusion.  His neurosurgeon had started him on Percocet for pain control.  Currently is on Percocet just once in a while for breakthrough symptoms but more regularly takes tramadol 2 to 3 times a day from his pain management physician, Dr. Corado/   [facility] for appointment out of the country, and patient is subsequently established with Dr. 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 Neuro surgery report note reviewed from 09/10/2019.  Concerning that his progress  is slow and that it was not felt that he would be able to adequately perform the duties required for his specialty given need to stand for long time and surgery and frequent sitting and standing and movement required in clinic.  States that if he is walking around he feels little bit better but just standing and being in a stationary position is especially difficult.  He feels like even standing and bending over for a few minutes starts causing him significant pain in his lower back, radiating to the thighs.  He also gets numbness in his right foot.  He will get left arm paresthesias as well worse with neck rotations.  He notices also gripping difficulties and stiffness in his hands from arthritis change.  He specifically mentions things like doing a gynecologic speculum examination and doing obstetric examinations is becoming much more difficult because of gripping, hand and wrist motions, and rotational motions needed during examination.He does need some forms filled out for long-term disability because of the above issues    Fatigue, multifactorial with possible situational depression and anxiety symptoms, was on Cymbalta 60 daily although currently had weaned off in the past was interested in restarting.  Was restarted on Cymbalta 30 mg at last visit.  However he states that he never started until just a couple of days ago when he felt anxiety and depression symptoms getting worse.  Has been on it for couple of days.    Also he had seen a urologist at 1.4 kidney stones after CT had noticed this along with bilateral renal cyst.  Follow-up ultrasound showed stability of the cyst.         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    Sleep apnea        Past Surgical History:   Procedure Laterality Date    ANTERIOR CERVICAL DISCECTOMY W/ FUSION Right 11/28/2018    Procedure: C4-5 and C5-6 ACDF (DISCECTOMY , SPINE , CERVICAL, ANTERIOR APPROACH W/FUSION);  Surgeon: José Miguel Hitchcock MD;  Location: [facility];  Service: Neurosurgery;  Laterality: Right;  Length of surgery: 1-2 hours  Length of stay: 1-2 hours  [place]: II  ASA: I  Brace: [place]KA  NeuroMonitoring: EMG, SEP  Radiology: C-arm  Bed: Regular  Position: Supine    BACK SURGERY      1990's and 2009; last by Naldo    COLONOSCOPY N/A 1/30/2018    Procedure: COLONOSCOPY;  Surgeon: Nadia Scott MD;  Location: [facility];  Service: Endoscopy;  Laterality: N/A;    COSMETIC SURGERY      Focal Laser       Right eye    HERNIA REPAIR Right     inguinal    MINIMALLY INVASIVE FUSION OF SPINE Right 8/7/2019    Procedure: FUSION, SPINE, MINIMALLY INVASIVE- Direct lateral fusion with posterior pedicle screws: Lateral LEFT L3-4 and L4-5; Posterior RIGHT MIS TLIF L5-S1;  Surgeon: José Miguel Hitchcock MD;  Location: [address];  Service: Neurosurgery;  Laterality: Right;    SKIN CANCER EXCISION      SPINE SURGERY      l3-4/ l5-s1  (x 2)    TONSILLECTOMY         Family History   Problem Relation Age of Onset    Colon polyps Father     Cancer Father         leukemia    Glaucoma Mother     Thyroid disease Sister         hashimoto    Cancer Sister         renal    No Known Problems Sister     Heart failure Maternal Grandfather     Cataracts Maternal Grandmother     Alzheimer's disease Maternal Grandmother     No Known Problems Maternal Aunt     No Known Problems Maternal Uncle     No Known Problems Paternal Aunt     No Known Problems Paternal Uncle     No Known Problems Paternal Grandmother     Cancer Paternal Grandfather         colon    Diabetes Neg Hx     Heart disease Neg Hx     Amblyopia Neg Hx     Blindness Neg  Hx     Macular degeneration Neg Hx     Retinal detachment Neg Hx     Strabismus Neg Hx     Hypertension Neg Hx     Stroke Neg Hx     Melanoma Neg Hx        Social History     Tobacco Use    Smoking status: Never Smoker    Smokeless tobacco: Never Used   Substance Use Topics    Alcohol use: Yes     Alcohol/week: 5.0 standard drinks     Types: 6 Standard drinks or equivalent per week     Frequency: 2-4 times a month     Drinks per session: 1 or 2     Binge frequency: Never     Comment: 3 X WEEK     Drug use: No       Lab Results   Component Value Date    WBC 13.11 (H) 08/07/2019    HGB 12.7 (L) 08/07/2019    HCT 39.1 (L) 08/07/2019    MCV 93 08/07/2019     08/07/2019    CHOL 172 07/30/2019    TRIG 69 07/30/2019    HDL 56 07/30/2019    ALT 23 07/30/2019    AST 28 07/30/2019    BILITOT 0.7 07/30/2019    ALKPHOS 80 07/30/2019     07/30/2019    K 4.3 07/30/2019     07/30/2019    CREATININE 0.8 07/30/2019    ESTGFRAFRICA >60 07/30/2019    EGFRNONAA >60 07/30/2019    CALCIUM 9.9 07/30/2019    ALBUMIN 4.0 07/30/2019    BUN 15 07/30/2019    CO2 25 07/30/2019    TSH 3.204 04/06/2019    PSA 0.49 08/01/2018    INR 1.0 07/30/2019    HGBA1C 5.3 07/30/2019    LDLCALC 102.2 07/30/2019     07/30/2019                 ROS:  GENERAL: No fever, chills, fatigability or weight loss.  SKIN: No rashes, no itching.  HEAD: No headaches.  EYES: No visual changes  EARS: No ear pain or changes in hearing.  NOSE: No congestion or rhinorrhea.  MOUTH & THROAT: No hoarseness, change in voice, or sore throat.  NODES: Denies swollen glands.  CHEST: Denies YAN, cyanosis, wheezing, cough and sputum production.  CARDIOVASCULAR: Denies chest pain, PND, orthopnea.  ABDOMEN: No nausea, vomiting, or changes in bowel function.  URINARY: No flank pain, dysuria or hematuria.  PERIPHERAL VASCULAR: No claudication or cyanosis.  MUSCULOSKELETAL:  Above  NEUROLOGIC:  Above.  PSYCHIATRIC:  Above    Vital signs reviewed  PE:    APPEARANCE: Well nourished, well developed, in no acute distress.    HEAD: Normocephalic, atraumatic.  EYES: PERRL. EOMI.   Conjunctivae noninjected.  EARS: TM's intact. Light reflex normal. No retraction or perforation  NOSE: Mucosa pink. Airway clear.  MOUTH & THROAT: No tonsillar enlargement. No pharyngeal erythema or exudate.   NECK: Supple with no cervical lymphadenopathy.  No carotid bruits.  No thyromegaly  CHEST: Good inspiratory effort. Lungs clear to auscultation with no wheezes or crackles.  CARDIOVASCULAR: Normal S1, S2. No rubs, murmurs, or gallops.  ABDOMEN: Bowel sounds normal. Not distended. Soft. No tenderness or masses. No organomegaly.  EXTREMITIES: No edema, cyanosis, or clubbing.      IMPRESSION   1. ALLAN (obstructive sleep apnea)    2. Spondylosis    3. Spinal stenosis, unspecified spinal region    4. Spinal stenosis of lumbar region with neurogenic claudication    5. Adjustment disorder, unspecified type    6. Undifferentiated inflammatory polyarthritis    7. Paroxysmal atrial fibrillation    8. Anemia, unspecified type            PLAN   He will continue pain management and  neuro surgery follow-up as directed for his cervical and lumbar spinal stenosis and radiculopathy symptoms.  Status post surgeries in both regions recently with persistent symptoms of stiffness, pain, radiation and paresthesias as described above.  Affecting ability to stand up, sit down repeatedly through the day, remain standing or awkwardly position such as bending over for any length of time, also with hand motions required during his work.  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  His neurosurgeon also felt that it may not be reasonable that he would be able to return to work because of his significant orthopedic and neurologic difficulties. Reviewed neuro surgery notes and recommendations as outlined above.  Will fill out his paperwork with  the appropriate information as mentioned above.     adjustment disorder issues that have arisen with his recent decline in health and intermediate.  Continue Cymbalta at 30 mg.  Could titrate upward if not effective over the next month or so.  Could consider different medication like SSRI treatment if Cymbalta is not helping and titration is not possible.        Reviewed his recent labs above.  Did have some mild leukocytosis and anemia at the time of his surgery.  He is asymptomatic at this time.  We discussed holding off on any immediate lab work at this time, and can consider this next year, or sooner if needed for any clinical symptoms that arise    Atrial fibrillation:  Not currently bothersome    Seronegative RA, has not been acutely problematic at this time.  Currently not on any biologics or disease modifying agents        HEALTH SCREENINGS  Immunizations:  Tdap 2014  PCV 10/17/17  PPSV 2018  Flu up-to-date    Age/Gender Appropriate screenings:  Prostate screening : PSA as above 0.49 in 2018.  , defers testing this year  Colonoscopy 2018.  Internal hemorrhoids, otherwise normal.  Return in 5 years

## 2020-02-20 ENCOUNTER — HOSPITAL ENCOUNTER (OUTPATIENT)
Dept: RADIOLOGY | Facility: HOSPITAL | Age: 70
Discharge: HOME | End: 2020-02-20
Attending: PHYSICAL MEDICINE & REHABILITATION
Payer: MEDICARE

## 2020-02-20 DIAGNOSIS — M54.16 LUMBAR RADICULOPATHY: ICD-10-CM

## 2020-02-20 PROCEDURE — 72110 XR LUMBAR SPINE 5 VIEW WITH FLEX AND EXT: ICD-10-PCS | Mod: 26,,, | Performed by: RADIOLOGY

## 2020-02-20 PROCEDURE — 72110 X-RAY EXAM L-2 SPINE 4/>VWS: CPT | Mod: TC,FY

## 2020-02-20 PROCEDURE — 72110 X-RAY EXAM L-2 SPINE 4/>VWS: CPT | Mod: 26,,, | Performed by: RADIOLOGY

## 2020-03-04 ENCOUNTER — PATIENT MESSAGE (OUTPATIENT)
Dept: FAMILY MEDICINE | Facility: CLINIC | Age: 70
End: 2020-03-04

## 2020-03-04 DIAGNOSIS — G47.33 OSA (OBSTRUCTIVE SLEEP APNEA): Primary | ICD-10-CM

## 2020-03-08 ENCOUNTER — PATIENT MESSAGE (OUTPATIENT)
Dept: FAMILY MEDICINE | Facility: CLINIC | Age: 70
End: 2020-03-08

## 2020-03-14 ENCOUNTER — PATIENT MESSAGE (OUTPATIENT)
Dept: FAMILY MEDICINE | Facility: CLINIC | Age: 70
End: 2020-03-14

## 2020-03-16 ENCOUNTER — PATIENT MESSAGE (OUTPATIENT)
Dept: ELECTROPHYSIOLOGY | Facility: CLINIC | Age: 70
End: 2020-03-16

## 2020-03-16 ENCOUNTER — TELEPHONE (OUTPATIENT)
Dept: FAMILY MEDICINE | Facility: CLINIC | Age: 70
End: 2020-03-16

## 2020-03-16 DIAGNOSIS — G47.33 OSA ON CPAP: Primary | ICD-10-CM

## 2020-03-16 DIAGNOSIS — M50.30 DDD (DEGENERATIVE DISC DISEASE), CERVICAL: ICD-10-CM

## 2020-03-16 RX ORDER — DULOXETIN HYDROCHLORIDE 30 MG/1
30 CAPSULE, DELAYED RELEASE ORAL DAILY
Qty: 180 CAPSULE | Refills: 11 | Status: SHIPPED | OUTPATIENT
Start: 2020-03-16 | End: 2020-03-23 | Stop reason: SDUPTHER

## 2020-03-16 RX ORDER — METOPROLOL SUCCINATE 25 MG/1
25 TABLET, EXTENDED RELEASE ORAL DAILY
Qty: 90 TABLET | Refills: 3 | Status: SHIPPED | OUTPATIENT
Start: 2020-03-16 | End: 2020-03-23 | Stop reason: SDUPTHER

## 2020-03-16 NOTE — TELEPHONE ENCOUNTER
Spoke to pt and requesting a 6 mth supply of his Duloxetine 30mg to [facility]. Pt was also informed that his CPAP supply was refaxed on 3/9/20. Pls advise.

## 2020-03-16 NOTE — TELEPHONE ENCOUNTER
----- Message from Mary Lay sent at 3/16/2020  4:07 PM CDT -----  Contact: patient   Patient is requesting a call back in regards to medications and Cpap . No details given.     Please call [phone] to discuss.

## 2020-03-16 NOTE — TELEPHONE ENCOUNTER
Sent 6 months of the prescription to his pharmacy.  Please check the prior message he sent about needing a nurse visit to fit him for CPAP since he is having some difficulty with his current mask.  would this be done through Sleep Clinic?.  Not sure how this can be scheduled.  Please look into this.  Thank you

## 2020-03-16 NOTE — TELEPHONE ENCOUNTER
Swapnil Buchanan I forwarded this to my staff to see what the process be for scheduling this. Not sure if you need a specific sleep med referral as I think this would be for the doc.  loyd    ===View-only below this line===      ----- Message -----     From: Franko Buchanan MD     Sent: 3/14/2020  2:53 PM CDT       To: Loyd Garcia MD  Subject: Referral Request    I did not hear back on my 3/4 and 3/8 regarding a CPAP nurse appointment for a mask fitting. They need a referral from Dr Garcia.  Please let me know when this request has been taken care of.   Thanks Nurse Franko Alfonso

## 2020-03-22 ENCOUNTER — PATIENT MESSAGE (OUTPATIENT)
Dept: ELECTROPHYSIOLOGY | Facility: CLINIC | Age: 70
End: 2020-03-22

## 2020-03-23 ENCOUNTER — TELEPHONE (OUTPATIENT)
Dept: FAMILY MEDICINE | Facility: CLINIC | Age: 70
End: 2020-03-23

## 2020-03-23 ENCOUNTER — TELEPHONE (OUTPATIENT)
Dept: ELECTROPHYSIOLOGY | Facility: CLINIC | Age: 70
End: 2020-03-23

## 2020-03-23 DIAGNOSIS — M50.30 DDD (DEGENERATIVE DISC DISEASE), CERVICAL: ICD-10-CM

## 2020-03-23 RX ORDER — METOPROLOL SUCCINATE 25 MG/1
25 TABLET, EXTENDED RELEASE ORAL DAILY
Qty: 180 TABLET | Refills: 1 | Status: SHIPPED | OUTPATIENT
Start: 2020-03-23 | End: 2020-05-07 | Stop reason: SDUPTHER

## 2020-03-23 RX ORDER — DULOXETIN HYDROCHLORIDE 30 MG/1
30 CAPSULE, DELAYED RELEASE ORAL DAILY
Qty: 180 CAPSULE | Refills: 11 | Status: SHIPPED | OUTPATIENT
Start: 2020-03-23 | End: 2020-12-18 | Stop reason: SDUPTHER

## 2020-03-23 NOTE — TELEPHONE ENCOUNTER
----- Message from Jenn Rodriguez sent at 3/23/2020  9:29 AM CDT -----  Contact: Osvaldo Abdul called requesting a call back from Azalia Haney. Please call [phone]

## 2020-03-23 NOTE — TELEPHONE ENCOUNTER
I changed the metoprolol prescription to 180 tablets, but it looks like the Cymbalta prescription is already written for 180 of the 30 mg tablets.  Please let me know if anything additional needs to be sent.

## 2020-03-23 NOTE — TELEPHONE ENCOUNTER
Patient is asking if his prescription for metoprolol can be written for 180 tablets instead of 90 tablets.  He is going to [place] to stay with his daughter and is wanting to fill it prior to going. Also is requesting 60 tablets of Duloxetine.  Please advise/

## 2020-03-23 NOTE — TELEPHONE ENCOUNTER
Spoke with pt about his refill request for 180 tablets.  I explained that we cannot give him 180 tablets, as he is only prescribed to take metoprolol once daily.  Pt expressed understanding.

## 2020-03-30 NOTE — TELEPHONE ENCOUNTER
Sent updated cpap orders and associated dx and have printed pt's old sleep study to send for documentation.

## 2020-05-07 ENCOUNTER — TELEPHONE (OUTPATIENT)
Dept: ELECTROPHYSIOLOGY | Facility: CLINIC | Age: 70
End: 2020-05-07

## 2020-05-07 DIAGNOSIS — M50.30 DDD (DEGENERATIVE DISC DISEASE), CERVICAL: ICD-10-CM

## 2020-05-07 RX ORDER — METOPROLOL SUCCINATE 25 MG/1
25 TABLET, EXTENDED RELEASE ORAL 2 TIMES DAILY
Qty: 180 TABLET | Refills: 3 | Status: SHIPPED | OUTPATIENT
Start: 2020-05-07 | End: 2020-12-16

## 2020-05-07 NOTE — TELEPHONE ENCOUNTER
Spoke with patient. He will be out of town and needs rx for metoprolol sent to [facility]. He will be gone for 6 months. Rx sent to Dr Simon for signature.

## 2020-05-07 NOTE — TELEPHONE ENCOUNTER
----- Message from Anika Contreras sent at 5/7/2020 11:28 AM CDT -----  Contact: Pt called    Pt requesting to speak the nurse and he's a pt of Dr. Smion. Please call pt @ [phone]. Thank you.

## 2020-11-12 DIAGNOSIS — I48.0 PAF (PAROXYSMAL ATRIAL FIBRILLATION): Primary | ICD-10-CM

## 2020-12-14 ENCOUNTER — PATIENT OUTREACH (OUTPATIENT)
Dept: ADMINISTRATIVE | Facility: OTHER | Age: 70
End: 2020-12-14

## 2020-12-14 NOTE — PROGRESS NOTES
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     HPI:     Dewayne is a 70 y.o. male with obesity, Sleep Apnea (on CPAP), atrial fibrillation, bradycardia here for annual follow up.     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   Has had atrial fibrillation, first diagnosed in late 20's. Attributed to caffeine + lack of sleep.   No recurrence until 1/01. Was seeing Dr. Bolton. Ultimately converted spontaneously. His bp was elevated at that time, placed on Atacand, noted cough. Switched to beta blocker.   6/06 noted to have palpitations, work-up indicated PVC's.  Did well until 9/13, developed recurrence. Xarelto and beta blocker initiated >>  converted back to nsr.  At f/u, as he was feeling well, and had CHADSVASc of 0, Xarelto was discontinued and aspirin 81 mg initiated.  Had not taken Flecainide since 2/16.   Clinic visit 9/2019: Has had one episode of palpitations lasting a couple of minutes, 6 months ago.  Took Flecainide >> resolved.  No sustained arrhythmias.    Update (12/16/2020):    Today he is feeling well. No sustained palpitations. Very brief palps from time to time which he believes are like PVCs. Mr. Buchanan reports no chest pain with exertion or at rest, palpitations, SOB, YAN, dizziness, or syncope.  [place]     On ASA 325mg daily. CHADSVASc 1 (age). He takes flecainide PIP but has not needed this since 3/2019. Also toprol 25mg daily.     I have personally reviewed the patient's EKG today, which shows 56bpm. MO interval is 148. QRS is 84. QTc is 434.    Relevant Cardiac Test Results:    2D Echo (12/16/2019):  · Concentric left ventricular remodeling. Normal left ventricular systolic function. The estimated ejection fraction is 60%  · Normal LV diastolic function.  · Normal right  ventricular systolic function.  · Normal central venous pressure (3 mm Hg).  · The estimated PA systolic pressure is 29 mm Hg    Current Outpatient Medications   Medication Sig    aspirin 325 MG tablet Take 325 mg by mouth once daily.    calcium carbonate 220 mg capsule Take 250 mg by mouth 2 (two) times daily with meals.    cholecalciferol, vitamin D3, 2,000 unit Cap Take 1 capsule by mouth once daily.    DULoxetine (CYMBALTA) 30 MG capsule Take 1 capsule (30 mg total) by mouth once daily.    ferrous sulfate 325 (65 FE) MG EC tablet Take 325 mg by mouth as needed.    flecainide (TAMBOCOR) 150 MG Tab Take 2 tablets (300 mg total) by mouth daily as needed.    gabapentin (NEURONTIN) 300 MG capsule Take 2 capsules (600 mg total) by mouth 3 (three) times daily.    ibuprofen (ADVIL,MOTRIN) 800 MG tablet Take 800 mg by mouth every 6 (six) hours as needed for Pain.    ketoconazole (NIZORAL) 2 % cream Apply to the affected areas on under arms twice daily as needed for flare.    methylPREDNISolone (MEDROL DOSEPACK) 4 mg tablet Take as directed per package    metoprolol succinate (TOPROL-XL) 25 MG 24 hr tablet Take 1 tablet (25 mg total) by mouth 2 (two) times daily. Take 25mg    MULTIVITAMIN W-MINERALS/LUTEIN (CENTRUM SILVER ORAL) Take by mouth.    omega-3 fatty acids-vitamin E (SUPER EPA) 1,000-5 mg-unit Cap Take 1 capsule by mouth once daily.     oxyCODONE-acetaminophen (PERCOCET)  mg per tablet Take 1 tablet by mouth every 4 (four) hours as needed for pain.    traMADol (ULTRAM) 50 mg tablet Take 50 mg by mouth every 6 (six) hours as needed for Pain (Take 2 tablets 3 times a day).    triamcinolone acetonide 0.1% (KENALOG) 0.1 % cream Apply to affected area twice a day    diazePAM (VALIUM) 5 MG tablet Take 1 tablet (5 mg total) by mouth every 6 (six) hours as needed (muscle spasms).     No current facility-administered medications for this visit.      Facility-Administered Medications Ordered in Other  "Visits   Medication    mupirocin 2 % ointment       Review of Systems   Constitution: Negative for malaise/fatigue.   Cardiovascular: Negative for chest pain, dyspnea on exertion, irregular heartbeat, leg swelling and palpitations.   Respiratory: Negative for shortness of breath.    Hematologic/Lymphatic: Negative for bleeding problem.   Skin: Negative for rash.   Musculoskeletal: Negative for myalgias.   Gastrointestinal: Negative for hematemesis, hematochezia and nausea.   Genitourinary: Negative for hematuria.   Neurological: Negative for light-headedness.   Psychiatric/Behavioral: Negative for altered mental status.   Allergic/Immunologic: Negative for persistent infections.     Objective:        BP (!) 142/80   Pulse (!) 56   Ht 6' 1" (1.854 m)   Wt 108.7 kg (239 lb 10.2 oz)   BMI 31.62 kg/m²     Physical Exam   Constitutional: He is oriented to person, place, and time. He appears well-developed and well-nourished.   HENT:   Head: Normocephalic.   Nose: Nose normal.   Eyes: Pupils are equal, round, and reactive to light.   Cardiovascular: Normal rate and regular rhythm.   Pulmonary/Chest: Breath sounds normal. No respiratory distress.   Abdominal: Normal appearance.   Musculoskeletal: Normal range of motion.         General: No edema.   Neurological: He is alert and oriented to person, place, and time.   Skin: Skin is warm and dry. No erythema.   Psychiatric: He has a normal mood and affect. His speech is normal and behavior is normal.   Nursing note and vitals reviewed.      Lab Results   Component Value Date     07/30/2019    K 4.3 07/30/2019    BUN 15 07/30/2019    CREATININE 0.8 07/30/2019    ALT 23 07/30/2019    AST 28 07/30/2019    HGB 12.7 (L) 08/07/2019    HCT 39.1 (L) 08/07/2019    TSH 3.204 04/06/2019    LDLCALC 102.2 07/30/2019       Recent Labs   Lab 11/20/18  1420 04/18/19  1256 07/30/19  0747   INR 1.0 1.0 1.0         Assessment:     1. Paroxysmal atrial fibrillation    2. Obesity due " to excess calories with serious comorbidity, unspecified classification    3. ALLAN on CPAP    4. 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   He is doing well from a rhythm standpoint. On flecainide pill-in-pocket strategy. Has not needed this since early 2019. On metoprolol 25mg daily.  CHADSVASc 1. On ASA 325mg daily.  I advised that he could switch to 81mg. He will consider, as he also uses ASA for inflammation.     Continue current medications.  RTC 1 yr, sooner if needed.    *A copy of this note has been sent to Dr. Simon*    Follow up in about 1 year (around 12/16/2021).    ------------------------------------------------------------------    RULA Burk, NP-C  Cardiac Electrophysiology

## 2020-12-16 ENCOUNTER — OFFICE VISIT (OUTPATIENT)
Dept: ELECTROPHYSIOLOGY | Facility: CLINIC | Age: 70
End: 2020-12-16
Payer: MEDICARE

## 2020-12-16 VITALS
HEART RATE: 56 BPM | SYSTOLIC BLOOD PRESSURE: 142 MMHG | DIASTOLIC BLOOD PRESSURE: 80 MMHG | WEIGHT: 239.63 LBS | HEIGHT: 73 IN | BODY MASS INDEX: 31.76 KG/M2

## 2020-12-16 DIAGNOSIS — I48.0 PAF (PAROXYSMAL ATRIAL FIBRILLATION): ICD-10-CM

## 2020-12-16 DIAGNOSIS — G47.33 OSA ON CPAP: ICD-10-CM

## 2020-12-16 DIAGNOSIS — I48.0 PAROXYSMAL ATRIAL FIBRILLATION: Primary | ICD-10-CM

## 2020-12-16 DIAGNOSIS — E66.09 OBESITY DUE TO EXCESS CALORIES WITH SERIOUS COMORBIDITY, UNSPECIFIED CLASSIFICATION: Chronic | ICD-10-CM

## 2020-12-16 DIAGNOSIS — M50.30 DDD (DEGENERATIVE DISC DISEASE), CERVICAL: ICD-10-CM

## 2020-12-16 PROCEDURE — 99214 OFFICE O/P EST MOD 30 MIN: CPT | Mod: PBBFAC | Performed by: NURSE PRACTITIONER

## 2020-12-16 PROCEDURE — 93010 RHYTHM STRIP: ICD-10-PCS | Mod: S$PBB,,, | Performed by: INTERNAL MEDICINE

## 2020-12-16 PROCEDURE — 93010 ELECTROCARDIOGRAM REPORT: CPT | Mod: S$PBB,,, | Performed by: INTERNAL MEDICINE

## 2020-12-16 PROCEDURE — 99214 OFFICE O/P EST MOD 30 MIN: CPT | Mod: S$PBB,,, | Performed by: NURSE PRACTITIONER

## 2020-12-16 PROCEDURE — 93005 ELECTROCARDIOGRAM TRACING: CPT | Mod: PBBFAC | Performed by: INTERNAL MEDICINE

## 2020-12-16 PROCEDURE — 99214 PR OFFICE/OUTPT VISIT, EST, LEVL IV, 30-39 MIN: ICD-10-PCS | Mod: S$PBB,,, | Performed by: NURSE PRACTITIONER

## 2020-12-16 PROCEDURE — 99999 PR PBB SHADOW E&M-EST. PATIENT-LVL IV: CPT | Mod: PBBFAC,,, | Performed by: NURSE PRACTITIONER

## 2020-12-16 PROCEDURE — 99999 PR PBB SHADOW E&M-EST. PATIENT-LVL IV: ICD-10-PCS | Mod: PBBFAC,,, | Performed by: NURSE PRACTITIONER

## 2020-12-16 RX ORDER — FLECAINIDE ACETATE 150 MG/1
300 TABLET ORAL DAILY PRN
Qty: 20 TABLET | Refills: 6 | Status: SHIPPED | OUTPATIENT
Start: 2020-12-16 | End: 2021-12-16

## 2020-12-16 RX ORDER — METOPROLOL SUCCINATE 25 MG/1
25 TABLET, EXTENDED RELEASE ORAL 2 TIMES DAILY
Qty: 180 TABLET | Refills: 3 | Status: SHIPPED | OUTPATIENT
Start: 2020-12-16

## 2020-12-16 RX ORDER — IBUPROFEN 800 MG/1
800 TABLET ORAL EVERY 6 HOURS PRN
COMMUNITY

## 2020-12-16 NOTE — LETTER
December 16, 2020      Real Simon MD  [address]  [facility] [address]           [id number]  [address]  [facility] [address]  Phone: [phone]  Fax: [phone]          Patient: Franko HALL MD Dewayne   MR Number: [id number]   YOB: 1950   Date of Visit: 12/16/2020       Dear Dr. Real Simon:    Thank you for referring Franko Buchanan to me for evaluation. Attached you will find relevant portions of my assessment and plan of care.    If you have questions, please do not hesitate to call me. I look forward to following Franko Buchanan along with you.    Sincerely,    Cecille Curtis, VICKIE    Enclosure  CC:  No Recipients    If you would like to receive this communication electronically, please contact [email or web address] or [phone] to request more information on [facility] access.    For providers and/or their staff who would like to refer a patient to [facility], please contact us through our one-stop-shop provider referral line, [facility], at [phone].    If you feel you have received this communication in error or would no longer like to receive these types of communications, please e-mail [email or web address]

## 2020-12-18 ENCOUNTER — OFFICE VISIT (OUTPATIENT)
Dept: FAMILY MEDICINE | Facility: CLINIC | Age: 70
End: 2020-12-18
Payer: MEDICARE

## 2020-12-18 ENCOUNTER — OFFICE VISIT (OUTPATIENT)
Dept: OPTOMETRY | Facility: CLINIC | Age: 70
End: 2020-12-18
Payer: MEDICARE

## 2020-12-18 VITALS
OXYGEN SATURATION: 98 % | SYSTOLIC BLOOD PRESSURE: 150 MMHG | BODY MASS INDEX: 31.32 KG/M2 | HEIGHT: 73 IN | WEIGHT: 236.31 LBS | DIASTOLIC BLOOD PRESSURE: 86 MMHG | HEART RATE: 56 BPM

## 2020-12-18 DIAGNOSIS — Z13.5 GLAUCOMA SCREENING: ICD-10-CM

## 2020-12-18 DIAGNOSIS — Z12.5 SCREENING FOR MALIGNANT NEOPLASM OF PROSTATE: ICD-10-CM

## 2020-12-18 DIAGNOSIS — H52.4 PRESBYOPIA: ICD-10-CM

## 2020-12-18 DIAGNOSIS — Z00.00 ROUTINE GENERAL MEDICAL EXAMINATION AT A HEALTH CARE FACILITY: Primary | ICD-10-CM

## 2020-12-18 DIAGNOSIS — Z13.220 ENCOUNTER FOR LIPID SCREENING FOR CARDIOVASCULAR DISEASE: ICD-10-CM

## 2020-12-18 DIAGNOSIS — Z13.6 ENCOUNTER FOR LIPID SCREENING FOR CARDIOVASCULAR DISEASE: ICD-10-CM

## 2020-12-18 DIAGNOSIS — N28.1 RENAL CYST: ICD-10-CM

## 2020-12-18 DIAGNOSIS — R73.09 ABNORMAL GLUCOSE: ICD-10-CM

## 2020-12-18 DIAGNOSIS — H35.363 DRUSEN OF MACULA OF BOTH EYES: ICD-10-CM

## 2020-12-18 DIAGNOSIS — H25.13 NUCLEAR SCLEROSIS, BILATERAL: Primary | ICD-10-CM

## 2020-12-18 DIAGNOSIS — R03.0 ELEVATED BLOOD PRESSURE READING: ICD-10-CM

## 2020-12-18 DIAGNOSIS — G47.33 OSA ON CPAP: ICD-10-CM

## 2020-12-18 PROCEDURE — 99397 PER PM REEVAL EST PAT 65+ YR: CPT | Mod: S$PBB,,, | Performed by: FAMILY MEDICINE

## 2020-12-18 PROCEDURE — 92015 DETERMINE REFRACTIVE STATE: CPT | Mod: ,,, | Performed by: OPTOMETRIST

## 2020-12-18 PROCEDURE — 99999 PR PBB SHADOW E&M-EST. PATIENT-LVL III: ICD-10-PCS | Mod: PBBFAC,,, | Performed by: OPTOMETRIST

## 2020-12-18 PROCEDURE — 92014 COMPRE OPH EXAM EST PT 1/>: CPT | Mod: S$PBB,,, | Performed by: OPTOMETRIST

## 2020-12-18 PROCEDURE — 99999 PR PBB SHADOW E&M-EST. PATIENT-LVL III: CPT | Mod: PBBFAC,,, | Performed by: OPTOMETRIST

## 2020-12-18 PROCEDURE — 99213 OFFICE O/P EST LOW 20 MIN: CPT | Mod: PBBFAC,PO | Performed by: OPTOMETRIST

## 2020-12-18 PROCEDURE — 92014 PR EYE EXAM, EST PATIENT,COMPREHESV: ICD-10-PCS | Mod: S$PBB,,, | Performed by: OPTOMETRIST

## 2020-12-18 PROCEDURE — 99999 PR PBB SHADOW E&M-EST. PATIENT-LVL IV: CPT | Mod: PBBFAC,,, | Performed by: FAMILY MEDICINE

## 2020-12-18 PROCEDURE — 99999 PR PBB SHADOW E&M-EST. PATIENT-LVL IV: ICD-10-PCS | Mod: PBBFAC,,, | Performed by: FAMILY MEDICINE

## 2020-12-18 PROCEDURE — 92015 PR REFRACTION: ICD-10-PCS | Mod: ,,, | Performed by: OPTOMETRIST

## 2020-12-18 PROCEDURE — 99397 PR PREVENTIVE VISIT,EST,65 & OVER: ICD-10-PCS | Mod: S$PBB,,, | Performed by: FAMILY MEDICINE

## 2020-12-18 PROCEDURE — 99214 OFFICE O/P EST MOD 30 MIN: CPT | Mod: PBBFAC,27,PO | Performed by: FAMILY MEDICINE

## 2020-12-18 RX ORDER — DULOXETIN HYDROCHLORIDE 30 MG/1
30 CAPSULE, DELAYED RELEASE ORAL 2 TIMES DAILY
Qty: 180 CAPSULE | Refills: 11 | Status: SHIPPED | OUTPATIENT
Start: 2020-12-18

## 2020-12-18 NOTE — PATIENT INSTRUCTIONS
"Lifestyle choices to lower blood pressure    Diet  DASH diet--high in fruits/vegetables and low in fat and saturated fat: 8-14 points  Salt restriction--2.3 grams sodium/day: 2-8 points    Weight loss--5-20 points per 20 lbs lost.    Exercise--30min most days/wk: 4-9 points    Limit Alcohol--2/day men; 1/day women: 2-4 points.        The DASH Diet: Healthy Eating to Control Your Blood Pressure     What is the DASH diet?    DASH stands for Dietary Approaches to Stop Hypertension. It is a balanced eating plan that your family doctor might recommend to help you lower your blood pressure. The DASH diet:   Is low in salt, saturated fat, cholesterol and total fat.    Emphasizes fruits, vegetables, and fat-free or low-fat milk and milk products.    Includes whole grains, fish, poultry and nuts.    Limits the amount of red meat, sweets, added sugars and sugar-containing beverages in your diet.    Is rich in potassium, magnesium and calcium, as well as protein and fiber.      How can the DASH diet help me stay healthy?  Getting too much sodium (salt) in your diet can contribute to high blood pressure (also called hypertension). Some salt is in foods naturally, and some salt is added to food when it is processed or prepared. Following the DASH diet can help you lower your blood pressure, or prevent high blood pressure, by reducing the amount of sodium in your diet to less than 2,300 mg per day.  The fruits, vegetables and whole grains recommended in the DASH diet provide many other elements of a healthy diet, such as lycopene, beta-carotene and isoflavones. These can help protect your body against common health conditions, such as cancer, osteoporosis, stroke and diabetes. Following the DASH diet can also help reduce your risk of heart disease by lowering your low-density lipoprotein (LDL, or "bad") cholesterol level.  Following the DASH diet may drop your blood pressure by a few points in as little as 2 weeks. However, " you should not stop taking your blood pressure medicine, or any other prescribed medicine, without talking to your doctor first.    What kinds of foods are included in the DASH diet?  The DASH diet is nutritionally balanced for good health. You dont need to buy any special foods or pills, or cook with any special recipes, to follow the DASH diet. If you follow the DASH diet, you will eat about 2,000 calories each day. These calories will come from a variety of foods.    Where is sodium in my diet?  Food Serving Sodium Content   ¼ teaspoon table salt 575 mg   ½ teaspoon table salt 1,150 mg   1 teaspoon table salt 2,300 mg   1 hot dog 460 mg   1 regular fast-food hamburger 600 mg   2 ounces processed cheese 600 mg   1 tablespoon soy sauce 900 mg   1 serving frozen pizza with meat and vegetables 982 mg   8 ounces regular potato chips 1,192 mg     The DASH diet   Whole grains (6 to 8 servings a day)    Vegetables (4 to 5 servings a day)    Fruits (4 to 5 servings a day)    Low-fat or fat-free milk and milk products (2 to 3 servings a day)    Lean meats, poultry and fish (6 or fewer servings a day)    Nuts, seeds and beans (4 to 5 servings a week)    Fats and oils (2 to 3 servings a day)    Sweets, preferably low-fat or fat-free (5 or fewer a week)    Sodium (no more than 2,300 mg a day)   You can adapt the DASH diet to meet your needs. For example:   If you drink alcohol, limit yourself to 2 drinks or less per day for men and 1 drink or less per day for women.    To reduce your blood pressure even more, replace some of the carbohydrates in the DASH diet with low-fat protein and unsaturated fats.    If you need to lose weight, reduce the number of calories you eat to about 1,600 per day.    Follow a lower-sodium version (no more than 1,500 mg daily) if you are 40 years of age or older, you are  or you already have high blood pressure.      How can I change my eating habits?  Dont be  "discouraged if following the DASH diet is difficult at first. Start with small, achievable goals. The following ideas can help you make healthy changes:   Pay attention to your current eating habits. Make a list of everything you eat for 2 or 3 days. Compare this list to the DASH diet recommendations above and see what changes you need to make in your diet.    Make one change at a time. For example, start by choosing lower-fat versions of your favorite foods or adding more whole grains to your meals.    Learn what makes a serving for each type of food. For example, 1 serving equals 1 slice of bread, 8 ounces of milk, 1 cup of raw vegetables or 1/2 cup of cooked vegetables. For more serving sizes, go to the [facility] site. Dont have a measuring cup? One serving (3 ounces) of meat or poultry is about the size of a deck of cards. One serving (1/2 cup) of rice or potato looks like half a baseball, and a serving of cheese is about the size of 4 stacked dice.    If eating more fruits and vegetables gives you gas, bloating or diarrhea, increase these foods slowly. You can also talk to your family doctor about taking over-the-counter medicines to reduce these symptoms until your body adjusts.    Get more exercise. Physical activity helps lower your blood pressure and can help you lose more weight.    Use salt-free seasonings, such as spices and herbs, to add flavor to your recipes and reduce or eliminate salt.    Include as many fresh and unprocessed foods as possible. Cut back on frozen dinners, packaged mixes, canned soups and bottled salad dressings, which are often high in sodium.    When buying canned, frozen or processed foods, check nutrition labels for the amount of sodium, sugar and saturated fat. Look for the phrases "no salt added," "sodium-free," "low sodium" or "very low sodium" on food packages. Choose foods with monounsaturated and polyunsaturated fats.    Steam, grill, poach, roast or stir-baker foods. " Use low-sodium broth or water instead of butter or oil for sautéing.    When you eat at a restaurant, ask how foods are prepared. Ask if your order can be made without added salt. Dont add salty condiments, such as ketchup, mustard, pickles or sauces, to your food.   Other Organizations   Centers for Disease Control and Prevention    National Heart, Lung, and Blood [facility]   Written by [email or web address] editorial staff  Reviewed/Updated: 02/11  Created: 08/09

## 2020-12-18 NOTE — PROGRESS NOTES
(Portions of this note were dictated using voice recognition software and may contain dictation related errors in spelling/grammar/syntax not found on text review)    CC:   Chief Complaint   Patient presents with    Annual Exam     requesting renal US.     Medication Refill     Duloxetine; requesting increase.        HPI: 70 y.o. male here for annual exam    Medical history includes:   sleep apnea, on APAP.  Uses nightly, can't really function without at.  This is benefitting him significantly.  Denies any complications with usage of PAP.  Needs an order for new supplies    Atrial fibrillation, paroxysmal, on flecainide as a pill in the pocket treatment, rarely needs this.  On metoprolol as well for rate control.  Last visit with Cardiology 12/16/2020    Rheumatoid arthritis:  Initial workup in 2015 secondary to multi joint pains and weakness.  Initial thought for PMR but not complete response to steroids as expected.  Later diagnosed with seronegative RA.  Has tried DMARD treatment with methotrexate and then Humira but currently is off all meds for this.    Degenerative disc disease cervical and lumbar.  Followed by neuro surgery  Had lumbar surgery in 2000 and again 2009.  Had progressive neck pain and radiculopathy symptoms, prompting C4-5 and C5-6 ACDF in 2018.  Subsequently started getting lumbar radiculopathy symptoms and August 2019 had lumbar surgery:  L3 through S1 fusion.  His neurosurgeon had started him on Percocet for pain control.  Currently is on Percocet just once in a while for breakthrough symptoms but more regularly takes tramadol 2 to 3 times a day from his pain management physician, Dr. Corado/   His [facility]sur[facility] has [facility] for appointment out of the country, and patient is subsequently established with Dr. 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 Neuro surgery report note reviewed from 09/10/2019.  Concerning that his progress is slow and that it was not felt that he would be able to adequately perform the duties required for his specialty given need to stand for long time and surgery and frequent sitting and standing and movement required in clinic.  States that if he is walking around he feels little bit better but just standing and being in a stationary position is especially difficult.  He feels like even standing and bending over for a few minutes starts causing him significant pain in his lower back, radiating to the thighs.  He also gets numbness in his right foot.  He will get left arm paresthesias as well worse with neck rotations.  He notices also gripping difficulties and stiffness in his hands from arthritis change.  He specifically mentions things like doing a gynecologic speculum examination and doing obstetric examinations is becoming much more difficult because of gripping, hand and wrist motions, and rotational motions needed during examination.    Fatigue, multifactorial with possible situational depression and anxiety symptoms, was on Cymbalta 60 daily although currently had weaned off in the past was interested in restarting.  Currently back on Cymbalta 30 mg daily..  Interested in increasing dose to 30 b.i.d.    Also he had seen a urologist at one point for kidney stones after CT had noticed this along with bilateral renal cyst.  Follow-up ultrasound showed stability of the cyst.  Requesting repeat ultrasound     BP elevated today, typically does not have hypertension.  Has gained some weight from recent travel, exercise a little bit by walking the dogs.  Has been eating a bit more than usual.  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    Sleep apnea        Past Surgical History:   Procedure Laterality Date    ANTERIOR CERVICAL DISCECTOMY W/ FUSION Right 11/28/2018    Procedure: C4-5 and C5-6 ACDF (DISCECTOMY , SPINE , CERVICAL, ANTERIOR APPROACH W/FUSION);  Surgeon: José Miguel Hitchcock MD;  Location: [facility];  Service: Neurosurgery;  Laterality: Right;  Length of surgery: 1-2 hours  Length of stay: 1-2 hours  Clute: II  ASA: I  Brace: [place] J  NeuroMonitoring: EMG, SEP  Radiology: C-arm  Bed: Regular  Position: Supine    BACK SURGERY      1990's and 2009; last by Naldo    COLONOSCOPY N/A 1/30/2018    Procedure: COLONOSCOPY;  Surgeon: Nadia Scott MD;  Location: [facility];  Service: Endoscopy;  Laterality: N/A;    COSMETIC SURGERY      Focal Laser       Right eye    HERNIA REPAIR Right     inguinal    MINIMALLY INVASIVE FUSION OF SPINE Right 8/7/2019    Procedure: FUSION, SPINE, MINIMALLY INVASIVE- Direct lateral fusion with posterior pedicle screws: Lateral LEFT L3-4 and L4-5; Posterior RIGHT MIS TLIF L5-S1;  Surgeon: José Miguel Hitchcock MD;  Location: [address];  Service: Neurosurgery;  Laterality: Right;    SKIN CANCER EXCISION      SPINE SURGERY      l3-4/ l5-s1  (x 2)    TONSILLECTOMY         Family History   Problem Relation Age of Onset    Colon polyps Father     Cancer Father         leukemia    Glaucoma Mother     Thyroid disease Sister         hashimoto    Cancer Sister         renal    No Known Problems Sister     Heart failure Maternal Grandfather     Cataracts Maternal Grandmother     Alzheimer's disease Maternal Grandmother     No Known Problems Maternal Aunt     No Known Problems Maternal Uncle     No Known Problems Paternal Aunt     No Known Problems Paternal Uncle     No Known Problems Paternal  Grandmother     Cancer Paternal Grandfather         colon    Diabetes Neg Hx     Heart disease Neg Hx     Amblyopia Neg Hx     Blindness Neg Hx     Macular degeneration Neg Hx     Retinal detachment Neg Hx     Strabismus Neg Hx     Hypertension Neg Hx     Stroke Neg Hx     Melanoma Neg Hx        Social History     Tobacco Use    Smoking status: Never Smoker    Smokeless tobacco: Never Used   Substance Use Topics    Alcohol use: Yes     Alcohol/week: 5.0 standard drinks     Types: 6 Standard drinks or equivalent per week     Frequency: 2-4 times a month     Drinks per session: 1 or 2     Binge frequency: Never     Comment: 3 X WEEK     Drug use: No       Lab Results   Component Value Date    WBC 13.11 (H) 08/07/2019    HGB 12.7 (L) 08/07/2019    HCT 39.1 (L) 08/07/2019    MCV 93 08/07/2019     08/07/2019    CHOL 172 07/30/2019    TRIG 69 07/30/2019    HDL 56 07/30/2019    ALT 23 07/30/2019    AST 28 07/30/2019    BILITOT 0.7 07/30/2019    ALKPHOS 80 07/30/2019     07/30/2019    K 4.3 07/30/2019     07/30/2019    CREATININE 0.8 07/30/2019    ESTGFRAFRICA >60 07/30/2019    EGFRNONAA >60 07/30/2019    CALCIUM 9.9 07/30/2019    ALBUMIN 4.0 07/30/2019    BUN 15 07/30/2019    CO2 25 07/30/2019    TSH 3.204 04/06/2019    PSA 0.49 08/01/2018    INR 1.0 07/30/2019    HGBA1C 5.3 07/30/2019    LDLCALC 102.2 07/30/2019     07/30/2019                 ROS:  GENERAL: No fever, chills, fatigability or weight loss.  SKIN: No rashes, no itching.  HEAD: No headaches.  EYES: No visual changes  EARS: No ear pain or changes in hearing.  NOSE: No congestion or rhinorrhea.  MOUTH & THROAT: No hoarseness, change in voice, or sore throat.  NODES: Denies swollen glands.  CHEST: Denies YAN, cyanosis, wheezing, cough and sputum production.  CARDIOVASCULAR: Denies chest pain, PND, orthopnea.  ABDOMEN: No nausea, vomiting, or changes in bowel function.  URINARY: No flank pain, dysuria or  hematuria.  PERIPHERAL VASCULAR: No claudication or cyanosis.  MUSCULOSKELETAL:  Above  NEUROLOGIC:  Above.  PSYCHIATRIC:  Above    Vital signs reviewed  PE:   APPEARANCE: Well nourished, well developed, in no acute distress.    HEAD: Normocephalic, atraumatic.  EYES: PERRL. EOMI.   Conjunctivae noninjected.  EARS: TM's intact. Light reflex normal. No retraction or perforation  NOSE: Mucosa pink. Airway clear.  MOUTH & THROAT: No tonsillar enlargement. No pharyngeal erythema or exudate.   NECK: Supple with no cervical lymphadenopathy.  No carotid bruits.  No thyromegaly  CHEST: Good inspiratory effort. Lungs clear to auscultation with no wheezes or crackles.  CARDIOVASCULAR: Normal S1, S2. No rubs, murmurs, or gallops.  ABDOMEN: Bowel sounds normal. Not distended. Soft. No tenderness or masses. No organomegaly.  EXTREMITIES: No edema, cyanosis, or clubbing.      IMPRESSION   1. Routine general medical examination at a health care facility    2. Renal cyst    3. ALLAN on CPAP    4. Abnormal glucose    5. Screening for malignant neoplasm of prostate    6. Encounter for lipid screening for cardiovascular disease    7. Elevated blood pressure reading            PLAN   Elevated blood pressure:  Send some readings from home over the next couple of weeks.  Discussed dash diet, weight loss.  He is on metoprolol once a day 25 mg for is atrial fibrillation, to be continued    Increased Cymbalta 30 mg b.i.d.    Can recheck renal ultrasound for cyst follow-up although we discussed given simple cysts, if normal, we can likely hold off on further routine follow-up unless there is a problem    Labs below    Continue CPAP          Orders Placed This Encounter   Procedures    US Retroperitoneal Complete    CBC Auto Differential    Comprehensive Metabolic Panel    Lipid Panel    PSA, Screening    Hemoglobin A1C         HEALTH SCREENINGS  Immunizations:  Tdap 2014  PCV 10/17/17  PPSV 2018  Flu  Declines right now, will get later  in year.    Age/Gender Appropriate screenings:  Prostate screening : PSA as above 0.49 in 2018.  , defers testing this year  Colonoscopy 2018.  Internal hemorrhoids, otherwise normal.  Return in 5 years

## 2020-12-19 ENCOUNTER — LAB VISIT (OUTPATIENT)
Dept: LAB | Facility: HOSPITAL | Age: 70
End: 2020-12-19
Attending: FAMILY MEDICINE
Payer: MEDICARE

## 2020-12-19 DIAGNOSIS — Z00.00 ROUTINE GENERAL MEDICAL EXAMINATION AT A HEALTH CARE FACILITY: ICD-10-CM

## 2020-12-19 DIAGNOSIS — Z13.6 ENCOUNTER FOR LIPID SCREENING FOR CARDIOVASCULAR DISEASE: ICD-10-CM

## 2020-12-19 DIAGNOSIS — Z12.5 SCREENING FOR MALIGNANT NEOPLASM OF PROSTATE: ICD-10-CM

## 2020-12-19 DIAGNOSIS — R73.09 ABNORMAL GLUCOSE: ICD-10-CM

## 2020-12-19 DIAGNOSIS — N28.1 RENAL CYST: ICD-10-CM

## 2020-12-19 DIAGNOSIS — G47.33 OSA ON CPAP: ICD-10-CM

## 2020-12-19 DIAGNOSIS — Z13.220 ENCOUNTER FOR LIPID SCREENING FOR CARDIOVASCULAR DISEASE: ICD-10-CM

## 2020-12-19 LAB
ALBUMIN SERPL BCP-MCNC: 4.1 G/DL (ref 3.5–5.2)
ALP SERPL-CCNC: 90 U/L (ref 55–135)
ALT SERPL W/O P-5'-P-CCNC: 21 U/L (ref 10–44)
ANION GAP SERPL CALC-SCNC: 9 MMOL/L (ref 8–16)
AST SERPL-CCNC: 29 U/L (ref 10–40)
BASOPHILS # BLD AUTO: 0.04 K/UL (ref 0–0.2)
BASOPHILS NFR BLD: 0.6 % (ref 0–1.9)
BILIRUB SERPL-MCNC: 1.1 MG/DL (ref 0.1–1)
BUN SERPL-MCNC: 18 MG/DL (ref 8–23)
CALCIUM SERPL-MCNC: 9.4 MG/DL (ref 8.7–10.5)
CHLORIDE SERPL-SCNC: 104 MMOL/L (ref 95–110)
CHOLEST SERPL-MCNC: 173 MG/DL (ref 120–199)
CHOLEST/HDLC SERPL: 3.2 {RATIO} (ref 2–5)
CO2 SERPL-SCNC: 26 MMOL/L (ref 23–29)
COMPLEXED PSA SERPL-MCNC: 0.28 NG/ML (ref 0–4)
CREAT SERPL-MCNC: 0.9 MG/DL (ref 0.5–1.4)
DIFFERENTIAL METHOD: NORMAL
EOSINOPHIL # BLD AUTO: 0.2 K/UL (ref 0–0.5)
EOSINOPHIL NFR BLD: 3.1 % (ref 0–8)
ERYTHROCYTE [DISTWIDTH] IN BLOOD BY AUTOMATED COUNT: 13.1 % (ref 11.5–14.5)
EST. GFR  (AFRICAN AMERICAN): >60 ML/MIN/1.73 M^2
EST. GFR  (NON AFRICAN AMERICAN): >60 ML/MIN/1.73 M^2
ESTIMATED AVG GLUCOSE: 103 MG/DL (ref 68–131)
GLUCOSE SERPL-MCNC: 93 MG/DL (ref 70–110)
HBA1C MFR BLD HPLC: 5.2 % (ref 4–5.6)
HCT VFR BLD AUTO: 43.6 % (ref 40–54)
HDLC SERPL-MCNC: 54 MG/DL (ref 40–75)
HDLC SERPL: 31.2 % (ref 20–50)
HGB BLD-MCNC: 14.7 G/DL (ref 14–18)
IMM GRANULOCYTES # BLD AUTO: 0.01 K/UL (ref 0–0.04)
IMM GRANULOCYTES NFR BLD AUTO: 0.1 % (ref 0–0.5)
LDLC SERPL CALC-MCNC: 94.8 MG/DL (ref 63–159)
LYMPHOCYTES # BLD AUTO: 2.7 K/UL (ref 1–4.8)
LYMPHOCYTES NFR BLD: 37.7 % (ref 18–48)
MCH RBC QN AUTO: 30.8 PG (ref 27–31)
MCHC RBC AUTO-ENTMCNC: 33.7 G/DL (ref 32–36)
MCV RBC AUTO: 91 FL (ref 82–98)
MONOCYTES # BLD AUTO: 0.5 K/UL (ref 0.3–1)
MONOCYTES NFR BLD: 6.7 % (ref 4–15)
NEUTROPHILS # BLD AUTO: 3.7 K/UL (ref 1.8–7.7)
NEUTROPHILS NFR BLD: 51.8 % (ref 38–73)
NONHDLC SERPL-MCNC: 119 MG/DL
NRBC BLD-RTO: 0 /100 WBC
PLATELET # BLD AUTO: 208 K/UL (ref 150–350)
PMV BLD AUTO: 10.4 FL (ref 9.2–12.9)
POTASSIUM SERPL-SCNC: 4.3 MMOL/L (ref 3.5–5.1)
PROT SERPL-MCNC: 7.5 G/DL (ref 6–8.4)
RBC # BLD AUTO: 4.77 M/UL (ref 4.6–6.2)
SODIUM SERPL-SCNC: 139 MMOL/L (ref 136–145)
TRIGL SERPL-MCNC: 121 MG/DL (ref 30–150)
WBC # BLD AUTO: 7.06 K/UL (ref 3.9–12.7)

## 2020-12-19 PROCEDURE — 83036 HEMOGLOBIN GLYCOSYLATED A1C: CPT

## 2020-12-19 PROCEDURE — 84153 ASSAY OF PSA TOTAL: CPT

## 2020-12-19 PROCEDURE — 36415 COLL VENOUS BLD VENIPUNCTURE: CPT

## 2020-12-19 PROCEDURE — 80061 LIPID PANEL: CPT

## 2020-12-19 PROCEDURE — 80053 COMPREHEN METABOLIC PANEL: CPT

## 2020-12-19 PROCEDURE — 85025 COMPLETE CBC W/AUTO DIFF WBC: CPT

## 2020-12-28 ENCOUNTER — HOSPITAL ENCOUNTER (OUTPATIENT)
Dept: RADIOLOGY | Facility: HOSPITAL | Age: 70
Discharge: HOME | End: 2020-12-28
Attending: FAMILY MEDICINE
Payer: MEDICARE

## 2020-12-28 DIAGNOSIS — N28.1 RENAL CYST: ICD-10-CM

## 2020-12-28 PROCEDURE — 76770 US RETROPERITONEAL COMPLETE: ICD-10-PCS | Mod: 26,,, | Performed by: RADIOLOGY

## 2020-12-28 PROCEDURE — 76770 US EXAM ABDO BACK WALL COMP: CPT | Mod: TC

## 2020-12-28 PROCEDURE — 76770 US EXAM ABDO BACK WALL COMP: CPT | Mod: 26,,, | Performed by: RADIOLOGY

## 2021-01-07 ENCOUNTER — PATIENT MESSAGE (OUTPATIENT)
Dept: FAMILY MEDICINE | Facility: CLINIC | Age: 71
End: 2021-01-07

## 2021-01-08 ENCOUNTER — PATIENT MESSAGE (OUTPATIENT)
Dept: FAMILY MEDICINE | Facility: CLINIC | Age: 71
End: 2021-01-08

## 2021-01-13 ENCOUNTER — OFFICE VISIT (OUTPATIENT)
Dept: DERMATOLOGY | Facility: CLINIC | Age: 71
End: 2021-01-13
Payer: MEDICARE

## 2021-01-13 DIAGNOSIS — L30.4 INTERTRIGO: Primary | ICD-10-CM

## 2021-01-13 DIAGNOSIS — L57.0 AK (ACTINIC KERATOSIS): ICD-10-CM

## 2021-01-13 DIAGNOSIS — D22.9 NEVUS: ICD-10-CM

## 2021-01-13 DIAGNOSIS — L91.8 SKIN TAG: ICD-10-CM

## 2021-01-13 DIAGNOSIS — L82.1 SK (SEBORRHEIC KERATOSIS): ICD-10-CM

## 2021-01-13 DIAGNOSIS — D18.01 CHERRY ANGIOMA: ICD-10-CM

## 2021-01-13 DIAGNOSIS — Z85.828 PERSONAL HISTORY OF SKIN CANCER: ICD-10-CM

## 2021-01-13 DIAGNOSIS — L81.4 LENTIGO: ICD-10-CM

## 2021-01-13 PROCEDURE — 99999 PR PBB SHADOW E&M-EST. PATIENT-LVL III: CPT | Mod: PBBFAC,,, | Performed by: DERMATOLOGY

## 2021-01-13 PROCEDURE — 17000 DESTRUCT PREMALG LESION: CPT | Mod: PBBFAC,PO | Performed by: DERMATOLOGY

## 2021-01-13 PROCEDURE — 99213 OFFICE O/P EST LOW 20 MIN: CPT | Mod: PBBFAC,PO,25 | Performed by: DERMATOLOGY

## 2021-01-13 PROCEDURE — 99213 PR OFFICE/OUTPT VISIT, EST, LEVL III, 20-29 MIN: ICD-10-PCS | Mod: 25,S$PBB,, | Performed by: DERMATOLOGY

## 2021-01-13 PROCEDURE — 17000 DESTRUCT PREMALG LESION: CPT | Mod: S$PBB,,, | Performed by: DERMATOLOGY

## 2021-01-13 PROCEDURE — 17003 DESTRUCT PREMALG LES 2-14: CPT | Mod: S$PBB,,, | Performed by: DERMATOLOGY

## 2021-01-13 PROCEDURE — 17003 DESTRUCT PREMALG LES 2-14: CPT | Mod: PBBFAC,PO | Performed by: DERMATOLOGY

## 2021-01-13 PROCEDURE — 17000 PR DESTRUCTION(LASER SURGERY,CRYOSURGERY,CHEMOSURGERY),PREMALIGNANT LESIONS,FIRST LESION: ICD-10-PCS | Mod: S$PBB,,, | Performed by: DERMATOLOGY

## 2021-01-13 PROCEDURE — 99213 OFFICE O/P EST LOW 20 MIN: CPT | Mod: 25,S$PBB,, | Performed by: DERMATOLOGY

## 2021-01-13 PROCEDURE — 99999 PR PBB SHADOW E&M-EST. PATIENT-LVL III: ICD-10-PCS | Mod: PBBFAC,,, | Performed by: DERMATOLOGY

## 2021-01-13 PROCEDURE — 17003 DESTRUCTION, PREMALIGNANT LESIONS; SECOND THROUGH 14 LESIONS: ICD-10-PCS | Mod: S$PBB,,, | Performed by: DERMATOLOGY

## 2021-01-13 RX ORDER — KETOCONAZOLE 20 MG/G
CREAM TOPICAL
Qty: 60 G | Refills: 3 | Status: SHIPPED | OUTPATIENT
Start: 2021-01-13

## 2021-01-18 ENCOUNTER — PATIENT MESSAGE (OUTPATIENT)
Dept: FAMILY MEDICINE | Facility: CLINIC | Age: 71
End: 2021-01-18

## 2021-01-19 ENCOUNTER — PATIENT MESSAGE (OUTPATIENT)
Dept: FAMILY MEDICINE | Facility: CLINIC | Age: 71
End: 2021-01-19

## 2021-03-08 ENCOUNTER — PATIENT OUTREACH (OUTPATIENT)
Dept: ADMINISTRATIVE | Facility: OTHER | Age: 71
End: 2021-03-08

## 2021-03-09 ENCOUNTER — HOSPITAL ENCOUNTER (OUTPATIENT)
Dept: RADIOLOGY | Facility: HOSPITAL | Age: 71
Discharge: HOME | End: 2021-03-09
Attending: ORTHOPAEDIC SURGERY
Payer: MEDICARE

## 2021-03-09 ENCOUNTER — OFFICE VISIT (OUTPATIENT)
Dept: SPORTS MEDICINE | Facility: CLINIC | Age: 71
End: 2021-03-09
Payer: MEDICARE

## 2021-03-09 VITALS
HEIGHT: 73 IN | SYSTOLIC BLOOD PRESSURE: 143 MMHG | WEIGHT: 239 LBS | HEART RATE: 59 BPM | BODY MASS INDEX: 31.68 KG/M2 | DIASTOLIC BLOOD PRESSURE: 83 MMHG

## 2021-03-09 DIAGNOSIS — M25.561 PAIN IN BOTH KNEES, UNSPECIFIED CHRONICITY: ICD-10-CM

## 2021-03-09 DIAGNOSIS — M25.562 LEFT KNEE PAIN, UNSPECIFIED CHRONICITY: ICD-10-CM

## 2021-03-09 DIAGNOSIS — M25.562 PAIN IN BOTH KNEES, UNSPECIFIED CHRONICITY: ICD-10-CM

## 2021-03-09 DIAGNOSIS — M25.562 PAIN IN BOTH KNEES, UNSPECIFIED CHRONICITY: Primary | ICD-10-CM

## 2021-03-09 DIAGNOSIS — M25.561 PAIN IN BOTH KNEES, UNSPECIFIED CHRONICITY: Primary | ICD-10-CM

## 2021-03-09 PROCEDURE — 99999 PR PBB SHADOW E&M-EST. PATIENT-LVL IV: CPT | Mod: PBBFAC,,, | Performed by: ORTHOPAEDIC SURGERY

## 2021-03-09 PROCEDURE — 73564 X-RAY EXAM KNEE 4 OR MORE: CPT | Mod: 26,50,, | Performed by: RADIOLOGY

## 2021-03-09 PROCEDURE — 99214 PR OFFICE/OUTPT VISIT, EST, LEVL IV, 30-39 MIN: ICD-10-PCS | Mod: S$PBB,,, | Performed by: ORTHOPAEDIC SURGERY

## 2021-03-09 PROCEDURE — 73564 XR KNEE ORTHO BILAT WITH FLEXION: ICD-10-PCS | Mod: 26,50,, | Performed by: RADIOLOGY

## 2021-03-09 PROCEDURE — 99214 OFFICE O/P EST MOD 30 MIN: CPT | Mod: PBBFAC,25 | Performed by: ORTHOPAEDIC SURGERY

## 2021-03-09 PROCEDURE — 99999 PR PBB SHADOW E&M-EST. PATIENT-LVL IV: ICD-10-PCS | Mod: PBBFAC,,, | Performed by: ORTHOPAEDIC SURGERY

## 2021-03-09 PROCEDURE — 73564 X-RAY EXAM KNEE 4 OR MORE: CPT | Mod: TC,50

## 2021-03-09 PROCEDURE — 99214 OFFICE O/P EST MOD 30 MIN: CPT | Mod: S$PBB,,, | Performed by: ORTHOPAEDIC SURGERY

## 2021-03-09 RX ORDER — DICLOFENAC SODIUM 10 MG/G
2 GEL TOPICAL DAILY
Qty: 1 TUBE | Refills: 0 | Status: SHIPPED | OUTPATIENT
Start: 2021-03-09 | End: 2021-04-08

## 2021-04-07 DIAGNOSIS — M25.562 LEFT KNEE PAIN, UNSPECIFIED CHRONICITY: ICD-10-CM

## 2021-04-07 RX ORDER — DICLOFENAC SODIUM 10 MG/G
2 GEL TOPICAL DAILY
Qty: 1 TUBE | Refills: 0 | Status: CANCELLED | OUTPATIENT
Start: 2021-04-07

## 2021-04-08 ENCOUNTER — PATIENT MESSAGE (OUTPATIENT)
Dept: SPORTS MEDICINE | Facility: CLINIC | Age: 71
End: 2021-04-08

## 2021-04-08 DIAGNOSIS — M25.561 PAIN IN BOTH KNEES, UNSPECIFIED CHRONICITY: Primary | ICD-10-CM

## 2021-04-08 DIAGNOSIS — M25.562 PAIN IN BOTH KNEES, UNSPECIFIED CHRONICITY: Primary | ICD-10-CM

## 2021-04-08 RX ORDER — DICLOFENAC SODIUM 10 MG/G
2 GEL TOPICAL DAILY
Qty: 1 TUBE | Refills: 2 | Status: SHIPPED | OUTPATIENT
Start: 2021-04-08

## 2021-04-12 ENCOUNTER — PATIENT MESSAGE (OUTPATIENT)
Dept: FAMILY MEDICINE | Facility: CLINIC | Age: 71
End: 2021-04-12

## 2021-04-22 ENCOUNTER — PATIENT MESSAGE (OUTPATIENT)
Dept: FAMILY MEDICINE | Facility: CLINIC | Age: 71
End: 2021-04-22

## 2021-07-20 ENCOUNTER — PATIENT MESSAGE (OUTPATIENT)
Dept: ELECTROPHYSIOLOGY | Facility: CLINIC | Age: 71
End: 2021-07-20

## 2021-07-22 ENCOUNTER — PATIENT MESSAGE (OUTPATIENT)
Dept: FAMILY MEDICINE | Facility: CLINIC | Age: 71
End: 2021-07-22

## 2021-07-22 DIAGNOSIS — I10 HYPERTENSION, UNSPECIFIED TYPE: ICD-10-CM

## 2021-07-22 RX ORDER — LOSARTAN POTASSIUM 50 MG/1
50 TABLET ORAL DAILY
Qty: 90 TABLET | Refills: 3 | Status: SHIPPED | OUTPATIENT
Start: 2021-07-22 | End: 2021-07-23 | Stop reason: SDUPTHER

## 2021-07-22 RX ORDER — LOSARTAN POTASSIUM 50 MG/1
50 TABLET ORAL DAILY
Qty: 90 TABLET | Refills: 3 | Status: SHIPPED | OUTPATIENT
Start: 2021-07-22 | End: 2021-07-22

## 2021-07-23 RX ORDER — LOSARTAN POTASSIUM 50 MG/1
50 TABLET ORAL DAILY
Qty: 90 TABLET | Refills: 3 | Status: SHIPPED | OUTPATIENT
Start: 2021-07-23 | End: 2022-07-23

## 2021-08-31 ENCOUNTER — PATIENT MESSAGE (OUTPATIENT)
Dept: FAMILY MEDICINE | Facility: CLINIC | Age: 71
End: 2021-08-31

## 2021-10-28 NOTE — ADDENDUM NOTE
Addended by: LEANNE RAO on: 9/30/2019 02:09 PM     Modules accepted: Orders    
Airway patent, Nasal mucosa clear. Mouth with normal mucosa. Throat has no vesicles, no oropharyngeal exudates and uvula is midline.

## 2022-01-10 ENCOUNTER — PATIENT MESSAGE (OUTPATIENT)
Dept: ADMINISTRATIVE | Facility: HOSPITAL | Age: 72
End: 2022-01-10
Payer: MEDICARE

## 2022-04-12 NOTE — PT/OT/SLP DISCHARGE
Physical Therapy Discharge Summary    Name: Franko Buchanan  MRN: [id number]   Principal Problem: Spondylosis     Patient Discharged from acute Physical Therapy on 18.  Please refer to prior PT noted date on 18 for functional status.     Assessment:     Patient has met all PT goals and would be more appropriate for OP therapy at this time.    Objective:     GOALS:   Multidisciplinary Problems     Physical Therapy Goals        Problem: Physical Therapy Goal    Goal Priority Disciplines Outcome Goal Variances Interventions   Physical Therapy Goal     PT, PT/OT Ongoing (interventions implemented as appropriate)     Description:  Goals to be met by: 18     Patient will increase functional independence with mobility by performin. Gait > 150 feet independently. -- Goal Achieved 18  2. Ascend/descend 2 stairs with no handrails with CGA with or without AD.  -- Goal Achieved 18  3. Don/doff [place] J brace with appropriate assist from significant other and supervision from PT.   -- Goal Achieved 18                       Reasons for Discontinuation of Therapy Services  Transfer to alternate level of care. and Satisfactory goal achievement.      Plan:     Patient Discharged to: Home; PT recommonded OP PT.    Ember Correa, PT  2018   hair removal not indicated

## 2022-05-31 ENCOUNTER — PATIENT MESSAGE (OUTPATIENT)
Dept: ADMINISTRATIVE | Facility: HOSPITAL | Age: 72
End: 2022-05-31
Payer: MEDICARE

## 2023-05-18 ENCOUNTER — APPOINTMENT (RX ONLY)
Dept: URBAN - METROPOLITAN AREA CLINIC 12 | Facility: CLINIC | Age: 73
Setting detail: DERMATOLOGY
End: 2023-05-18

## 2023-05-18 DIAGNOSIS — Z85.828 PERSONAL HISTORY OF OTHER MALIGNANT NEOPLASM OF SKIN: ICD-10-CM

## 2023-05-18 DIAGNOSIS — D22 MELANOCYTIC NEVI: ICD-10-CM

## 2023-05-18 DIAGNOSIS — D49.2 NEOPLASM OF UNSPECIFIED BEHAVIOR OF BONE, SOFT TISSUE, AND SKIN: ICD-10-CM

## 2023-05-18 DIAGNOSIS — L82.1 OTHER SEBORRHEIC KERATOSIS: ICD-10-CM

## 2023-05-18 DIAGNOSIS — Z71.89 OTHER SPECIFIED COUNSELING: ICD-10-CM

## 2023-05-18 DIAGNOSIS — L81.4 OTHER MELANIN HYPERPIGMENTATION: ICD-10-CM

## 2023-05-18 DIAGNOSIS — D18.0 HEMANGIOMA: ICD-10-CM

## 2023-05-18 PROBLEM — D22.5 MELANOCYTIC NEVI OF TRUNK: Status: ACTIVE | Noted: 2023-05-18

## 2023-05-18 PROBLEM — D18.01 HEMANGIOMA OF SKIN AND SUBCUTANEOUS TISSUE: Status: ACTIVE | Noted: 2023-05-18

## 2023-05-18 PROCEDURE — ? COUNSELING

## 2023-05-18 PROCEDURE — 99203 OFFICE O/P NEW LOW 30 MIN: CPT | Mod: 25

## 2023-05-18 PROCEDURE — 11102 TANGNTL BX SKIN SINGLE LES: CPT

## 2023-05-18 PROCEDURE — ? BIOPSY BY SHAVE METHOD

## 2023-05-18 PROCEDURE — ? PHOTO-DOCUMENTATION

## 2023-05-18 ASSESSMENT — LOCATION DETAILED DESCRIPTION DERM
LOCATION DETAILED: RIGHT SUPERIOR UPPER BACK
LOCATION DETAILED: RIGHT CENTRAL TEMPLE
LOCATION DETAILED: RIGHT CENTRAL MALAR CHEEK
LOCATION DETAILED: LEFT CENTRAL MALAR CHEEK
LOCATION DETAILED: RIGHT MID-UPPER BACK
LOCATION DETAILED: LEFT MEDIAL INFERIOR CHEST
LOCATION DETAILED: LEFT SUPERIOR UPPER BACK

## 2023-05-18 ASSESSMENT — LOCATION SIMPLE DESCRIPTION DERM
LOCATION SIMPLE: RIGHT CHEEK
LOCATION SIMPLE: LEFT CHEEK
LOCATION SIMPLE: RIGHT UPPER BACK
LOCATION SIMPLE: LEFT UPPER BACK
LOCATION SIMPLE: RIGHT TEMPLE
LOCATION SIMPLE: CHEST

## 2023-05-18 ASSESSMENT — LOCATION ZONE DERM
LOCATION ZONE: FACE
LOCATION ZONE: TRUNK

## 2023-05-18 NOTE — PROCEDURE: MIPS QUALITY
Detail Level: Detailed
Quality 47: Advance Care Plan: Advance Care Planning discussed and documented; advance care plan or surrogate decision maker documented in the medical record.
Quality 431: Preventive Care And Screening: Unhealthy Alcohol Use - Screening: Patient not identified as an unhealthy alcohol user when screened for unhealthy alcohol use using a systematic screening method
Quality 110: Preventive Care And Screening: Influenza Immunization: Influenza Immunization previously received during influenza season
Quality 111:Pneumonia Vaccination Status For Older Adults: Patient received any pneumococcal conjugate or polysaccharide vaccine on or after their 60th birthday and before the end of the measurement period
Quality 130: Documentation Of Current Medications In The Medical Record: Current Medications Documented
Quality 226: Preventive Care And Screening: Tobacco Use: Screening And Cessation Intervention: Patient screened for tobacco use and is an ex/non-smoker

## 2023-05-18 NOTE — PROCEDURE: BIOPSY BY SHAVE METHOD
Detail Level: Detailed
Depth Of Biopsy: dermis
Was A Bandage Applied: Yes
Size Of Lesion In Cm: 1
X Size Of Lesion In Cm: 0
Biopsy Type: H and E
Biopsy Method: Dermablade
Anesthesia Type: 1% lidocaine without epinephrine
Anesthesia Volume In Cc (Will Not Render If 0): 0.5
Hemostasis: Drysol
Wound Care: Petrolatum
Dressing: bandage
Destruction After The Procedure: No
Type Of Destruction Used: Curettage
Curettage Text: The wound bed was treated with curettage after the biopsy was performed.
Cryotherapy Text: The wound bed was treated with cryotherapy after the biopsy was performed.
Electrodesiccation Text: The wound bed was treated with electrodesiccation after the biopsy was performed.
Electrodesiccation And Curettage Text: The wound bed was treated with electrodesiccation and curettage after the biopsy was performed.
Silver Nitrate Text: The wound bed was treated with silver nitrate after the biopsy was performed.
Lab: -[id number]
Triangulation (Location Of Lesion In Relation To Distance From Anatomical Landmarks): 1 cm
Consent: Verbal consent was obtained and risks were reviewed including but not limited to scarring, infection, bleeding, scabbing, incomplete removal, nerve damage and allergy to anesthesia.
Post-care instructions were reviewed with the patient. Patient is to keep the biopsy site dry overnight. Tomorrow the patient may remove the bandage, wash with soap and water, and then apply Vaseline. Repeat daily until the site is fully healed.
Notification Instructions: Patient will be notified of biopsy results. However, patient instructed to call the office if not contacted within 2 weeks.
Billing Type: Third-Party Bill
Information: Selecting Yes will display possible errors in your note based on the variables you have selected. This validation is only offered as a suggestion for you. PLEASE NOTE THAT THE VALIDATION TEXT WILL BE REMOVED WHEN YOU FINALIZE YOUR NOTE. IF YOU WANT TO FAX A PRELIMINARY NOTE YOU WILL NEED TO TOGGLE THIS TO 'NO' IF YOU DO NOT WANT IT IN YOUR FAXED NOTE.

## 2023-05-18 NOTE — HPI: NON-MELANOMA SKIN CANCER F/U (HISTORY OF NMSC)
What Is The Reason For Today's Visit?: History of Non-Melanoma Skin Cancer
How Many Skin Cancers Have You Had?: more than one
Additional History: Pt presents for FBSE. He reports his last skin check was in 2021.
When Was Your Last Cancer Diagnosed?: 2021

## 2023-07-27 ENCOUNTER — APPOINTMENT (RX ONLY)
Dept: URBAN - METROPOLITAN AREA CLINIC 12 | Facility: CLINIC | Age: 73
Setting detail: DERMATOLOGY
End: 2023-07-27

## 2023-07-27 DIAGNOSIS — L72.8 OTHER FOLLICULAR CYSTS OF THE SKIN AND SUBCUTANEOUS TISSUE: ICD-10-CM

## 2023-07-27 PROBLEM — C44.519 BASAL CELL CARCINOMA OF SKIN OF OTHER PART OF TRUNK: Status: ACTIVE | Noted: 2023-07-27

## 2023-07-27 PROCEDURE — ? DEFER

## 2023-07-27 PROCEDURE — 17262 DSTRJ MAL LES T/A/L 1.1-2.0: CPT

## 2023-07-27 PROCEDURE — ? PHOTO-DOCUMENTATION

## 2023-07-27 PROCEDURE — ? COUNSELING

## 2023-07-27 PROCEDURE — ? CURETTAGE AND DESTRUCTION

## 2023-07-27 PROCEDURE — 99212 OFFICE O/P EST SF 10 MIN: CPT | Mod: 25

## 2023-07-27 ASSESSMENT — LOCATION DETAILED DESCRIPTION DERM: LOCATION DETAILED: INFERIOR THORACIC SPINE

## 2023-07-27 ASSESSMENT — LOCATION SIMPLE DESCRIPTION DERM: LOCATION SIMPLE: UPPER BACK

## 2023-07-27 ASSESSMENT — LOCATION ZONE DERM: LOCATION ZONE: TRUNK

## 2023-07-27 NOTE — PROCEDURE: DEFER
Detail Level: Detailed
Instructions (Optional): Discussed that lesion is benign and does not require removal if asymptomatic. If lesion becomes symptomatic or is enlarging, he can call our office to schedule for an excision.
Introduction Text (Please End With A Colon): The following procedure was deferred:
Size Of Lesion In Cm (Optional): 0.8
Procedure To Be Performed At Next Visit: Excision
X Size Of Lesion In Cm (Optional): 0

## 2023-07-27 NOTE — HPI: CYST
How Severe Is Your Cyst?: mild
Is This A New Presentation, Or A Follow-Up?: Cyst
Additional History: Patient just wants to make sure lesion is ok and see if he needs treatment; his wife is concerned about it

## 2024-01-11 ENCOUNTER — TELEPHONE (OUTPATIENT)
Dept: FAMILY MEDICINE | Facility: CLINIC | Age: 74
End: 2024-01-11
Payer: MEDICARE

## 2024-01-11 NOTE — TELEPHONE ENCOUNTER
----- Message from Stefani Napier sent at 1/11/2024  1:50 PM CST -----  Contact: Ana Paula  Type:  Call back     Who Called:Ana Paula with [facility]     Does the patient know what this is regarding?:CPAP Supplies   Would the patient rather a call back or a response via [facility]? Call   Best Call Back Number: fax#  Ref# [id number]  Additional Information:      Ana Paula is requesting a call back with a update on the prescription for CPAP supplies for pt

## 2024-01-31 ENCOUNTER — APPOINTMENT (RX ONLY)
Dept: URBAN - METROPOLITAN AREA CLINIC 12 | Facility: CLINIC | Age: 74
Setting detail: DERMATOLOGY
End: 2024-01-31

## 2024-01-31 DIAGNOSIS — Z85.828 PERSONAL HISTORY OF OTHER MALIGNANT NEOPLASM OF SKIN: ICD-10-CM | Status: STABLE

## 2024-01-31 DIAGNOSIS — L82.1 OTHER SEBORRHEIC KERATOSIS: ICD-10-CM

## 2024-01-31 DIAGNOSIS — Z71.89 OTHER SPECIFIED COUNSELING: ICD-10-CM

## 2024-01-31 DIAGNOSIS — D22 MELANOCYTIC NEVI: ICD-10-CM

## 2024-01-31 DIAGNOSIS — L81.4 OTHER MELANIN HYPERPIGMENTATION: ICD-10-CM

## 2024-01-31 DIAGNOSIS — L85.8 OTHER SPECIFIED EPIDERMAL THICKENING: ICD-10-CM

## 2024-01-31 DIAGNOSIS — L57.0 ACTINIC KERATOSIS: ICD-10-CM | Status: INADEQUATELY CONTROLLED

## 2024-01-31 DIAGNOSIS — D18.0 HEMANGIOMA: ICD-10-CM

## 2024-01-31 PROBLEM — D18.01 HEMANGIOMA OF SKIN AND SUBCUTANEOUS TISSUE: Status: ACTIVE | Noted: 2024-01-31

## 2024-01-31 PROBLEM — D22.5 MELANOCYTIC NEVI OF TRUNK: Status: ACTIVE | Noted: 2024-01-31

## 2024-01-31 PROCEDURE — ? DIAGNOSIS COMMENT

## 2024-01-31 PROCEDURE — 99213 OFFICE O/P EST LOW 20 MIN: CPT | Mod: 25

## 2024-01-31 PROCEDURE — ? LIQUID NITROGEN

## 2024-01-31 PROCEDURE — ? TREATMENT REGIMEN

## 2024-01-31 PROCEDURE — 17000 DESTRUCT PREMALG LESION: CPT

## 2024-01-31 PROCEDURE — ? COUNSELING

## 2024-01-31 PROCEDURE — 17003 DESTRUCT PREMALG LES 2-14: CPT

## 2024-01-31 ASSESSMENT — LOCATION SIMPLE DESCRIPTION DERM
LOCATION SIMPLE: LEFT UPPER BACK
LOCATION SIMPLE: RIGHT ZYGOMA
LOCATION SIMPLE: CHEST
LOCATION SIMPLE: RIGHT UPPER BACK
LOCATION SIMPLE: RIGHT HEEL
LOCATION SIMPLE: LEFT TEMPLE
LOCATION SIMPLE: RIGHT CHEEK
LOCATION SIMPLE: LEFT HEEL
LOCATION SIMPLE: RIGHT TEMPLE
LOCATION SIMPLE: LEFT CHEEK

## 2024-01-31 ASSESSMENT — LOCATION DETAILED DESCRIPTION DERM
LOCATION DETAILED: LEFT SUPERIOR UPPER BACK
LOCATION DETAILED: LEFT HEEL
LOCATION DETAILED: RIGHT INFERIOR CENTRAL MALAR CHEEK
LOCATION DETAILED: LEFT MEDIAL SUPERIOR CHEST
LOCATION DETAILED: RIGHT HEEL
LOCATION DETAILED: LEFT INFERIOR CENTRAL MALAR CHEEK
LOCATION DETAILED: LEFT SUPERIOR CENTRAL MALAR CHEEK
LOCATION DETAILED: LEFT CENTRAL TEMPLE
LOCATION DETAILED: LEFT MEDIAL MALAR CHEEK
LOCATION DETAILED: RIGHT SUPERIOR UPPER BACK
LOCATION DETAILED: LEFT CENTRAL MALAR CHEEK
LOCATION DETAILED: RIGHT MID-UPPER BACK
LOCATION DETAILED: RIGHT CENTRAL TEMPLE
LOCATION DETAILED: RIGHT MEDIAL ZYGOMA

## 2024-01-31 ASSESSMENT — LOCATION ZONE DERM
LOCATION ZONE: TRUNK
LOCATION ZONE: FACE
LOCATION ZONE: FEET

## 2024-01-31 ASSESSMENT — PAIN INTENSITY VAS: HOW INTENSE IS YOUR PAIN 0 BEING NO PAIN, 10 BEING THE MOST SEVERE PAIN POSSIBLE?: NO PAIN

## 2024-01-31 NOTE — PROCEDURE: LIQUID NITROGEN
Render Note In Bullet Format When Appropriate: Yes
Post-Care Instructions: I reviewed with the patient in detail post-care instructions. Patient advised to use sun protection and avoid picking of any of the treated lesions. Discussed that blistering or scabbing may occur and patient may apply Vaseline to affected areas.
Application Tool (Optional): Cry-AC
Duration Of Freeze Thaw-Cycle (Seconds): 10
Consent: The patient's verbal consent was obtained including but not limited to risks of crusting, scabbing, blistering, scarring, darker or lighter pigmentary change, recurrence, incomplete removal, and infection.
Show Applicator Variable?: No
Detail Level: Simple
Number Of Freeze-Thaw Cycles: 1 freeze-thaw cycle

## 2024-05-23 ENCOUNTER — APPOINTMENT (RX ONLY)
Dept: URBAN - METROPOLITAN AREA CLINIC 12 | Facility: CLINIC | Age: 74
Setting detail: DERMATOLOGY
End: 2024-05-23

## 2024-05-23 DIAGNOSIS — Z85.828 PERSONAL HISTORY OF OTHER MALIGNANT NEOPLASM OF SKIN: ICD-10-CM

## 2024-05-23 DIAGNOSIS — L85.8 OTHER SPECIFIED EPIDERMAL THICKENING: ICD-10-CM

## 2024-05-23 DIAGNOSIS — L82.1 OTHER SEBORRHEIC KERATOSIS: ICD-10-CM

## 2024-05-23 DIAGNOSIS — Z71.89 OTHER SPECIFIED COUNSELING: ICD-10-CM

## 2024-05-23 DIAGNOSIS — L81.4 OTHER MELANIN HYPERPIGMENTATION: ICD-10-CM

## 2024-05-23 DIAGNOSIS — D22 MELANOCYTIC NEVI: ICD-10-CM

## 2024-05-23 DIAGNOSIS — D18.0 HEMANGIOMA: ICD-10-CM

## 2024-05-23 PROBLEM — D22.5 MELANOCYTIC NEVI OF TRUNK: Status: ACTIVE | Noted: 2024-05-23

## 2024-05-23 PROBLEM — D18.01 HEMANGIOMA OF SKIN AND SUBCUTANEOUS TISSUE: Status: ACTIVE | Noted: 2024-05-23

## 2024-05-23 PROBLEM — C44.519 BASAL CELL CARCINOMA OF SKIN OF OTHER PART OF TRUNK: Status: ACTIVE | Noted: 2024-05-23

## 2024-05-23 PROCEDURE — 99213 OFFICE O/P EST LOW 20 MIN: CPT

## 2024-05-23 PROCEDURE — ? DIAGNOSIS COMMENT

## 2024-05-23 PROCEDURE — ? COUNSELING

## 2024-05-23 PROCEDURE — ? TREATMENT REGIMEN

## 2024-05-23 ASSESSMENT — LOCATION SIMPLE DESCRIPTION DERM
LOCATION SIMPLE: LEFT CHEEK
LOCATION SIMPLE: RIGHT HEEL
LOCATION SIMPLE: LEFT HEEL
LOCATION SIMPLE: RIGHT CHEEK
LOCATION SIMPLE: RIGHT TEMPLE
LOCATION SIMPLE: LEFT UPPER BACK
LOCATION SIMPLE: RIGHT UPPER BACK

## 2024-05-23 ASSESSMENT — LOCATION ZONE DERM
LOCATION ZONE: FACE
LOCATION ZONE: TRUNK
LOCATION ZONE: FEET

## 2024-05-23 ASSESSMENT — LOCATION DETAILED DESCRIPTION DERM
LOCATION DETAILED: LEFT SUPERIOR UPPER BACK
LOCATION DETAILED: LEFT HEEL
LOCATION DETAILED: RIGHT INFERIOR CENTRAL MALAR CHEEK
LOCATION DETAILED: RIGHT SUPERIOR UPPER BACK
LOCATION DETAILED: RIGHT CENTRAL TEMPLE
LOCATION DETAILED: RIGHT MID-UPPER BACK
LOCATION DETAILED: RIGHT HEEL
LOCATION DETAILED: LEFT INFERIOR CENTRAL MALAR CHEEK
LOCATION DETAILED: RIGHT MID TEMPLE

## 2024-05-23 NOTE — PROCEDURE: DIAGNOSIS COMMENT
Detail Level: Zone
Comment: Most recent: L upper back ED&C 7/2023\\nPrevious R temple and R&L cheeks per pt
Render Risk Assessment In Note?: no

## 2024-05-23 NOTE — PROCEDURE: TREATMENT REGIMEN
Detail Level: Simple
Continue Regimen: Pt using wife's tretinoin; Continue Tretinoin
Continue Regimen: Urea cream PRN
Plan: Resolved with ED&C

## 2024-08-09 ENCOUNTER — PATIENT MESSAGE (OUTPATIENT)
Dept: ADMINISTRATIVE | Facility: OTHER | Age: 74
End: 2024-08-09
Payer: MEDICARE

## 2024-08-14 ENCOUNTER — APPOINTMENT (RX ONLY)
Dept: URBAN - METROPOLITAN AREA CLINIC 12 | Facility: CLINIC | Age: 74
Setting detail: DERMATOLOGY
End: 2024-08-14

## 2024-08-14 DIAGNOSIS — L29.8 OTHER PRURITUS: ICD-10-CM

## 2024-08-14 DIAGNOSIS — B35.1 TINEA UNGUIUM: ICD-10-CM

## 2024-08-14 DIAGNOSIS — L82.1 OTHER SEBORRHEIC KERATOSIS: ICD-10-CM

## 2024-08-14 DIAGNOSIS — D69.2 OTHER NONTHROMBOCYTOPENIC PURPURA: ICD-10-CM

## 2024-08-14 DIAGNOSIS — L81.4 OTHER MELANIN HYPERPIGMENTATION: ICD-10-CM

## 2024-08-14 DIAGNOSIS — D18.0 HEMANGIOMA: ICD-10-CM

## 2024-08-14 DIAGNOSIS — Z71.89 OTHER SPECIFIED COUNSELING: ICD-10-CM

## 2024-08-14 DIAGNOSIS — Z85.828 PERSONAL HISTORY OF OTHER MALIGNANT NEOPLASM OF SKIN: ICD-10-CM

## 2024-08-14 DIAGNOSIS — D22 MELANOCYTIC NEVI: ICD-10-CM

## 2024-08-14 DIAGNOSIS — L29.89 OTHER PRURITUS: ICD-10-CM

## 2024-08-14 PROBLEM — D22.5 MELANOCYTIC NEVI OF TRUNK: Status: ACTIVE | Noted: 2024-08-14

## 2024-08-14 PROBLEM — D18.01 HEMANGIOMA OF SKIN AND SUBCUTANEOUS TISSUE: Status: ACTIVE | Noted: 2024-08-14

## 2024-08-14 PROCEDURE — ? OTC TREATMENT REGIMEN

## 2024-08-14 PROCEDURE — 99213 OFFICE O/P EST LOW 20 MIN: CPT

## 2024-08-14 PROCEDURE — ? COUNSELING

## 2024-08-14 PROCEDURE — ? DIAGNOSIS COMMENT

## 2024-08-14 PROCEDURE — ? TREATMENT REGIMEN

## 2024-08-14 ASSESSMENT — LOCATION DETAILED DESCRIPTION DERM
LOCATION DETAILED: RIGHT CENTRAL TEMPLE
LOCATION DETAILED: RIGHT SUPERIOR UPPER BACK
LOCATION DETAILED: LEFT 2ND TOENAIL
LOCATION DETAILED: LEFT INFERIOR CENTRAL MALAR CHEEK
LOCATION DETAILED: RIGHT INFERIOR CENTRAL MALAR CHEEK
LOCATION DETAILED: LEFT PROXIMAL DORSAL FOREARM
LOCATION DETAILED: RIGHT GREAT TOENAIL
LOCATION DETAILED: RIGHT PROXIMAL DORSAL FOREARM
LOCATION DETAILED: LEFT SUPERIOR UPPER BACK
LOCATION DETAILED: RIGHT MID-UPPER BACK
LOCATION DETAILED: RIGHT 2ND TOENAIL
LOCATION DETAILED: LEFT GREAT TOENAIL

## 2024-08-14 ASSESSMENT — LOCATION SIMPLE DESCRIPTION DERM
LOCATION SIMPLE: RIGHT CHEEK
LOCATION SIMPLE: RIGHT 2ND TOE
LOCATION SIMPLE: LEFT GREAT TOE
LOCATION SIMPLE: RIGHT UPPER BACK
LOCATION SIMPLE: RIGHT TEMPLE
LOCATION SIMPLE: LEFT UPPER BACK
LOCATION SIMPLE: RIGHT GREAT TOE
LOCATION SIMPLE: RIGHT FOREARM
LOCATION SIMPLE: LEFT 2ND TOE
LOCATION SIMPLE: LEFT FOREARM
LOCATION SIMPLE: LEFT CHEEK

## 2024-08-14 ASSESSMENT — LOCATION ZONE DERM
LOCATION ZONE: FACE
LOCATION ZONE: TOENAIL
LOCATION ZONE: ARM
LOCATION ZONE: TRUNK

## 2024-08-14 ASSESSMENT — SEVERITY ASSESSMENT: SEVERITY: MILD

## 2024-08-14 NOTE — PROCEDURE: OTC TREATMENT REGIMEN
Detail Level: Zone
Patient Specific Otc Recommendations (Will Not Stick From Patient To Patient): Neuropathic nature of pruritus discussed. Sarna lotion and frequent moisturizer use recommended. If not improving, discussed trying gabapentin in the future

## 2024-08-14 NOTE — HPI: EVALUATION OF SKIN LESION(S)
What Type Of Note Output Would You Prefer (Optional)?: Standard Output
How Severe Are Your Spot(S)?: mild
Hpi Title: Evaluation of Skin Lesions
Additional History: Pt notes significant itching on the arms without rash

## 2024-08-14 NOTE — PROCEDURE: DIAGNOSIS COMMENT
Detail Level: Zone
Comment: Most recent: L upper back ED&C 7/2023
Render Risk Assessment In Note?: no

## 2024-08-14 NOTE — PROCEDURE: TREATMENT REGIMEN
Plan: Treatment options including topical and oral antifungals discussed including relative risks (including hepatotoxicity) and efficacy rates. Pt defers treatment at this time.
Detail Level: Simple
Plan: Arnica gel

## (undated) DEVICE — CORD BIPOLAR 12 FOOT

## (undated) DEVICE — DRAPE ABDOMINAL TIBURON 14X11

## (undated) DEVICE — PIN DISTRACTOR 14MM
Type: IMPLANTABLE DEVICE | Site: SPINE CERVICAL | Status: NON-FUNCTIONAL
Removed: 2018-11-28

## (undated) DEVICE — DRESSING ABSRBNT ISLAND 3.6X8

## (undated) DEVICE — SYS RETRACTOR SPINAL 3V MARS

## (undated) DEVICE — EVACUATOR WOUND BULB 100CC

## (undated) DEVICE — CARTRIDGE OIL

## (undated) DEVICE — DRAPE INCISE IOBAN 2 23X17IN

## (undated) DEVICE — PACK SET UP CONVERTORS

## (undated) DEVICE — DRAPE STERI INSTRUMENT 1018

## (undated) DEVICE — HANDLE EZ 3641

## (undated) DEVICE — KIT PEDICLE ACCESS

## (undated) DEVICE — STAPLER SKIN PROXIMATE WIDE

## (undated) DEVICE — SEE MEDLINE ITEM 146292

## (undated) DEVICE — SPONGE LAP 18X18 PREWASHED

## (undated) DEVICE — GAUZE SPONGE PEANUT STRL

## (undated) DEVICE — Device

## (undated) DEVICE — DRAPE STERI-DRAPE 1000 17X11IN

## (undated) DEVICE — NDL SPINAL 18GX3.5 SPINOCAN

## (undated) DEVICE — COVER SNAP 36IN X 30IN

## (undated) DEVICE — SEE MEDLINE ITEM 156905

## (undated) DEVICE — SPONGE KITTNER 1/4X 5/8 L STRL

## (undated) DEVICE — SEE MEDLINE ITEM 152622

## (undated) DEVICE — COVER LIGHT HANDLE 80/CA

## (undated) DEVICE — NDL JAMSHIDI 10GA

## (undated) DEVICE — DRESSING MEPILEX BORDER 4 X 4

## (undated) DEVICE — GLOVE BIOGEL SKINSENSE PI 7.0

## (undated) DEVICE — DIFFUSER

## (undated) DEVICE — CLOSURE SKIN STERI STRIP 1/2X4

## (undated) DEVICE — FORCEP BPLR BAYONETTED STR

## (undated) DEVICE — SEE MEDLINE ITEM 146313

## (undated) DEVICE — DRESSING AQUACEL SACRAL 9 X 9

## (undated) DEVICE — MARKER SKIN STND TIP BLUE BARR

## (undated) DEVICE — SEE MEDLINE ITEM 157150

## (undated) DEVICE — ELECTRODE BLADE INSULATED 1 IN

## (undated) DEVICE — SEE MEDLINE ITEM 154981

## (undated) DEVICE — SUT MCRYL PLUS 4-0 PS2 27IN

## (undated) DEVICE — ELECTRODE REM PLYHSV RETURN 9

## (undated) DEVICE — DRESSING TRANS 4X4 TEGADERM

## (undated) DEVICE — BUR BONE CUT MICRO TPS 3X3.8MM

## (undated) DEVICE — DRESSING AQUACEL FOAM 5 X 5

## (undated) DEVICE — BLADE ELECTRO EDGE INSULATED

## (undated) DEVICE — TUBE FRAZIER 5MM 2FT SOFT TIP

## (undated) DEVICE — SYS ILLUMINATION MARS3V SPINE

## (undated) DEVICE — SUT VICRYL PLUS 2-0 CT1 18

## (undated) DEVICE — WIRE K BLUNT TIP 1.5X500MM
Type: IMPLANTABLE DEVICE | Site: SPINE LUMBAR | Status: NON-FUNCTIONAL
Removed: 2019-08-07

## (undated) DEVICE — KIT SPINAL PATIENT CARE JACK

## (undated) DEVICE — DRAPE MICROSCOPE 4 OCLR 54X150

## (undated) DEVICE — GLOVE BIOGEL ECLIPSE SZ 7

## (undated) DEVICE — DRESSING SURGICAL 1/2X1/2

## (undated) DEVICE — BONE MARROW ASPIRATION NEEDLE

## (undated) DEVICE — BAG SNAP KOVER 40X30

## (undated) DEVICE — SUT VICRYL PLUS 3-0 SH 18IN

## (undated) DEVICE — TRAY FOLEY 16FR INFECTION CONT

## (undated) DEVICE — SHIM DISC STAINLESS STEEL 3VL
Type: IMPLANTABLE DEVICE | Site: SPINE LUMBAR | Status: NON-FUNCTIONAL
Removed: 2019-08-07

## (undated) DEVICE — SEE MEDLINE ITEM 157131

## (undated) DEVICE — SUT VICRYL PLUS 0 CT1 18IN

## (undated) DEVICE — DRAIN CHANNEL ROUND 10FR

## (undated) DEVICE — GLOVE BIOGEL ORTHOPEDIC 8

## (undated) DEVICE — PROBE INSULATED 8IN STRL

## (undated) DEVICE — UNDERGLOVES BIOGEL PI SZ 7 LF

## (undated) DEVICE — DRAPE THYROID WITH ARMBOARD

## (undated) DEVICE — DRAPE C-ARMOR EQUIPMENT COVER

## (undated) DEVICE — DRESSING TELFA N ADH 3X8

## (undated) DEVICE — NDL HYPO REG 25G X 1 1/2

## (undated) DEVICE — GAUZE SPONGE 4X4 12PLY